# Patient Record
Sex: MALE | Race: WHITE | NOT HISPANIC OR LATINO | Employment: FULL TIME | URBAN - METROPOLITAN AREA
[De-identification: names, ages, dates, MRNs, and addresses within clinical notes are randomized per-mention and may not be internally consistent; named-entity substitution may affect disease eponyms.]

---

## 2017-08-31 ENCOUNTER — PATIENT OUTREACH (OUTPATIENT)
Dept: ADMINISTRATIVE | Facility: HOSPITAL | Age: 62
End: 2017-08-31

## 2017-08-31 NOTE — PROGRESS NOTES
Call pt concerning a physical with Dr. Ulisses Bingham.  Pt stated he do not care to see Dr. Bingham anymore and request to be removed as his PCP. Care team updated.

## 2018-08-28 DIAGNOSIS — M25.569 KNEE PAIN, UNSPECIFIED CHRONICITY, UNSPECIFIED LATERALITY: Primary | ICD-10-CM

## 2018-08-29 ENCOUNTER — OFFICE VISIT (OUTPATIENT)
Dept: SPINE | Facility: CLINIC | Age: 63
End: 2018-08-29
Payer: COMMERCIAL

## 2018-08-29 ENCOUNTER — OFFICE VISIT (OUTPATIENT)
Dept: ORTHOPEDICS | Facility: CLINIC | Age: 63
End: 2018-08-29
Payer: COMMERCIAL

## 2018-08-29 ENCOUNTER — HOSPITAL ENCOUNTER (OUTPATIENT)
Dept: RADIOLOGY | Facility: HOSPITAL | Age: 63
Discharge: HOME | End: 2018-08-29
Attending: PHYSICIAN ASSISTANT
Payer: COMMERCIAL

## 2018-08-29 ENCOUNTER — HOSPITAL ENCOUNTER (OUTPATIENT)
Dept: RADIOLOGY | Facility: HOSPITAL | Age: 63
Discharge: HOME | End: 2018-08-29
Attending: ORTHOPAEDIC SURGERY
Payer: COMMERCIAL

## 2018-08-29 VITALS
SYSTOLIC BLOOD PRESSURE: 135 MMHG | BODY MASS INDEX: 25.04 KG/M2 | HEART RATE: 66 BPM | DIASTOLIC BLOOD PRESSURE: 92 MMHG | WEIGHT: 169.06 LBS | HEIGHT: 69 IN

## 2018-08-29 VITALS — HEIGHT: 69 IN | BODY MASS INDEX: 25.03 KG/M2 | WEIGHT: 169 LBS

## 2018-08-29 DIAGNOSIS — M54.2 CERVICALGIA: Primary | ICD-10-CM

## 2018-08-29 DIAGNOSIS — M17.0 PRIMARY OSTEOARTHRITIS OF BOTH KNEES: Primary | ICD-10-CM

## 2018-08-29 DIAGNOSIS — Z98.890 HISTORY OF CERVICAL SPINAL SURGERY: ICD-10-CM

## 2018-08-29 DIAGNOSIS — M25.562 CHRONIC PAIN OF BOTH KNEES: ICD-10-CM

## 2018-08-29 DIAGNOSIS — G89.29 CHRONIC PAIN OF BOTH KNEES: ICD-10-CM

## 2018-08-29 DIAGNOSIS — M25.561 CHRONIC PAIN OF BOTH KNEES: ICD-10-CM

## 2018-08-29 DIAGNOSIS — M25.569 KNEE PAIN, UNSPECIFIED CHRONICITY, UNSPECIFIED LATERALITY: ICD-10-CM

## 2018-08-29 DIAGNOSIS — M54.2 CERVICALGIA: ICD-10-CM

## 2018-08-29 PROCEDURE — 99999 PR PBB SHADOW E&M-EST. PATIENT-LVL II: CPT | Mod: PBBFAC,,, | Performed by: ORTHOPAEDIC SURGERY

## 2018-08-29 PROCEDURE — 99999 PR PBB SHADOW E&M-EST. PATIENT-LVL III: CPT | Mod: PBBFAC,,, | Performed by: PHYSICIAN ASSISTANT

## 2018-08-29 PROCEDURE — 99204 OFFICE O/P NEW MOD 45 MIN: CPT | Mod: 25,S$GLB,, | Performed by: ORTHOPAEDIC SURGERY

## 2018-08-29 PROCEDURE — 72052 X-RAY EXAM NECK SPINE 6/>VWS: CPT | Mod: TC,PO

## 2018-08-29 PROCEDURE — 73562 X-RAY EXAM OF KNEE 3: CPT | Mod: 26,LT,, | Performed by: RADIOLOGY

## 2018-08-29 PROCEDURE — 73562 X-RAY EXAM OF KNEE 3: CPT | Mod: 26,RT,, | Performed by: RADIOLOGY

## 2018-08-29 PROCEDURE — 20610 DRAIN/INJ JOINT/BURSA W/O US: CPT | Mod: 50,S$GLB,, | Performed by: ORTHOPAEDIC SURGERY

## 2018-08-29 PROCEDURE — 72052 X-RAY EXAM NECK SPINE 6/>VWS: CPT | Mod: 26,,, | Performed by: RADIOLOGY

## 2018-08-29 PROCEDURE — 73562 X-RAY EXAM OF KNEE 3: CPT | Mod: TC,50,PO

## 2018-08-29 PROCEDURE — 99203 OFFICE O/P NEW LOW 30 MIN: CPT | Mod: S$GLB,,, | Performed by: PHYSICIAN ASSISTANT

## 2018-08-29 RX ORDER — TRIAMCINOLONE ACETONIDE 40 MG/ML
80 INJECTION, SUSPENSION INTRA-ARTICULAR; INTRAMUSCULAR
Status: DISCONTINUED | OUTPATIENT
Start: 2018-08-29 | End: 2018-08-29 | Stop reason: HOSPADM

## 2018-08-29 RX ADMIN — TRIAMCINOLONE ACETONIDE 80 MG: 40 INJECTION, SUSPENSION INTRA-ARTICULAR; INTRAMUSCULAR at 11:08

## 2018-08-29 NOTE — PROGRESS NOTES
Neurosurgery History & Physical    Patient ID: Edy Bartlett III is a 63 y.o. male.    Chief Complaint   Patient presents with    Neck Pain     Has had neck pain for years. Had neck fusion x2. Pain is constant. Taking Ibuprofen 800mg helps pain . Movement of head makes pain worse.       Review of Systems   Constitutional: Negative for activity change, chills, fatigue and unexpected weight change.   HENT: Negative for hearing loss, tinnitus, trouble swallowing and voice change.    Eyes: Negative for visual disturbance.   Respiratory: Negative for apnea, chest tightness and shortness of breath.    Cardiovascular: Negative for chest pain and palpitations.   Gastrointestinal: Negative for abdominal pain, constipation, diarrhea, nausea and vomiting.   Genitourinary: Negative for difficulty urinating, dysuria and frequency.   Musculoskeletal: Positive for neck pain. Negative for back pain, gait problem and neck stiffness.   Skin: Negative for wound.   Neurological: Negative for dizziness, tremors, seizures, facial asymmetry, speech difficulty, weakness, light-headedness, numbness and headaches.   Psychiatric/Behavioral: Negative for confusion and decreased concentration.       Past Medical History:   Diagnosis Date    BPH (benign prostatic hypertrophy) with urinary obstruction     Complication of anesthesia     gets very sore neck post-op if his head is twisted or bent too far with intubation    DDD (degenerative disc disease), cervical     DDD (degenerative disc disease), lumbar     DJD (degenerative joint disease) of knee 12/10/2012    ED (erectile dysfunction)     Elevated PSA     Hyperlipidemia     Hypertension     Mass of skin 2012    left temple, possible lipoma    Neuropathy     feet    OA (osteoarthritis of spine)     Tattoo Dec 2012    left upper arm     Social History     Socioeconomic History    Marital status:      Spouse name: Not on file    Number of children: Not on file    Years of  "education: Not on file    Highest education level: Not on file   Social Needs    Financial resource strain: Not on file    Food insecurity - worry: Not on file    Food insecurity - inability: Not on file    Transportation needs - medical: Not on file    Transportation needs - non-medical: Not on file   Occupational History     Employer:  [employer]   Tobacco Use    Smoking status: Never Smoker    Smokeless tobacco: Never Used   Substance and Sexual Activity    Alcohol use: No    Drug use: No    Sexual activity: Not on file   Other Topics Concern    Not on file   Social History Narrative    Not on file     Family History   Problem Relation Age of Onset    Hyperlipidemia Mother     Heart disease Mother     Diabetes Son     Diabetes type II Maternal Aunt     Prostate cancer Brother      Review of patient's allergies indicates:  No Known Allergies    Current Outpatient Medications:     alfuzosin (UROXATRAL) 10 mg Tb24, Take 10 mg by mouth once daily., Disp: , Rfl: 5    finasteride (PROSCAR) 5 mg tablet, Take 5 mg by mouth once daily., Disp: , Rfl: 3    tadalafil (CIALIS) 5 MG tablet, Take 10 mg by mouth daily as needed for Erectile Dysfunction. , Disp: , Rfl:   No current facility-administered medications for this visit.     Vitals:    08/29/18 1139   BP: (!) 135/92   BP Location: Left arm   Patient Position: Sitting   BP Method: Medium (Automatic)   Pulse: 66   Weight: 76.7 kg (169 lb 1.5 oz)   Height: 5' 9" (1.753 m)       Physical Exam   Constitutional: He is oriented to person, place, and time. He appears well-developed and well-nourished.   HENT:   Head: Normocephalic and atraumatic.   Eyes: Pupils are equal, round, and reactive to light.   Neck: Normal range of motion. Neck supple.   Cardiovascular: Normal rate.   Pulmonary/Chest: Effort normal.   Abdominal: He exhibits no distension.   Musculoskeletal: Normal range of motion. He exhibits no edema.   Neurological: He is alert and oriented to " person, place, and time. He has a normal Finger-Nose-Finger Test, a normal Heel to Shin Test, a normal Romberg Test and a normal Tandem Gait Test. Gait normal.   Reflex Scores:       Tricep reflexes are 2+ on the right side and 2+ on the left side.       Bicep reflexes are 2+ on the right side and 2+ on the left side.       Brachioradialis reflexes are 2+ on the right side and 2+ on the left side.       Patellar reflexes are 2+ on the right side and 2+ on the left side.       Achilles reflexes are 2+ on the right side and 2+ on the left side.  Skin: Skin is warm and dry.   Psychiatric: He has a normal mood and affect. His speech is normal and behavior is normal. Judgment and thought content normal.   Nursing note and vitals reviewed.      Neurologic Exam     Mental Status   Oriented to person, place, and time.   Oriented to person.   Oriented to place.   Oriented to time.   Follows 3 step commands.   Attention: normal. Concentration: normal.   Speech: speech is normal   Level of consciousness: alert  Knowledge: consistent with education.   Able to name object. Able to read. Able to repeat. Able to write. Normal comprehension.     Cranial Nerves     CN II   Visual acuity: normal  Right visual field deficit: none  Left visual field deficit: none     CN III, IV, VI   Pupils are equal, round, and reactive to light.  Right pupil: Size: 3 mm. Shape: regular. Reactivity: brisk. Consensual response: intact.   Left pupil: Size: 3 mm. Shape: regular. Reactivity: brisk. Consensual response: intact.   CN III: no CN III palsy  CN VI: no CN VI palsy  Nystagmus: none   Diplopia: none  Ophthalmoparesis: none  Conjugate gaze: present    CN V   Right facial sensation deficit: none  Left facial sensation deficit: none    CN VII   Right facial weakness: none  Left facial weakness: none    CN VIII   Hearing: intact    CN IX, X   CN IX normal.   CN X normal.     CN XI   Right sternocleidomastoid strength: normal  Left sternocleidomastoid  strength: normal  Right trapezius strength: normal  Left trapezius strength: normal    CN XII   Fasciculations: absent  Tongue deviation: none    Motor Exam   Muscle bulk: normal  Overall muscle tone: normal  Right arm pronator drift: absent  Left arm pronator drift: absent    Strength   Right neck flexion: 5/5  Left neck flexion: 5/5  Right neck extension: 5/5  Left neck extension: 5/5  Right deltoid: 5/5  Left deltoid: 5/5  Right biceps: 5/5  Left biceps: 5/5  Right triceps: 5/5  Left triceps: 5/5  Right wrist flexion: 5/5  Left wrist flexion: 5/5  Right wrist extension: 5/5  Left wrist extension: 5/5  Right interossei: 5/5  Left interossei: 5/5  Right abdominals: 5/5  Left abdominals: 5/5  Right iliopsoas: 5/5  Left iliopsoas: 5/5  Right quadriceps: 5/5  Left quadriceps: 5/5  Right hamstrin/5  Left hamstrin/5  Right glutei: 5/5  Left glutei: 5/5  Right anterior tibial: 5/5  Left anterior tibial: 5/5  Right posterior tibial: 5/5  Left posterior tibial: 5/5  Right peroneal: 5/5  Left peroneal: 5/5  Right gastroc: 5/5  Left gastroc: 5/5    Sensory Exam   Right arm light touch: normal  Left arm light touch: normal  Right leg light touch: normal  Left leg light touch: normal  Right arm vibration: normal  Left arm vibration: normal  Right arm pinprick: normal  Left arm pinprick: normal    Gait, Coordination, and Reflexes     Gait  Gait: normal    Coordination   Romberg: negative  Finger to nose coordination: normal  Heel to shin coordination: normal  Tandem walking coordination: normal    Tremor   Resting tremor: absent  Intention tremor: absent  Action tremor: absent    Reflexes   Right brachioradialis: 2+  Left brachioradialis: 2+  Right biceps: 2+  Left biceps: 2+  Right triceps: 2+  Left triceps: 2+  Right patellar: 2+  Left patellar: 2+  Right achilles: 2+  Left achilles: 2+  Right Burrell: absent  Left Burrell: absent  Right ankle clonus: absent  Left ankle clonus: absent      Provider dictation:  63 year  old male with history of 2 cervical spine surgeries (C3 to C7 fusion) approximately 15-18 years ago is self referred (through Dr. Silva) for evaluation of neck pain.  He has a history of throat cancer with radiation treatments and no concerning areas in the spine seen on 2015 PET scan.  He has  Had neck pain since surgeries without improvement.  Pain is felt in the posterior neck.  He denies radicular arm pain, numbness, tingling.  Pain is constant and varies in degrees of severity.  He takes ibuprofen for pain and has not had any other treatments.  NDI score: 22%.  PHQ:  4.    He is neurologically intact on exam.    Mr. Bartlett has had C3-C7 fusion and chronic neck pain for greater than 15 years since surgical intervention.  Pain is most likely myofascial and can be affected by postural changes due to cervical spine fusion.  I recommend xrays of the cervical spine today to assess for any mal-alignment/ hardware failure.  I will call with results.  I am also referring him to PT to help with pain.  If no improvement with PT or if indicated by xrays we will obtain further imaging.      Visit Diagnosis:  Cervicalgia  -     X-Ray Cervical Spine 5 View W Flex Extxt; Future; Expected date: 08/29/2018  -     Ambulatory Referral to Physical/Occupational Therapy    History of cervical spinal surgery  -     X-Ray Cervical Spine 5 View W Flex Extxt; Future; Expected date: 08/29/2018  -     Ambulatory Referral to Physical/Occupational Therapy        Total time spent counseling greater than fifty percent of total visit time.  Counseling included discussion regarding imaging findings, diagnosis possibilities, treatment options, risks and benefits.   The patient had many questions regarding the options and long-term effects.

## 2018-08-29 NOTE — PROCEDURES
Large Joint Aspiration/Injection: R knee, L knee  Date/Time: 8/29/2018 11:03 AM  Performed by: Nelson Silva MD  Authorized by: Nelson Silva MD     Consent Done?:  Yes (Verbal)  Indications:  Pain  Procedure site marked: Yes      Location:  Knee  Site:  R knee and L knee  Ultrasonic Guidance for needle placement: No  Needle size:  22 G  Approach:  Anterolateral  Medications:  80 mg triamcinolone acetonide 40 mg/mL  Patient tolerance:  Patient tolerated the procedure well with no immediate complications

## 2018-08-29 NOTE — PROGRESS NOTES
HISTORY OF PRESENT ILLNESS:  63 years old, complaining of bilateral knee pain,   which he has had now for many years.  Aching type pain.  1/10 on good days, 7/10   on bad days.  Bending his knees makes it worse.  He has had injections and   medications in the past, which have helped.  Has injection in his knees was over   four years ago.    Exam today shows tenderness at the joint line without signs of infection or   instability.  Skin is intact.  Compartments are soft.    X-rays show arthritic changes.    ASSESSMENT:  Bilateral knee arthrosis.    PLAN:  Bilateral Kenalog injections, strengthening over time.  Follow up as   needed.      PBB/HN  dd: 08/29/2018 11:39:19 (CDT)  td: 08/30/2018 06:53:40 (CDT)  Doc ID   #[id number]  Job ID #[id number]    CC:     Further History  Aching pain  Worse with activity  Relieved with rest  No other associated symptoms  No other radiation    Further Exam  Alert and oriented  Pleasant  Contralateral limb has appropriate range of motion for age and condition  Contralateral limb has appropriate strength for age and condition  Contralateral limb has appropriate stability  for age and condition  No adenopathy  Pulses are appropriate for current condition  Skin is intact        Chief Complaint    Chief Complaint   Patient presents with    Right Knee - Pain    Left Knee - Pain       HPI  Edy Bartlett III is a 63 y.o.  male who presents with       Past Medical History  Past Medical History:   Diagnosis Date    BPH (benign prostatic hypertrophy) with urinary obstruction     Complication of anesthesia     gets very sore neck post-op if his head is twisted or bent too far with intubation    DDD (degenerative disc disease), cervical     DDD (degenerative disc disease), lumbar     DJD (degenerative joint disease) of knee 12/10/2012    ED (erectile dysfunction)     Elevated PSA     Hyperlipidemia     Hypertension     Mass of skin 2012    left temple, possible lipoma    Neuropathy      feet    OA (osteoarthritis of spine)     Tattoo Dec 2012    left upper arm       Past Surgical History  Past Surgical History:   Procedure Laterality Date    APPENDECTOMY      CARPAL TUNNEL RELEASE      both hands/ lt hand x 2/ rt 1    CERVICAL SPINE SURGERY      x 2;fusion C3-7, can only bend his neck a little    EPIDURAL BLOCK INJECTION      injection for pain    FRACTURE SURGERY      ankle    hematoma removed  2006    thigh    HERNIA REPAIR      R inguinal    KNEE ARTHROSCOPY      PROSTATE BIOPSY      TENDON REPAIR      TRIGGER FINGER RELEASE  2007    left    TUMOR EXCISION      fatty tumor to left front of head, has since grown back       Medications  Current Outpatient Medications   Medication Sig    alfuzosin (UROXATRAL) 10 mg Tb24 Take 10 mg by mouth once daily.    amlodipine (NORVASC) 5 MG tablet Take 1 tablet (5 mg total) by mouth once daily.    ciprofloxacin HCl (CILOXAN) 0.3 % ophthalmic solution 4-5 gtts left EAR twice daily X 10 days    DUKE'S SOLUTION Take 10 mLs by mouth 4 (four) times daily.    finasteride (PROSCAR) 5 mg tablet Take 5 mg by mouth once daily.    HYDROmorphone (DILAUDID) 4 MG tablet     LIDOCAINE VISCOUS 2 % solution     oxycodone-acetaminophen (PERCOCET) 5-325 mg per tablet Take 1 tablet by mouth every 4 (four) hours as needed.    tadalafil (CIALIS) 5 MG tablet Take 10 mg by mouth daily as needed for Erectile Dysfunction.     tamsulosin (FLOMAX) 0.4 mg Cp24 Take 0.4 mg by mouth once daily.    omeprazole (PRILOSEC) 40 MG capsule Take 1 capsule (40 mg total) by mouth every morning. Take on empty stomach 30-60 minutes before meal     No current facility-administered medications for this visit.        Allergies  Review of patient's allergies indicates:  No Known Allergies    Family History  Family History   Problem Relation Age of Onset    Hyperlipidemia Mother     Heart disease Mother     Diabetes Son     Diabetes type II Maternal Aunt     Prostate cancer  Brother        Social History  Social History     Socioeconomic History    Marital status:      Spouse name: Not on file    Number of children: Not on file    Years of education: Not on file    Highest education level: Not on file   Social Needs    Financial resource strain: Not on file    Food insecurity - worry: Not on file    Food insecurity - inability: Not on file    Transportation needs - medical: Not on file    Transportation needs - non-medical: Not on file   Occupational History     Employer:  [employer]   Tobacco Use    Smoking status: Never Smoker    Smokeless tobacco: Never Used   Substance and Sexual Activity    Alcohol use: No    Drug use: No    Sexual activity: Not on file   Other Topics Concern    Not on file   Social History Narrative    Not on file               Review of Systems     Constitutional: Negative    HENT: Negative  Eyes: Negative  Respiratory: Negative  Cardiovascular: Negative  Musculoskeletal: HPI  Skin: Negative  Neurological: Negative  Hematological: Negative  Endocrine: Negative                 Physical Exam    There were no vitals filed for this visit.  Body mass index is 24.96 kg/m².  Physical Examination:     General appearance -  well appearing, and in no distress  Mental status - awake  Neck - supple  Chest -  symmetric air entry  Heart - normal rate   Abdomen - soft      Assessment     1. Primary osteoarthritis of both knees    2. Chronic pain of both knees          Plan

## 2018-08-30 ENCOUNTER — OFFICE VISIT (OUTPATIENT)
Dept: SURGERY | Facility: CLINIC | Age: 63
End: 2018-08-30
Payer: COMMERCIAL

## 2018-08-30 ENCOUNTER — TELEPHONE (OUTPATIENT)
Dept: SURGERY | Facility: CLINIC | Age: 63
End: 2018-08-30

## 2018-08-30 VITALS
BODY MASS INDEX: 27.35 KG/M2 | WEIGHT: 184.69 LBS | TEMPERATURE: 98 F | HEIGHT: 69 IN | DIASTOLIC BLOOD PRESSURE: 77 MMHG | HEART RATE: 79 BPM | SYSTOLIC BLOOD PRESSURE: 137 MMHG

## 2018-08-30 DIAGNOSIS — R22.9 SUBCUTANEOUS MASS: Primary | ICD-10-CM

## 2018-08-30 PROCEDURE — 99203 OFFICE O/P NEW LOW 30 MIN: CPT | Mod: S$GLB,,, | Performed by: SURGERY

## 2018-08-30 PROCEDURE — 99999 PR PBB SHADOW E&M-EST. PATIENT-LVL III: CPT | Mod: PBBFAC,,, | Performed by: SURGERY

## 2018-08-30 RX ORDER — IBUPROFEN 200 MG
1 TABLET ORAL
Status: ON HOLD | COMMUNITY
End: 2019-09-10 | Stop reason: HOSPADM

## 2018-08-30 NOTE — TELEPHONE ENCOUNTER
----- Message from Shakeel Clay sent at 8/30/2018 12:45 PM CDT -----  Contact: Pt  .Type:  Patient Returning Call    Who Called:  Marciano  Who Left Message for Patient:  Edy  Does the patient know what this is regarding?: results  Best Call Back Number:  [phone] (home)   Additional Information:

## 2018-08-30 NOTE — PROGRESS NOTES
Subjective:       Patient ID: Edy Bartlett III is a 63 y.o. male.    Chief Complaint: Cyst (Fatty tumor on left side of head)    HPI   Pleasant 64 yo M referred to me for evaluation of lesion on his scalp. P tnotes that he has a mass that has gotten larger.  Notes some discomfort from the lesion but is concerned by increasing size.  He has had no fever/chills. No drainage.  No skin changes.      Review of Systems   Constitutional: Negative for activity change, appetite change, diaphoresis, fever and unexpected weight change.   Respiratory: Negative for cough, chest tightness and wheezing.    Cardiovascular: Negative for chest pain and palpitations.   Gastrointestinal: Negative for abdominal distention, abdominal pain, anal bleeding, blood in stool, constipation, diarrhea, nausea, rectal pain and vomiting.   Genitourinary: Negative for difficulty urinating, dysuria and hematuria.   Musculoskeletal: Negative for back pain and gait problem.   Skin: Negative for color change and wound.   Neurological: Negative for tremors and seizures.       Objective:      Physical Exam   Constitutional: He is oriented to person, place, and time. He appears well-developed and well-nourished.   HENT:   Head: Normocephalic and atraumatic.   Eyes: Pupils are equal, round, and reactive to light.   Neck: Normal range of motion. No tracheal deviation present. No thyromegaly present.   Cardiovascular: Normal rate, regular rhythm and normal heart sounds. Exam reveals no gallop and no friction rub.   No murmur heard.  Pulmonary/Chest: Effort normal and breath sounds normal. He has no wheezes. He exhibits no tenderness.   Abdominal: He exhibits no distension and no mass. There is no tenderness. There is no rebound and no guarding.   Musculoskeletal: Normal range of motion.   Neurological: He is alert and oriented to person, place, and time. Coordination normal.   Skin: Skin is warm.        Psychiatric: He has a normal mood and affect.   Vitals  reviewed.      Assessment:     Subcutaneous mass  No diagnosis found.    Plan:   D/w pt. I have offered excision of lesion.  He wants it done in clinic setting and will RTC tomorrow for removal

## 2018-08-30 NOTE — TELEPHONE ENCOUNTER
----- Message from Zeb Townsend sent at 8/30/2018  1:05 PM CDT -----  Contact: same  Unsuccessful call placed to office.  Patient called in and stated he just missed a call from office, not sure why?  Patient call back number is [phone]

## 2018-08-31 ENCOUNTER — OFFICE VISIT (OUTPATIENT)
Dept: SURGERY | Facility: CLINIC | Age: 63
End: 2018-08-31
Payer: COMMERCIAL

## 2018-08-31 VITALS
WEIGHT: 184.75 LBS | HEART RATE: 64 BPM | DIASTOLIC BLOOD PRESSURE: 86 MMHG | TEMPERATURE: 98 F | BODY MASS INDEX: 27.36 KG/M2 | HEIGHT: 69 IN | SYSTOLIC BLOOD PRESSURE: 136 MMHG

## 2018-08-31 DIAGNOSIS — R22.9 SUBCUTANEOUS MASS: Primary | ICD-10-CM

## 2018-08-31 PROCEDURE — 99999 PR PBB SHADOW E&M-EST. PATIENT-LVL III: CPT | Mod: PBBFAC,,, | Performed by: SURGERY

## 2018-08-31 PROCEDURE — 88304 TISSUE EXAM BY PATHOLOGIST: CPT | Mod: 26,,, | Performed by: PATHOLOGY

## 2018-08-31 PROCEDURE — 21012 EXC FACE LES SBQ 2 CM/>: CPT | Mod: S$GLB,,, | Performed by: SURGERY

## 2018-08-31 PROCEDURE — 88304 TISSUE EXAM BY PATHOLOGIST: CPT | Performed by: PATHOLOGY

## 2018-08-31 NOTE — PROGRESS NOTES
62 yo M whom I am familiar with.  He returns for removal of subcutaneous mass.  Pt is without complaint.  No pain or discomfort      AFVSS  AAOx3  Sinus  Soft/nt/nd  3 cm subcutaneous mass on L frontal scalp    A/P: Subcutaneous mass    Following signing of informed consent pt was placed on the procedure table.  L frontal scalp  was prepped and draped.  I then infiltrated the surrounding area with 10 cc's of 2 %lidocaine with epi without event.  Next an incision was made and lesion was sharply dissected and removed without event.  Lesion measured approximately 3 cm in diameter.  Wound was irrigated, hemostasis achieved.  I then closed the wound in two layers with 3-0 vicryl and 4-0 monocryl.    Pt tolerated the procedure well and will f/u with me in 2 weeks

## 2018-08-31 NOTE — PATIENT INSTRUCTIONS
Specimen sent to pathology labeled in front of pt, two identifiers, confirmed by Hammad Pak MA all labeling correct.

## 2019-05-21 ENCOUNTER — TELEPHONE (OUTPATIENT)
Dept: SPINE | Facility: CLINIC | Age: 64
End: 2019-05-21

## 2019-05-21 NOTE — TELEPHONE ENCOUNTER
----- Message from Laisha Rodriguez sent at 5/21/2019  9:33 AM CDT -----  Contact: self  Type:  Same Day Appointment Request    Caller is requesting a same day appointment.  Caller declined first available appointment listed below.      Name of Caller:  self  When is the first available appointment?  06/21/19  Symptoms:  Severe back pain  Best Call Back Number:  [phone]  Additional Information:  Patient would like to be seen today. Please call patient. Thanks!

## 2019-05-21 NOTE — TELEPHONE ENCOUNTER
Spoke with patient and he is in severe pain and asked for an appointment to see Shira Goff today. I let him know Shira does not have anything available until 6-21-19 and he declined the appointment because he needs something today.

## 2019-05-22 ENCOUNTER — OFFICE VISIT (OUTPATIENT)
Dept: FAMILY MEDICINE | Facility: CLINIC | Age: 64
End: 2019-05-22
Payer: COMMERCIAL

## 2019-05-22 VITALS
SYSTOLIC BLOOD PRESSURE: 115 MMHG | WEIGHT: 183.38 LBS | HEART RATE: 87 BPM | TEMPERATURE: 99 F | HEIGHT: 69 IN | BODY MASS INDEX: 27.16 KG/M2 | DIASTOLIC BLOOD PRESSURE: 70 MMHG

## 2019-05-22 DIAGNOSIS — M54.5 ACUTE MIDLINE LOW BACK PAIN, WITH SCIATICA PRESENCE UNSPECIFIED: Primary | ICD-10-CM

## 2019-05-22 PROCEDURE — 99999 PR PBB SHADOW E&M-EST. PATIENT-LVL III: ICD-10-PCS | Mod: PBBFAC,,, | Performed by: NURSE PRACTITIONER

## 2019-05-22 PROCEDURE — 99203 OFFICE O/P NEW LOW 30 MIN: CPT | Mod: 25,S$GLB,, | Performed by: NURSE PRACTITIONER

## 2019-05-22 PROCEDURE — 96372 THER/PROPH/DIAG INJ SC/IM: CPT | Mod: S$GLB,,, | Performed by: NURSE PRACTITIONER

## 2019-05-22 PROCEDURE — 99999 PR PBB SHADOW E&M-EST. PATIENT-LVL III: CPT | Mod: PBBFAC,,, | Performed by: NURSE PRACTITIONER

## 2019-05-22 PROCEDURE — 99203 PR OFFICE/OUTPT VISIT, NEW, LEVL III, 30-44 MIN: ICD-10-PCS | Mod: 25,S$GLB,, | Performed by: NURSE PRACTITIONER

## 2019-05-22 PROCEDURE — 96372 PR INJECTION,THERAP/PROPH/DIAG2ST, IM OR SUBCUT: ICD-10-PCS | Mod: S$GLB,,, | Performed by: NURSE PRACTITIONER

## 2019-05-22 RX ORDER — KETOROLAC TROMETHAMINE 30 MG/ML
60 INJECTION, SOLUTION INTRAMUSCULAR; INTRAVENOUS ONCE
Status: COMPLETED | OUTPATIENT
Start: 2019-05-22 | End: 2019-05-22

## 2019-05-22 RX ORDER — CYCLOBENZAPRINE HCL 10 MG
10 TABLET ORAL 3 TIMES DAILY PRN
Qty: 30 TABLET | Refills: 0 | Status: SHIPPED | OUTPATIENT
Start: 2019-05-22 | End: 2019-06-01

## 2019-05-22 RX ORDER — DEXAMETHASONE SODIUM PHOSPHATE 4 MG/ML
8 INJECTION, SOLUTION INTRA-ARTICULAR; INTRALESIONAL; INTRAMUSCULAR; INTRAVENOUS; SOFT TISSUE ONCE
Status: COMPLETED | OUTPATIENT
Start: 2019-05-22 | End: 2019-05-22

## 2019-05-22 RX ADMIN — DEXAMETHASONE SODIUM PHOSPHATE 8 MG: 4 INJECTION, SOLUTION INTRA-ARTICULAR; INTRALESIONAL; INTRAMUSCULAR; INTRAVENOUS; SOFT TISSUE at 02:05

## 2019-05-22 RX ADMIN — KETOROLAC TROMETHAMINE 60 MG: 30 INJECTION, SOLUTION INTRAMUSCULAR; INTRAVENOUS at 02:05

## 2019-05-22 NOTE — LETTER
May 22, 2019      [facility]  [address] [address]  Phone: [phone]  Fax: [phone]       Patient: Edy Bartlett   YOB: 1955  Date of Visit: 05/22/2019    To Whom It May Concern:    Roxanna Bartlett  was at [facility] on 05/22/2019. He may return to work/school on 5/23/19 or 5/24/19 with no restrictions. If you have any questions or concerns, or if I can be of further assistance, please do not hesitate to contact me.    Sincerely,    Robin Retana NP

## 2019-05-22 NOTE — PROGRESS NOTES
Subjective:       Patient ID: Edy Bartlett III is a 63 y.o. male.    Chief Complaint: Back Pain    He comes in as a new patient to establish.      Back Pain   This is a new problem. The current episode started in the past 7 days. The problem occurs constantly. The problem is unchanged. The pain is present in the lumbar spine. The quality of the pain is described as aching and shooting. The pain does not radiate. The symptoms are aggravated by position and bending. Pertinent negatives include no abdominal pain, chest pain, dysuria, fever or headaches. The treatment provided no relief.       Review of Systems   Constitutional: Negative for fatigue, fever and unexpected weight change.   HENT: Negative for ear pain and sore throat.    Eyes: Negative.  Negative for pain and visual disturbance.   Respiratory: Negative for cough and shortness of breath.    Cardiovascular: Negative for chest pain and palpitations.   Gastrointestinal: Negative for abdominal pain, diarrhea, nausea and vomiting.   Genitourinary: Negative for dysuria and frequency.   Musculoskeletal: Positive for back pain. Negative for arthralgias and myalgias.   Skin: Negative for color change and rash.   Neurological: Negative for dizziness and headaches.   Psychiatric/Behavioral: Negative for sleep disturbance. The patient is not nervous/anxious.        Vitals:    05/22/19 1343   BP: 115/70   Pulse: 87   Temp: 98.6 °F (37 °C)       Objective:     Current Outpatient Medications   Medication Sig Dispense Refill    ibuprofen (ADVIL,MOTRIN) 200 MG tablet Take 1 tablet by mouth as needed.      cyclobenzaprine (FLEXERIL) 10 MG tablet Take 1 tablet (10 mg total) by mouth 3 (three) times daily as needed for Muscle spasms. 30 tablet 0     Current Facility-Administered Medications   Medication Dose Route Frequency Provider Last Rate Last Dose    dexamethasone injection 8 mg  8 mg Intramuscular Once Robin Retana NP        ketorolac injection 60 mg  60 mg  Intramuscular Once Robin Retana NP           Physical Exam   Constitutional: He is oriented to person, place, and time. He appears well-developed. No distress.   HENT:   Head: Normocephalic and atraumatic.   Eyes: Pupils are equal, round, and reactive to light. EOM are normal.   Neck: Normal range of motion. Neck supple.   Cardiovascular: Normal rate and regular rhythm.   Pulmonary/Chest: Effort normal and breath sounds normal.   Musculoskeletal:        Lumbar back: He exhibits decreased range of motion, tenderness, pain and spasm.   Neurological: He is alert and oriented to person, place, and time.   Skin: Skin is warm and dry. No rash noted.   Psychiatric: He has a normal mood and affect. Thought content normal.   Nursing note and vitals reviewed.      Assessment:       1. Acute midline low back pain, with sciatica presence unspecified        Plan:   Acute midline low back pain, with sciatica presence unspecified  -     ketorolac injection 60 mg  -     dexamethasone injection 8 mg    Other orders  -     cyclobenzaprine (FLEXERIL) 10 MG tablet; Take 1 tablet (10 mg total) by mouth 3 (three) times daily as needed for Muscle spasms.  Dispense: 30 tablet; Refill: 0        Follow up if symptoms worsen or fail to improve.    There are no Patient Instructions on file for this visit.

## 2019-08-23 ENCOUNTER — OFFICE VISIT (OUTPATIENT)
Dept: ORTHOPEDICS | Facility: CLINIC | Age: 64
End: 2019-08-23
Payer: COMMERCIAL

## 2019-08-23 ENCOUNTER — HOSPITAL ENCOUNTER (OUTPATIENT)
Dept: RADIOLOGY | Facility: HOSPITAL | Age: 64
Discharge: HOME | End: 2019-08-23
Attending: NURSE PRACTITIONER
Payer: COMMERCIAL

## 2019-08-23 ENCOUNTER — OFFICE VISIT (OUTPATIENT)
Dept: FAMILY MEDICINE | Facility: CLINIC | Age: 64
End: 2019-08-23
Payer: COMMERCIAL

## 2019-08-23 VITALS
WEIGHT: 183.44 LBS | HEIGHT: 69 IN | HEART RATE: 82 BPM | BODY MASS INDEX: 27.17 KG/M2 | OXYGEN SATURATION: 96 % | SYSTOLIC BLOOD PRESSURE: 140 MMHG | TEMPERATURE: 98 F | DIASTOLIC BLOOD PRESSURE: 90 MMHG

## 2019-08-23 VITALS — HEIGHT: 69 IN | WEIGHT: 183 LBS | BODY MASS INDEX: 27.11 KG/M2

## 2019-08-23 DIAGNOSIS — M25.561 CHRONIC PAIN OF BOTH KNEES: ICD-10-CM

## 2019-08-23 DIAGNOSIS — Z13.220 NEED FOR LIPID SCREENING: ICD-10-CM

## 2019-08-23 DIAGNOSIS — Z77.090 H/O ASBESTOS EXPOSURE: ICD-10-CM

## 2019-08-23 DIAGNOSIS — M17.0 PRIMARY OSTEOARTHRITIS OF BOTH KNEES: Primary | ICD-10-CM

## 2019-08-23 DIAGNOSIS — E78.5 HYPERLIPIDEMIA, UNSPECIFIED HYPERLIPIDEMIA TYPE: ICD-10-CM

## 2019-08-23 DIAGNOSIS — I10 ESSENTIAL HYPERTENSION: Primary | ICD-10-CM

## 2019-08-23 DIAGNOSIS — G89.29 CHRONIC PAIN OF BOTH KNEES: ICD-10-CM

## 2019-08-23 DIAGNOSIS — M25.562 CHRONIC PAIN OF BOTH KNEES: ICD-10-CM

## 2019-08-23 DIAGNOSIS — Z85.22 HISTORY OF CANCER OF NASAL CAVITY: ICD-10-CM

## 2019-08-23 PROCEDURE — 71046 XR CHEST PA AND LATERAL: ICD-10-PCS | Mod: 26,,, | Performed by: RADIOLOGY

## 2019-08-23 PROCEDURE — 71046 X-RAY EXAM CHEST 2 VIEWS: CPT | Mod: TC,FY,PO

## 2019-08-23 PROCEDURE — 99214 PR OFFICE/OUTPT VISIT, EST, LEVL IV, 30-39 MIN: ICD-10-PCS | Mod: 57,S$GLB,, | Performed by: ORTHOPAEDIC SURGERY

## 2019-08-23 PROCEDURE — 71046 X-RAY EXAM CHEST 2 VIEWS: CPT | Mod: 26,,, | Performed by: RADIOLOGY

## 2019-08-23 PROCEDURE — 99214 PR OFFICE/OUTPT VISIT, EST, LEVL IV, 30-39 MIN: ICD-10-PCS | Mod: S$GLB,,, | Performed by: NURSE PRACTITIONER

## 2019-08-23 PROCEDURE — 99999 PR PBB SHADOW E&M-EST. PATIENT-LVL II: ICD-10-PCS | Mod: PBBFAC,,, | Performed by: ORTHOPAEDIC SURGERY

## 2019-08-23 PROCEDURE — 99214 OFFICE O/P EST MOD 30 MIN: CPT | Mod: S$GLB,,, | Performed by: NURSE PRACTITIONER

## 2019-08-23 PROCEDURE — 99214 OFFICE O/P EST MOD 30 MIN: CPT | Mod: 57,S$GLB,, | Performed by: ORTHOPAEDIC SURGERY

## 2019-08-23 PROCEDURE — 99999 PR PBB SHADOW E&M-EST. PATIENT-LVL IV: CPT | Mod: PBBFAC,,, | Performed by: NURSE PRACTITIONER

## 2019-08-23 PROCEDURE — 99999 PR PBB SHADOW E&M-EST. PATIENT-LVL II: CPT | Mod: PBBFAC,,, | Performed by: ORTHOPAEDIC SURGERY

## 2019-08-23 PROCEDURE — 99999 PR PBB SHADOW E&M-EST. PATIENT-LVL IV: ICD-10-PCS | Mod: PBBFAC,,, | Performed by: NURSE PRACTITIONER

## 2019-08-23 RX ORDER — CYCLOBENZAPRINE HCL 10 MG
10 TABLET ORAL
Refills: 0 | COMMUNITY
Start: 2019-07-03 | End: 2019-10-29

## 2019-08-23 RX ORDER — TRAMADOL HYDROCHLORIDE 50 MG/1
50 TABLET ORAL EVERY 8 HOURS PRN
Refills: 0 | Status: ON HOLD | COMMUNITY
Start: 2019-07-16 | End: 2019-09-10 | Stop reason: HOSPADM

## 2019-08-23 RX ORDER — ROSUVASTATIN CALCIUM 10 MG/1
10 TABLET, COATED ORAL DAILY
Refills: 2 | COMMUNITY
Start: 2019-07-03 | End: 2019-09-04

## 2019-08-23 RX ORDER — LISINOPRIL 20 MG/1
20 TABLET ORAL DAILY
Refills: 2 | COMMUNITY
Start: 2019-07-03 | End: 2020-02-20

## 2019-08-23 NOTE — PROGRESS NOTES
Subjective:       Patient ID: Edy Bartlett III is a 64 y.o. male.    Chief Complaint: Pre-op Exam    Patient who is new to me presents to establish care. He needs a pre-op clearance for a partial knee replacement. No date for the surgery has been done. He has history nasal pharyngeal cancer. He has never had a colonscopy. 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 He is new to [facility]. 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     Review of Systems   Constitutional: Negative for chills, fatigue and fever.   HENT: Negative for congestion, ear pain, sinus pressure and sore throat.    Respiratory: Positive for cough and shortness of breath (chronic believes related to abestos ). Negative for wheezing.    Cardiovascular: Negative for chest pain and leg swelling.   Gastrointestinal: Negative for abdominal distention and abdominal pain.   Genitourinary: Negative for dysuria and flank pain.   Musculoskeletal: Positive for arthralgias (knee pain).   Skin: Negative for color change and pallor.   Neurological: Negative for light-headedness, numbness and headaches.       Objective:      Physical Exam   Constitutional: He is oriented to person, place, and time. He appears well-developed and well-nourished.   HENT:   Head: Normocephalic and atraumatic.   Right Ear: Hearing, tympanic membrane, external ear and ear canal normal.   Left Ear: Hearing, tympanic membrane, external ear and ear canal normal.   Eyes: Pupils are equal, round, and reactive to light. Conjunctivae and EOM are normal.   Neck: Normal range of motion. Neck supple.   Cardiovascular: Normal rate and regular rhythm.   Pulmonary/Chest: Effort normal and breath sounds normal.   Abdominal: Soft. Bowel sounds are normal.   Musculoskeletal:        Left knee: He exhibits decreased range of motion. Tenderness found.   Neurological: He is alert and oriented to person,  place, and time.   Skin: Skin is warm and dry.   Nursing note and vitals reviewed.      Assessment:       1. Essential hypertension    2. H/O asbestos exposure    3. Need for lipid screening    4. Hyperlipidemia, unspecified hyperlipidemia type    5. History of cancer of nasal cavity        Plan:       Edy was seen today for pre-op exam.    Diagnoses and all orders for this visit:    Essential hypertension  -     CBC auto differential; Future  -     Comprehensive metabolic panel; Future    H/O asbestos exposure  -     CBC auto differential; Future  -     Comprehensive metabolic panel; Future  -     X-Ray Chest PA And Lateral; Future    Need for lipid screening  -     Lipid panel; Future    Hyperlipidemia, unspecified hyperlipidemia type  -     Lipid panel; Future    History of cancer of nasal cavity    Ordered xray and labs to start process for surgery clearance. Patient to follow up to establish with physician for surgery clearance.

## 2019-08-23 NOTE — PROGRESS NOTES
HISTORY OF PRESENT ILLNESS:  A 64 years old, bilateral knee pain, left being   worse than the right.  Points to the medial side and he has occasional pain.    Reports he had knee arthroscopy in the past on that knee.  Told he may need a   partial knee replacement.  Pain is 5/10 on the pain scale.    Exam today shows varus deformity, which is passively correctable.  Tenderness to   the medial joint line.  Extensor mechanism is intact.    X-rays show medial joint space narrowing.    ASSESSMENT:  Medial compartment arthrosis.    PLAN:  We will set him up for partial knee replacement.  The patient is aware of   the risks and limitations and still wants to proceed.      PBB/IN  dd: 08/23/2019 09:05:08 (CDT)  td: 08/24/2019 01:25:12 (CDT)  Doc ID   #[id number]  Job ID #[id number]    CC:     Further History  Aching pain  Worse with activity  Relieved with rest  No other associated symptoms  No other radiation    Further Exam  Alert and oriented  Pleasant  Contralateral limb has appropriate range of motion for age and condition  Contralateral limb has appropriate strength for age and condition  Contralateral limb has appropriate stability  for age and condition  No adenopathy  Pulses are appropriate for current condition  Skin is intact        Chief Complaint    Chief Complaint   Patient presents with    Left Knee - Pain    Right Knee - Pain       HPI  Edy Bartlett III is a 64 y.o.  male who presents with       Past Medical History  Past Medical History:   Diagnosis Date    BPH (benign prostatic hypertrophy) with urinary obstruction     Cancer     Complication of anesthesia     gets very sore neck post-op if his head is twisted or bent too far with intubation    DDD (degenerative disc disease), cervical     DDD (degenerative disc disease), lumbar     DJD (degenerative joint disease) of knee 12/10/2012    ED (erectile dysfunction)     Elevated PSA     Hyperlipidemia     Hypertension     Mass of skin 2012    left  temple, possible lipoma    Neuropathy     feet    OA (osteoarthritis of spine)     Tattoo Dec 2012    left upper arm       Past Surgical History  Past Surgical History:   Procedure Laterality Date    APPENDECTOMY      ARTHROSCOPY, KNEE Left 12/18/2012    [facility]    BIOPSY-NASAL SINUS NASOPHARYNGEAL N/A 3/20/2015    [facility] at [facility]    CARPAL TUNNEL RELEASE      both hands/ lt hand x 2/ rt 1    CERVICAL SPINE SURGERY      x 2;fusion C3-7, can only bend his neck a little    EPIDURAL BLOCK INJECTION      injection for pain    FRACTURE SURGERY      ankle    hematoma removed  2006    thigh    HERNIA REPAIR      R inguinal    KNEE ARTHROSCOPY      PROSTATE BIOPSY      TENDON REPAIR      TRIGGER FINGER RELEASE  2007    left    TUMOR EXCISION      fatty tumor to left front of head, has since grown back       Medications  Current Outpatient Medications   Medication Sig    cyclobenzaprine (FLEXERIL) 10 MG tablet Take 10 mg by mouth as needed.    ibuprofen (ADVIL,MOTRIN) 200 MG tablet Take 1 tablet by mouth as needed.    lisinopril (PRINIVIL,ZESTRIL) 20 MG tablet Take 20 mg by mouth once daily.    rosuvastatin (CRESTOR) 10 MG tablet Take 10 mg by mouth once daily.    traMADol (ULTRAM) 50 mg tablet Take 50 mg by mouth every 8 (eight) hours as needed.     No current facility-administered medications for this visit.        Allergies  Review of patient's allergies indicates:  No Known Allergies    Family History  Family History   Problem Relation Age of Onset    Hyperlipidemia Mother     Heart disease Mother     Diabetes Son     Diabetes type II Maternal Aunt     Prostate cancer Brother        Social History  Social History     Socioeconomic History    Marital status:      Spouse name: Not on file    Number of children: Not on file    Years of education: Not on file    Highest education level: Not on file   Occupational History      Employer:  [employer]   Social Needs    Financial resource strain: Not on file    Food insecurity:     Worry: Not on file     Inability: Not on file    Transportation needs:     Medical: Not on file     Non-medical: Not on file   Tobacco Use    Smoking status: Never Smoker    Smokeless tobacco: Never Used   Substance and Sexual Activity    Alcohol use: No    Drug use: No    Sexual activity: Not on file   Lifestyle    Physical activity:     Days per week: Not on file     Minutes per session: Not on file    Stress: Not on file   Relationships    Social connections:     Talks on phone: Not on file     Gets together: Not on file     Attends Jainism service: Not on file     Active member of club or organization: Not on file     Attends meetings of clubs or organizations: Not on file     Relationship status: Not on file   Other Topics Concern    Not on file   Social History Narrative    Not on file               Review of Systems     Constitutional: Negative    HENT: Negative  Eyes: Negative  Respiratory: Negative  Cardiovascular: Negative  Musculoskeletal: HPI  Skin: Negative  Neurological: Negative  Hematological: Negative  Endocrine: Negative                 Physical Exam    There were no vitals filed for this visit.  Body mass index is 27.02 kg/m².  Physical Examination:     General appearance -  well appearing, and in no distress  Mental status - awake  Neck - supple  Chest -  symmetric air entry  Heart - normal rate   Abdomen - soft      Assessment     1. Primary osteoarthritis of both knees    2. Chronic pain of both knees          Plan

## 2019-08-23 NOTE — H&P (VIEW-ONLY)
HISTORY OF PRESENT ILLNESS:  A 64 years old, bilateral knee pain, left being   worse than the right.  Points to the medial side and he has occasional pain.    Reports he had knee arthroscopy in the past on that knee.  Told he may need a   partial knee replacement.  Pain is 5/10 on the pain scale.    Exam today shows varus deformity, which is passively correctable.  Tenderness to   the medial joint line.  Extensor mechanism is intact.    X-rays show medial joint space narrowing.    ASSESSMENT:  Medial compartment arthrosis.    PLAN:  We will set him up for partial knee replacement.  The patient is aware of   the risks and limitations and still wants to proceed.      PBB/IN  dd: 08/23/2019 09:05:08 (CDT)  td: 08/24/2019 01:25:12 (CDT)  Doc ID   #[id number]  Job ID #[id number]    CC:     Further History  Aching pain  Worse with activity  Relieved with rest  No other associated symptoms  No other radiation    Further Exam  Alert and oriented  Pleasant  Contralateral limb has appropriate range of motion for age and condition  Contralateral limb has appropriate strength for age and condition  Contralateral limb has appropriate stability  for age and condition  No adenopathy  Pulses are appropriate for current condition  Skin is intact        Chief Complaint    Chief Complaint   Patient presents with    Left Knee - Pain    Right Knee - Pain       HPI  Edy Bartlett III is a 64 y.o.  male who presents with       Past Medical History  Past Medical History:   Diagnosis Date    BPH (benign prostatic hypertrophy) with urinary obstruction     Cancer     Complication of anesthesia     gets very sore neck post-op if his head is twisted or bent too far with intubation    DDD (degenerative disc disease), cervical     DDD (degenerative disc disease), lumbar     DJD (degenerative joint disease) of knee 12/10/2012    ED (erectile dysfunction)     Elevated PSA     Hyperlipidemia     Hypertension     Mass of skin 2012    left  temple, possible lipoma    Neuropathy     feet    OA (osteoarthritis of spine)     Tattoo Dec 2012    left upper arm       Past Surgical History  Past Surgical History:   Procedure Laterality Date    APPENDECTOMY      ARTHROSCOPY, KNEE Left 12/18/2012    [facility]    BIOPSY-NASAL SINUS NASOPHARYNGEAL N/A 3/20/2015    [facility] by Rachel [facility] at [facility] OR    CARPAL TUNNEL RELEASE      both hands/ lt hand x 2/ rt 1    CERVICAL SPINE SURGERY      x 2;fusion C3-7, can only bend his neck a little    EPIDURAL BLOCK INJECTION      injection for pain    FRACTURE SURGERY      ankle    hematoma removed  2006    thigh    HERNIA REPAIR      R inguinal    KNEE ARTHROSCOPY      PROSTATE BIOPSY      TENDON REPAIR      TRIGGER FINGER RELEASE  2007    left    TUMOR EXCISION      fatty tumor to left front of head, has since grown back       Medications  Current Outpatient Medications   Medication Sig    cyclobenzaprine (FLEXERIL) 10 MG tablet Take 10 mg by mouth as needed.    ibuprofen (ADVIL,MOTRIN) 200 MG tablet Take 1 tablet by mouth as needed.    lisinopril (PRINIVIL,ZESTRIL) 20 MG tablet Take 20 mg by mouth once daily.    rosuvastatin (CRESTOR) 10 MG tablet Take 10 mg by mouth once daily.    traMADol (ULTRAM) 50 mg tablet Take 50 mg by mouth every 8 (eight) hours as needed.     No current facility-administered medications for this visit.        Allergies  Review of patient's allergies indicates:  No Known Allergies    Family History  Family History   Problem Relation Age of Onset    Hyperlipidemia Mother     Heart disease Mother     Diabetes Son     Diabetes type II Maternal Aunt     Prostate cancer Brother        Social History  Social History     Socioeconomic History    Marital status:      Spouse name: Not on file    Number of children: Not on file    Years of education: Not on file    Highest education level: Not on file   Occupational History      Employer:  [employer]   Social Needs    Financial resource strain: Not on file    Food insecurity:     Worry: Not on file     Inability: Not on file    Transportation needs:     Medical: Not on file     Non-medical: Not on file   Tobacco Use    Smoking status: Never Smoker    Smokeless tobacco: Never Used   Substance and Sexual Activity    Alcohol use: No    Drug use: No    Sexual activity: Not on file   Lifestyle    Physical activity:     Days per week: Not on file     Minutes per session: Not on file    Stress: Not on file   Relationships    Social connections:     Talks on phone: Not on file     Gets together: Not on file     Attends Anabaptist service: Not on file     Active member of club or organization: Not on file     Attends meetings of clubs or organizations: Not on file     Relationship status: Not on file   Other Topics Concern    Not on file   Social History Narrative    Not on file               Review of Systems     Constitutional: Negative    HENT: Negative  Eyes: Negative  Respiratory: Negative  Cardiovascular: Negative  Musculoskeletal: HPI  Skin: Negative  Neurological: Negative  Hematological: Negative  Endocrine: Negative                 Physical Exam    There were no vitals filed for this visit.  Body mass index is 27.02 kg/m².  Physical Examination:     General appearance -  well appearing, and in no distress  Mental status - awake  Neck - supple  Chest -  symmetric air entry  Heart - normal rate   Abdomen - soft      Assessment     1. Primary osteoarthritis of both knees    2. Chronic pain of both knees          Plan

## 2019-08-26 ENCOUNTER — OFFICE VISIT (OUTPATIENT)
Dept: FAMILY MEDICINE | Facility: CLINIC | Age: 64
End: 2019-08-26
Payer: COMMERCIAL

## 2019-08-26 VITALS
OXYGEN SATURATION: 97 % | HEART RATE: 93 BPM | DIASTOLIC BLOOD PRESSURE: 78 MMHG | BODY MASS INDEX: 27.41 KG/M2 | WEIGHT: 185.63 LBS | SYSTOLIC BLOOD PRESSURE: 126 MMHG

## 2019-08-26 DIAGNOSIS — N40.1 BENIGN PROSTATIC HYPERPLASIA WITH URINARY OBSTRUCTION: ICD-10-CM

## 2019-08-26 DIAGNOSIS — G89.29 CHRONIC PAIN OF LEFT KNEE: ICD-10-CM

## 2019-08-26 DIAGNOSIS — Z01.818 PREOP EXAMINATION: Primary | ICD-10-CM

## 2019-08-26 DIAGNOSIS — E78.2 MIXED HYPERLIPIDEMIA: ICD-10-CM

## 2019-08-26 DIAGNOSIS — M17.12 LEFT KNEE DJD: ICD-10-CM

## 2019-08-26 DIAGNOSIS — M25.562 CHRONIC PAIN OF LEFT KNEE: ICD-10-CM

## 2019-08-26 DIAGNOSIS — I10 ESSENTIAL HYPERTENSION: ICD-10-CM

## 2019-08-26 DIAGNOSIS — N13.8 BENIGN PROSTATIC HYPERPLASIA WITH URINARY OBSTRUCTION: ICD-10-CM

## 2019-08-26 DIAGNOSIS — M17.12 PRIMARY OSTEOARTHRITIS OF LEFT KNEE: Primary | ICD-10-CM

## 2019-08-26 PROBLEM — H90.3 BILATERAL SENSORINEURAL HEARING LOSS: Status: ACTIVE | Noted: 2017-01-23

## 2019-08-26 PROBLEM — H93.13 TINNITUS, BILATERAL: Status: ACTIVE | Noted: 2017-01-23

## 2019-08-26 PROCEDURE — 99999 PR PBB SHADOW E&M-EST. PATIENT-LVL III: ICD-10-PCS | Mod: PBBFAC,,, | Performed by: INTERNAL MEDICINE

## 2019-08-26 PROCEDURE — 99214 PR OFFICE/OUTPT VISIT, EST, LEVL IV, 30-39 MIN: ICD-10-PCS | Mod: S$GLB,,, | Performed by: INTERNAL MEDICINE

## 2019-08-26 PROCEDURE — 99214 OFFICE O/P EST MOD 30 MIN: CPT | Mod: S$GLB,,, | Performed by: INTERNAL MEDICINE

## 2019-08-26 PROCEDURE — 99999 PR PBB SHADOW E&M-EST. PATIENT-LVL III: CPT | Mod: PBBFAC,,, | Performed by: INTERNAL MEDICINE

## 2019-08-26 RX ORDER — SODIUM CHLORIDE 9 MG/ML
INJECTION, SOLUTION INTRAVENOUS CONTINUOUS
Status: CANCELLED | OUTPATIENT
Start: 2019-08-26

## 2019-08-30 DIAGNOSIS — Z12.11 COLON CANCER SCREENING: ICD-10-CM

## 2019-09-03 DIAGNOSIS — Z96.652 S/P LEFT UNICOMPARTMENTAL KNEE REPLACEMENT: Primary | ICD-10-CM

## 2019-09-03 DIAGNOSIS — Z79.01 ENCOUNTER FOR MONITORING COUMADIN THERAPY: ICD-10-CM

## 2019-09-03 DIAGNOSIS — G89.18 POST-OP PAIN: ICD-10-CM

## 2019-09-03 DIAGNOSIS — M17.12 PRIMARY OSTEOARTHRITIS OF LEFT KNEE: ICD-10-CM

## 2019-09-03 DIAGNOSIS — Z96.652 S/P LEFT UNICOMPARTMENTAL KNEE REPLACEMENT: ICD-10-CM

## 2019-09-03 DIAGNOSIS — Z51.81 ENCOUNTER FOR MONITORING COUMADIN THERAPY: ICD-10-CM

## 2019-09-04 ENCOUNTER — HOSPITAL ENCOUNTER (OUTPATIENT)
Dept: PREADMISSION TESTING | Facility: HOSPITAL | Age: 64
Discharge: HOME | End: 2019-09-04
Attending: ORTHOPAEDIC SURGERY
Payer: COMMERCIAL

## 2019-09-04 ENCOUNTER — DOCUMENTATION ONLY (OUTPATIENT)
Dept: REHABILITATION | Facility: HOSPITAL | Age: 64
End: 2019-09-04

## 2019-09-04 ENCOUNTER — OFFICE VISIT (OUTPATIENT)
Dept: FAMILY MEDICINE | Facility: CLINIC | Age: 64
End: 2019-09-04
Payer: COMMERCIAL

## 2019-09-04 VITALS
SYSTOLIC BLOOD PRESSURE: 130 MMHG | DIASTOLIC BLOOD PRESSURE: 72 MMHG | HEART RATE: 77 BPM | OXYGEN SATURATION: 97 % | BODY MASS INDEX: 26.76 KG/M2 | WEIGHT: 181.19 LBS

## 2019-09-04 DIAGNOSIS — M25.552 LEFT HIP PAIN: Primary | ICD-10-CM

## 2019-09-04 PROCEDURE — 99213 OFFICE O/P EST LOW 20 MIN: CPT | Mod: S$GLB,,, | Performed by: INTERNAL MEDICINE

## 2019-09-04 PROCEDURE — 99999 PR PBB SHADOW E&M-EST. PATIENT-LVL III: CPT | Mod: PBBFAC,,, | Performed by: INTERNAL MEDICINE

## 2019-09-04 PROCEDURE — 99999 PR PBB SHADOW E&M-EST. PATIENT-LVL III: ICD-10-PCS | Mod: PBBFAC,,, | Performed by: INTERNAL MEDICINE

## 2019-09-04 PROCEDURE — 99213 PR OFFICE/OUTPT VISIT, EST, LEVL III, 20-29 MIN: ICD-10-PCS | Mod: S$GLB,,, | Performed by: INTERNAL MEDICINE

## 2019-09-04 RX ORDER — HYDROCODONE BITARTRATE AND ACETAMINOPHEN 5; 325 MG/1; MG/1
1 TABLET ORAL EVERY 8 HOURS PRN
Qty: 20 TABLET | Refills: 0 | Status: SHIPPED | OUTPATIENT
Start: 2019-09-04 | End: 2019-10-29

## 2019-09-04 RX ORDER — WARFARIN SODIUM 5 MG/1
5 TABLET ORAL DAILY
Qty: 30 TABLET | Refills: 0 | Status: SHIPPED | OUTPATIENT
Start: 2019-09-04 | End: 2019-10-29

## 2019-09-04 NOTE — OR NURSING
JOINT CAMP ASSESSMENT    Name Edy Bartlett III   MRN [id number]    Age/Sex 64 y.o. male    Surgeon No ref. provider found   Joint Camp Date 9/4/2019   Surgery Date    Procedure    Insurance Payor: AETNA / Plan: AETNA OPEN ACCESS / Product Type: HMO /    Care Team Patient Care Team:  Reddy Vidal MD as PCP - General (Family Medicine)    Pharmacy   [facility]/pharmacy #[id number] - [place], [place] - [address]  [address]  [place] [address]  Phone: [phone] Fax: [phone]    [facility]  [address]  [place] [address]  Phone: [phone] Fax: [phone]     AM-PAC Score      Risk Assessment Score      Past Medical History:   Diagnosis Date    BPH (benign prostatic hypertrophy) with urinary obstruction     Cancer     Complication of anesthesia     gets very sore neck post-op if his head is twisted or bent too far with intubation    DDD (degenerative disc disease), cervical     DDD (degenerative disc disease), lumbar     DJD (degenerative joint disease) of knee 12/10/2012    ED (erectile dysfunction)     Elevated PSA     Hyperlipidemia     Hypertension     Mass of skin 2012    left temple, possible lipoma    Mobility impaired     cane    Neuropathy     feet    OA (osteoarthritis of spine)     Respiratory distress     has had neck fusion    Tattoo Dec 2012    left upper arm       Past Surgical History:   Procedure Laterality Date    APPENDECTOMY      ARTHROSCOPY, KNEE Left 12/18/2012    Performed by [facility] Fortune [facility] at [facility] OR    BIOPSY-NASAL SINUS NASOPHARYNGEAL N/A 3/20/2015    Performed by Rachel [facility]on [facility] at [facility] OR    CARPAL TUNNEL RELEASE      both hands/ lt hand x 2/ rt 1    CERVICAL SPINE SURGERY      x 2;fusion C3-7, can only bend his neck a little    EPIDURAL BLOCK INJECTION      injection for pain    FRACTURE SURGERY      ankle    hematoma removed  2006    thigh    HERNIA REPAIR      R inguinal    KNEE ARTHROSCOPY      PROSTATE BIOPSY       TENDON REPAIR      TRIGGER FINGER RELEASE  2007    left    TUMOR EXCISION      fatty tumor to left front of head, has since grown back         Home Enviroment     Living Arrangement: Lives with spouse  Home Environment: 1-story house/ trailer  Home Safety Concerns: not applicable    DISCHARGE CAREGIVER/SUPPORT SYSTEM     Identified post-op caregiver: Patient has spouse / significant other.  Patient's caregiver(s) will be able to provide physical assistance. Patient will have someone to assist overnight.    Caregiver present at pre-op interview:  No      PRE-OPERATIVE FUNCTIONAL STATUS     Employment: Retired    Pre-op Functional Status: Patient will be independent with mobility/ambulation, transfers, ADL's, IADL's.    Use of assistive device for ambulation: straight cane  ADL: self care  ADL Limitations: none  Medical Restrictions: Decreased range of motions in extremities    POTENTIAL BARRIERS TO DISCHARGE/POTENTIAL POST-OP COMPLICATIONS     Patient stated that he did not have anyone to stay with him postoperatively. When asked further he stated he had a wife that he may be estranged from and that his daughter lived next door. Inconsistencies in his report regarding the caregiver is a concern I will go over with Dr. Silva.        DISCHARGE PLAN     Expected LOS of 2 days or less for joint replacement discussed with patient. We also discussed a discharge path of HH for approximately one week with a transition to outpatient PT on the second week given no post-op complications.      Patient in agreement with discharge plan: Yes    Discharge to: Discharge home with home health (PT/OT) x2 weeks with transition to outpatient PT     HH:  [facility]      OP PT: Outpatient physical therapy decided with patient in clinic at 2 week follow up visit.     Home DME: rolling walker, bedside commode and wheelchair    Needed DME at D/C: none     Rx: Per Dr. Silva at discharge     Meds to Beds: N/A  Patient expected to discharge on  Coumadin (Warfarin) for DVT prophylaxis. Coumadin Clinic Needed: Yes  Location: [facility]

## 2019-09-04 NOTE — PROGRESS NOTES
Performed walker training with patient today. Patient able to achieve proper sequencing with rolling walker during ambulation and with sit to stand transfers. Patient reported no questions at end of training session.    Alieen Blackwell, PTA

## 2019-09-04 NOTE — OR NURSING
"               [facility] Risk Assessment Scale    Patient Name: Edy Bartlett III  YOB: 1955  MRN: [id number]            RIsk Factor Measure Recommendation Patient Data Scale/Score   BMI >40 Reconsider surgery, weight loss   Estimated body mass index is 26.76 kg/m² as calculated from the following:    Height as of an earlier encounter on 9/4/19: 5' 9" (1.753 m).    Weight as of an earlier encounter on 9/4/19: 82.2 kg (181 lb 3.5 oz).   [] 0 = 1 - 24.9  [x] 1 = 25-29.9  [] 2 = 30-34.9  [] 3 = 35-39.9  [] 4 = 40-44.9  [] 5 = 45-99.9   Hemoglobin AIC (if applicable) >9 Delay surgery until DM under control  Refer for:  · Nutrition Therapy  · Exercise   · Medication    Lab Results   Component Value Date    HGBA1C 5.6 09/12/2013       Lab Results   Component Value Date    GLU 87 08/23/2019      [x] 0 = 4.0-5.6  [] 1 = 5.7-6.4  [] 2 = 6.5-6.9  [] 3 = 7.0-7.9  [] 4 = 8.0-8.9  [] 5 = 9.0-12   Hemoglobin (Anemia) <9 Delay surgery   Correct anemia Lab Results   Component Value Date    HGB 17.1 08/23/2019    [] 20 - <9.0                    Albumin <3 Delay surgery &Workup Lab Results   Component Value Date    ALBUMIN 3.7 08/23/2019    [] 20 - <3.0   Smoking Cessation >4 Weeks Delay Surgery  Refer to OP Cessation Class    n/a [] 20 - current smoker                                _____ PPD                    Hx of MI, PE, Arrhythmia, CVA, DVT <30 Days Delay Surgery    n/a [] 20      Infection Variable Delay surgery and re-evaluate   n/a [] 20 - recent/current infection     Depression (PHQ) >10 out of 27 Delay Surgery and re-evaluate  Medication  Counseling              [x] 0     []1     []2     []3      []4      [] 5                    (1-4)      (5-9)  (10-14)  (15-19)   (20-27)     Memory Impairment & Memory loss (Mini-Cog Screening Tool) Advanced dementia and/or Parkinson's Reconsider surgery     [] 0     [x]1     []2     []3     []4     [] 5     Physical Conditioning (Modified AM-PAC Per Physical Therapy at [facility]) " Unable to ambulate on day of surgery Delay surgery and re-evaluate  Pre-Rehabilitation   (PT evaluation)       [x]  0   []4       []8     []12        []16     []20       (<20%)   (<40%)   (<60%)   (<80% )    (>80%)     Home Environment/Caregiver support  (Per /Navigator Interview)    Availability of basic services and/or approprate assistance during post-operative period Delay surgery and re-evaluate  Safe home environment  Health   1 week post-surgery  Transportation  availability  Ability to obtain DME/Medications post-op    [x] 0     []1     []2     []3     []4     [] 5  [] 0     []1     [x]2     []3     []4     [] 5  [x] 0     []1     []2     []3     []4     [] 5  [x] 0     []1     []2     []3     []4     [] 5         MD Contact: Shira Comments: Slight concern regarding reported memory issues as well as availability of . Will discuss with physician Total Score:  4

## 2019-09-04 NOTE — OR NURSING
Patient Name: Edy Bartlett III  YOB: 1955   MRN: [id number]     [facility]   Basic Mobility Inpatient Short Form 6 Clicks         How much difficulty does the patient currently have  Unable  A Lot  A Little  None      1. Turning over in bed (including adjusting bedclothes, sheets and blankets)?     1 []    2 []    3 []    4 [x]        2. Sitting down on and standing up from a chair with arms (e.g., wheelchair, bedside commode, etc.)     1 []  2 []  3 [x]     4 []      3. Moving from lying on back to sitting on the side of the bed?     1 []  2 []  3 []    4 [x]    How much help from another person does the patient currently need  Total  A Lot  A Little  None      4. Moving to and from a bed to a chair (including a wheelchair)?    1 []  2 []  3 []    4 [x]      5. Need to walk in hospital room?    1 []  2 []  3 []    4 [x]      6. Climbing 3-5 steps with a railing?    1 []  2 []  3 []    4 [x]       Raw Score:    23              CMS 0-100% Score:    11.20        %   Standardized Score:  56.93             CMS Modifier:      CI                                    [facility]   Basic [facility]ility [facility] Short Form 6 Clicks Score Conversion Table*         *Use this form to convert -PAC Basic Mobility Inpatient Raw Scores.   [facility] Inpatient Basic Mobility Short Form Scoring Example   1. Add the number values associated with the response to each item. For example, items totals yield a Raw Score of 21.   2. Match the raw score to the t-Scale scores (t-Scale score = 50.25, SE = 4.69).   3. Find the associated CMS % (CMS % = 28.97%).   4. Locate the correct CMS Functional Modifier Code, or G Code (G code = CJ)     NOTE: Each -PAC Short Form has a separate conversion table. Make sure that you use the correct conversion table.       Instruction Manual - page 45 contains conversion table

## 2019-09-05 NOTE — PROGRESS NOTES
Subjective     Edy Bartlett III is a 64 y.o. old, male here for Pain (left hip)    Patient complains of anterior and lateral left hip pain. Pain started a day after he was mowing and ran to get a soccer ball. He is not sure if this is related to his chronic back pain and not sure if pain is radiating from the back. Tramadol helps as does Ibuprofen but tramadol stimulates him at night. He has not had this issue with other opiates in the past. Pain is worse when lying on the affected side. It has improved a little since onset. There is some anxiety associated with his pain.    Review of Systems   Constitutional: Negative.    Genitourinary: Negative.    Musculoskeletal: Positive for back pain and joint pain.   Neurological: Negative for sensory change and focal weakness.     Medications     Outpatient Medications Marked as Taking for the 9/4/19 encounter (Office Visit) with Reddy Vidal MD   Medication Sig Dispense Refill    cyclobenzaprine (FLEXERIL) 10 MG tablet Take 10 mg by mouth as needed.  0    ibuprofen (ADVIL,MOTRIN) 200 MG tablet Take 1 tablet by mouth as needed.      lisinopril (PRINIVIL,ZESTRIL) 20 MG tablet Take 20 mg by mouth once daily.  2    traMADol (ULTRAM) 50 mg tablet Take 50 mg by mouth every 8 (eight) hours as needed.  0     Objective     /72 (Patient Position: Sitting)   Pulse 77   Wt 82.2 kg (181 lb 3.5 oz)   SpO2 97%   BMI 26.76 kg/m²   Physical Exam   Constitutional: He appears well-developed. He appears distressed.   Musculoskeletal:        Left hip: He exhibits normal range of motion and normal strength.   Mild tenderness over greater trochanter   Neurological: He is alert. He exhibits normal muscle tone.   SLR reproduces back pain     Assessment and Plan     1. Left hip pain  Referred pain from DDD and nerve impingement vs trochanteric bursitis vs worsening OA related pain. Ibuprofen and tramadol sparingly in the day. Activity as tolerated. Hydrocodone sparingly at  night.    ___________________  Reddy Vidal MD  Internal Medicine and Pediatrics

## 2019-09-06 DIAGNOSIS — Z96.652 S/P LEFT UNICOMPARTMENTAL KNEE REPLACEMENT: Primary | ICD-10-CM

## 2019-09-06 RX ORDER — OXYCODONE AND ACETAMINOPHEN 5; 325 MG/1; MG/1
1 TABLET ORAL
Qty: 42 TABLET | Refills: 0 | Status: SHIPPED | OUTPATIENT
Start: 2019-09-06 | End: 2019-09-25 | Stop reason: SDUPTHER

## 2019-09-06 RX ORDER — CEPHALEXIN 500 MG/1
500 CAPSULE ORAL EVERY 6 HOURS
Qty: 20 CAPSULE | Refills: 0 | Status: SHIPPED | OUTPATIENT
Start: 2019-09-06 | End: 2019-10-29

## 2019-09-09 ENCOUNTER — ANESTHESIA EVENT (OUTPATIENT)
Dept: SURGERY | Facility: HOSPITAL | Age: 64
End: 2019-09-09
Payer: COMMERCIAL

## 2019-09-10 ENCOUNTER — ANESTHESIA (OUTPATIENT)
Dept: SURGERY | Facility: HOSPITAL | Age: 64
End: 2019-09-10
Payer: COMMERCIAL

## 2019-09-10 ENCOUNTER — CLINICAL SUPPORT (OUTPATIENT)
Dept: REHABILITATION | Facility: HOSPITAL | Age: 64
End: 2019-09-10
Payer: COMMERCIAL

## 2019-09-10 ENCOUNTER — HOSPITAL ENCOUNTER (OUTPATIENT)
Dept: RADIOLOGY | Facility: HOSPITAL | Age: 64
Discharge: HOME | End: 2019-09-10
Attending: ORTHOPAEDIC SURGERY | Admitting: ORTHOPAEDIC SURGERY
Payer: COMMERCIAL

## 2019-09-10 ENCOUNTER — HOSPITAL ENCOUNTER (OUTPATIENT)
Facility: HOSPITAL | Age: 64
Discharge: HOME | End: 2019-09-10
Attending: ORTHOPAEDIC SURGERY | Admitting: ORTHOPAEDIC SURGERY
Payer: COMMERCIAL

## 2019-09-10 DIAGNOSIS — R29.898 WEAKNESS OF LEFT LOWER EXTREMITY: ICD-10-CM

## 2019-09-10 DIAGNOSIS — M17.12 PRIMARY OSTEOARTHRITIS OF LEFT KNEE: ICD-10-CM

## 2019-09-10 DIAGNOSIS — R26.81 GAIT INSTABILITY: ICD-10-CM

## 2019-09-10 DIAGNOSIS — G89.29 CHRONIC PAIN OF LEFT KNEE: ICD-10-CM

## 2019-09-10 DIAGNOSIS — M25.562 CHRONIC PAIN OF LEFT KNEE: ICD-10-CM

## 2019-09-10 DIAGNOSIS — M17.12 LEFT KNEE DJD: ICD-10-CM

## 2019-09-10 PROCEDURE — 76942 ECHO GUIDE FOR BIOPSY: CPT | Mod: 26,,, | Performed by: ANESTHESIOLOGY

## 2019-09-10 PROCEDURE — 73560 X-RAY EXAM OF KNEE 1 OR 2: CPT | Mod: 26,LT,, | Performed by: RADIOLOGY

## 2019-09-10 PROCEDURE — 37000008 HC ANESTHESIA 1ST 15 MINUTES: Mod: PO | Performed by: ORTHOPAEDIC SURGERY

## 2019-09-10 PROCEDURE — 73560 XR KNEE 1 OR 2 VIEW LEFT: ICD-10-PCS | Mod: 26,LT,, | Performed by: RADIOLOGY

## 2019-09-10 PROCEDURE — 73560 X-RAY EXAM OF KNEE 1 OR 2: CPT | Mod: TC,FY,PO,LT

## 2019-09-10 PROCEDURE — C9290 INJ, BUPIVACAINE LIPOSOME: HCPCS | Mod: PO | Performed by: ANESTHESIOLOGY

## 2019-09-10 PROCEDURE — 63600175 PHARM REV CODE 636 W HCPCS: Mod: PO | Performed by: ORTHOPAEDIC SURGERY

## 2019-09-10 PROCEDURE — 64447 PR NERVE BLOCK INJ, ANES/STEROID, FEMORAL, INCL IMAG GUIDANCE: ICD-10-PCS | Mod: 59,LT,, | Performed by: ANESTHESIOLOGY

## 2019-09-10 PROCEDURE — 25000003 PHARM REV CODE 250: Mod: PO | Performed by: ANESTHESIOLOGY

## 2019-09-10 PROCEDURE — 27201423 OPTIME MED/SURG SUP & DEVICES STERILE SUPPLY: Mod: PO | Performed by: ORTHOPAEDIC SURGERY

## 2019-09-10 PROCEDURE — 25000003 PHARM REV CODE 250: Mod: PO | Performed by: ORTHOPAEDIC SURGERY

## 2019-09-10 PROCEDURE — D9220A PRA ANESTHESIA: ICD-10-PCS | Mod: CRNA,,, | Performed by: NURSE ANESTHETIST, CERTIFIED REGISTERED

## 2019-09-10 PROCEDURE — 97162 PT EVAL MOD COMPLEX 30 MIN: CPT | Mod: PO

## 2019-09-10 PROCEDURE — 36000711: Mod: PO | Performed by: ORTHOPAEDIC SURGERY

## 2019-09-10 PROCEDURE — 71000033 HC RECOVERY, INTIAL HOUR: Mod: PO | Performed by: ORTHOPAEDIC SURGERY

## 2019-09-10 PROCEDURE — D9220A PRA ANESTHESIA: Mod: ANES,,, | Performed by: ANESTHESIOLOGY

## 2019-09-10 PROCEDURE — C1713 ANCHOR/SCREW BN/BN,TIS/BN: HCPCS | Mod: PO | Performed by: ORTHOPAEDIC SURGERY

## 2019-09-10 PROCEDURE — 63600175 PHARM REV CODE 636 W HCPCS: Mod: PO | Performed by: ANESTHESIOLOGY

## 2019-09-10 PROCEDURE — 27446 PR PLASTY KNEE,MED OR LAT COMPARTMT: ICD-10-PCS | Mod: LT,,, | Performed by: ORTHOPAEDIC SURGERY

## 2019-09-10 PROCEDURE — 76942 PR U/S GUIDANCE FOR NEEDLE GUIDANCE: ICD-10-PCS | Mod: 26,,, | Performed by: ANESTHESIOLOGY

## 2019-09-10 PROCEDURE — 27446 REVISION OF KNEE JOINT: CPT | Mod: LT,,, | Performed by: ORTHOPAEDIC SURGERY

## 2019-09-10 PROCEDURE — 36000710: Mod: PO | Performed by: ORTHOPAEDIC SURGERY

## 2019-09-10 PROCEDURE — D9220A PRA ANESTHESIA: Mod: CRNA,,, | Performed by: NURSE ANESTHETIST, CERTIFIED REGISTERED

## 2019-09-10 PROCEDURE — C1776 JOINT DEVICE (IMPLANTABLE): HCPCS | Mod: PO | Performed by: ORTHOPAEDIC SURGERY

## 2019-09-10 PROCEDURE — 63600175 PHARM REV CODE 636 W HCPCS: Mod: PO | Performed by: NURSE ANESTHETIST, CERTIFIED REGISTERED

## 2019-09-10 PROCEDURE — 64447 NJX AA&/STRD FEMORAL NRV IMG: CPT | Mod: PO | Performed by: ANESTHESIOLOGY

## 2019-09-10 PROCEDURE — 37000009 HC ANESTHESIA EA ADD 15 MINS: Mod: PO | Performed by: ORTHOPAEDIC SURGERY

## 2019-09-10 PROCEDURE — 71000016 HC POSTOP RECOV ADDL HR: Mod: PO | Performed by: ORTHOPAEDIC SURGERY

## 2019-09-10 PROCEDURE — 64447 NJX AA&/STRD FEMORAL NRV IMG: CPT | Mod: 59,LT,, | Performed by: ANESTHESIOLOGY

## 2019-09-10 PROCEDURE — 71000015 HC POSTOP RECOV 1ST HR: Mod: PO | Performed by: ORTHOPAEDIC SURGERY

## 2019-09-10 PROCEDURE — S0020 INJECTION, BUPIVICAINE HYDRO: HCPCS | Mod: PO | Performed by: ANESTHESIOLOGY

## 2019-09-10 PROCEDURE — D9220A PRA ANESTHESIA: ICD-10-PCS | Mod: ANES,,, | Performed by: ANESTHESIOLOGY

## 2019-09-10 DEVICE — IMPLANTABLE DEVICE: Type: IMPLANTABLE DEVICE | Site: KNEE | Status: FUNCTIONAL

## 2019-09-10 DEVICE — COMPONENT TIBIAL UNICOMPART: Type: IMPLANTABLE DEVICE | Site: KNEE | Status: FUNCTIONAL

## 2019-09-10 DEVICE — COMPONENT FEMUR MD SZ IMPLANT: Type: IMPLANTABLE DEVICE | Site: KNEE | Status: FUNCTIONAL

## 2019-09-10 RX ORDER — LIDOCAINE HYDROCHLORIDE 10 MG/ML
1 INJECTION, SOLUTION EPIDURAL; INFILTRATION; INTRACAUDAL; PERINEURAL ONCE
Status: COMPLETED | OUTPATIENT
Start: 2019-09-10 | End: 2019-09-10

## 2019-09-10 RX ORDER — DIPHENHYDRAMINE HYDROCHLORIDE 50 MG/ML
25 INJECTION INTRAMUSCULAR; INTRAVENOUS EVERY 6 HOURS PRN
Status: CANCELLED | OUTPATIENT
Start: 2019-09-10

## 2019-09-10 RX ORDER — PROPOFOL 10 MG/ML
VIAL (ML) INTRAVENOUS
Status: DISCONTINUED | OUTPATIENT
Start: 2019-09-10 | End: 2019-09-10

## 2019-09-10 RX ORDER — FENTANYL CITRATE 50 UG/ML
INJECTION, SOLUTION INTRAMUSCULAR; INTRAVENOUS
Status: DISCONTINUED | OUTPATIENT
Start: 2019-09-10 | End: 2019-09-10

## 2019-09-10 RX ORDER — BACITRACIN 50000 [IU]/1
INJECTION, POWDER, FOR SOLUTION INTRAMUSCULAR
Status: DISCONTINUED | OUTPATIENT
Start: 2019-09-10 | End: 2019-09-10 | Stop reason: HOSPADM

## 2019-09-10 RX ORDER — CELECOXIB 200 MG/1
400 CAPSULE ORAL ONCE
Status: COMPLETED | OUTPATIENT
Start: 2019-09-10 | End: 2019-09-10

## 2019-09-10 RX ORDER — CEFAZOLIN SODIUM 2 G/50ML
2 SOLUTION INTRAVENOUS
Status: COMPLETED | OUTPATIENT
Start: 2019-09-10 | End: 2019-09-10

## 2019-09-10 RX ORDER — MIDAZOLAM HYDROCHLORIDE 1 MG/ML
INJECTION, SOLUTION INTRAMUSCULAR; INTRAVENOUS
Status: DISCONTINUED | OUTPATIENT
Start: 2019-09-10 | End: 2019-09-10

## 2019-09-10 RX ORDER — FENTANYL CITRATE 50 UG/ML
25 INJECTION, SOLUTION INTRAMUSCULAR; INTRAVENOUS EVERY 5 MIN PRN
Status: DISCONTINUED | OUTPATIENT
Start: 2019-09-10 | End: 2019-09-10 | Stop reason: HOSPADM

## 2019-09-10 RX ORDER — BUPIVACAINE HYDROCHLORIDE 7.5 MG/ML
INJECTION, SOLUTION EPIDURAL; RETROBULBAR
Status: COMPLETED | OUTPATIENT
Start: 2019-09-10 | End: 2019-09-10

## 2019-09-10 RX ORDER — ACETAMINOPHEN 10 MG/ML
INJECTION, SOLUTION INTRAVENOUS
Status: DISCONTINUED | OUTPATIENT
Start: 2019-09-10 | End: 2019-09-10

## 2019-09-10 RX ORDER — HYDROMORPHONE HYDROCHLORIDE 2 MG/ML
0.2 INJECTION, SOLUTION INTRAMUSCULAR; INTRAVENOUS; SUBCUTANEOUS EVERY 5 MIN PRN
Status: CANCELLED | OUTPATIENT
Start: 2019-09-10

## 2019-09-10 RX ORDER — SODIUM CHLORIDE 0.9 G/100ML
IRRIGANT IRRIGATION
Status: DISCONTINUED | OUTPATIENT
Start: 2019-09-10 | End: 2019-09-10 | Stop reason: HOSPADM

## 2019-09-10 RX ORDER — ONDANSETRON 2 MG/ML
INJECTION INTRAMUSCULAR; INTRAVENOUS
Status: DISCONTINUED | OUTPATIENT
Start: 2019-09-10 | End: 2019-09-10

## 2019-09-10 RX ORDER — CEFAZOLIN SODIUM 1 G/50ML
1 SOLUTION INTRAVENOUS ONCE
Status: COMPLETED | OUTPATIENT
Start: 2019-09-10 | End: 2019-09-10

## 2019-09-10 RX ORDER — OXYCODONE HYDROCHLORIDE 5 MG/1
5 TABLET ORAL
Status: DISCONTINUED | OUTPATIENT
Start: 2019-09-10 | End: 2019-09-10 | Stop reason: HOSPADM

## 2019-09-10 RX ORDER — MEPERIDINE HYDROCHLORIDE 50 MG/ML
12.5 INJECTION INTRAMUSCULAR; INTRAVENOUS; SUBCUTANEOUS ONCE AS NEEDED
Status: CANCELLED | OUTPATIENT
Start: 2019-09-10 | End: 2019-09-11

## 2019-09-10 RX ORDER — SODIUM CHLORIDE 9 MG/ML
INJECTION, SOLUTION INTRAVENOUS CONTINUOUS
Status: DISCONTINUED | OUTPATIENT
Start: 2019-09-10 | End: 2019-09-10 | Stop reason: HOSPADM

## 2019-09-10 RX ORDER — FENTANYL CITRATE 50 UG/ML
25 INJECTION, SOLUTION INTRAMUSCULAR; INTRAVENOUS EVERY 5 MIN PRN
Status: CANCELLED | OUTPATIENT
Start: 2019-09-10

## 2019-09-10 RX ORDER — LIDOCAINE HCL/PF 100 MG/5ML
SYRINGE (ML) INTRAVENOUS
Status: DISCONTINUED | OUTPATIENT
Start: 2019-09-10 | End: 2019-09-10

## 2019-09-10 RX ORDER — DIPHENHYDRAMINE HYDROCHLORIDE 50 MG/ML
25 INJECTION INTRAMUSCULAR; INTRAVENOUS EVERY 6 HOURS PRN
Status: DISCONTINUED | OUTPATIENT
Start: 2019-09-10 | End: 2019-09-10 | Stop reason: HOSPADM

## 2019-09-10 RX ORDER — MIDAZOLAM HYDROCHLORIDE 1 MG/ML
0.5 INJECTION INTRAMUSCULAR; INTRAVENOUS
Status: DISCONTINUED | OUTPATIENT
Start: 2019-09-10 | End: 2019-09-10 | Stop reason: HOSPADM

## 2019-09-10 RX ORDER — MEPERIDINE HYDROCHLORIDE 50 MG/ML
12.5 INJECTION INTRAMUSCULAR; INTRAVENOUS; SUBCUTANEOUS ONCE AS NEEDED
Status: DISCONTINUED | OUTPATIENT
Start: 2019-09-10 | End: 2019-09-10 | Stop reason: HOSPADM

## 2019-09-10 RX ORDER — PREGABALIN 75 MG/1
75 CAPSULE ORAL ONCE
Status: DISCONTINUED | OUTPATIENT
Start: 2019-09-10 | End: 2019-09-10

## 2019-09-10 RX ORDER — OXYCODONE HCL 10 MG/1
10 TABLET, FILM COATED, EXTENDED RELEASE ORAL ONCE
Status: COMPLETED | OUTPATIENT
Start: 2019-09-10 | End: 2019-09-10

## 2019-09-10 RX ORDER — SODIUM CHLORIDE, SODIUM LACTATE, POTASSIUM CHLORIDE, CALCIUM CHLORIDE 600; 310; 30; 20 MG/100ML; MG/100ML; MG/100ML; MG/100ML
1000 INJECTION, SOLUTION INTRAVENOUS ONCE
Status: COMPLETED | OUTPATIENT
Start: 2019-09-10 | End: 2019-09-10

## 2019-09-10 RX ORDER — PROPOFOL 10 MG/ML
VIAL (ML) INTRAVENOUS CONTINUOUS PRN
Status: DISCONTINUED | OUTPATIENT
Start: 2019-09-10 | End: 2019-09-10

## 2019-09-10 RX ORDER — BUPIVACAINE HYDROCHLORIDE 5 MG/ML
INJECTION, SOLUTION EPIDURAL; INTRACAUDAL
Status: COMPLETED | OUTPATIENT
Start: 2019-09-10 | End: 2019-09-10

## 2019-09-10 RX ORDER — HYDROMORPHONE HYDROCHLORIDE 2 MG/ML
0.2 INJECTION, SOLUTION INTRAMUSCULAR; INTRAVENOUS; SUBCUTANEOUS EVERY 5 MIN PRN
Status: DISCONTINUED | OUTPATIENT
Start: 2019-09-10 | End: 2019-09-10 | Stop reason: HOSPADM

## 2019-09-10 RX ORDER — SODIUM CHLORIDE, SODIUM LACTATE, POTASSIUM CHLORIDE, CALCIUM CHLORIDE 600; 310; 30; 20 MG/100ML; MG/100ML; MG/100ML; MG/100ML
INJECTION, SOLUTION INTRAVENOUS CONTINUOUS PRN
Status: DISCONTINUED | OUTPATIENT
Start: 2019-09-10 | End: 2019-09-10

## 2019-09-10 RX ORDER — OXYCODONE HYDROCHLORIDE 5 MG/1
5 TABLET ORAL
Status: CANCELLED | OUTPATIENT
Start: 2019-09-10

## 2019-09-10 RX ADMIN — SODIUM CHLORIDE 1000 MG: 9 INJECTION, SOLUTION INTRAVENOUS at 11:09

## 2019-09-10 RX ADMIN — ONDANSETRON 4 MG: 2 INJECTION, SOLUTION INTRAMUSCULAR; INTRAVENOUS at 10:09

## 2019-09-10 RX ADMIN — FENTANYL CITRATE 100 MCG: 50 INJECTION INTRAMUSCULAR; INTRAVENOUS at 08:09

## 2019-09-10 RX ADMIN — CEFAZOLIN SODIUM 1 G: 1 SOLUTION INTRAVENOUS at 12:09

## 2019-09-10 RX ADMIN — SODIUM CHLORIDE, SODIUM LACTATE, POTASSIUM CHLORIDE, AND CALCIUM CHLORIDE: .6; .31; .03; .02 INJECTION, SOLUTION INTRAVENOUS at 09:09

## 2019-09-10 RX ADMIN — MIDAZOLAM HYDROCHLORIDE 1 MG: 1 INJECTION, SOLUTION INTRAMUSCULAR; INTRAVENOUS at 09:09

## 2019-09-10 RX ADMIN — SODIUM CHLORIDE, SODIUM LACTATE, POTASSIUM CHLORIDE, AND CALCIUM CHLORIDE 1000 ML: .6; .31; .03; .02 INJECTION, SOLUTION INTRAVENOUS at 08:09

## 2019-09-10 RX ADMIN — ACETAMINOPHEN 1000 MG: 10 INJECTION, SOLUTION INTRAVENOUS at 10:09

## 2019-09-10 RX ADMIN — CELECOXIB 400 MG: 200 CAPSULE ORAL at 08:09

## 2019-09-10 RX ADMIN — LIDOCAINE HYDROCHLORIDE 1 MG: 10 INJECTION, SOLUTION EPIDURAL; INFILTRATION; INTRACAUDAL; PERINEURAL at 08:09

## 2019-09-10 RX ADMIN — SODIUM CHLORIDE 1000 MG: 9 INJECTION, SOLUTION INTRAVENOUS at 10:09

## 2019-09-10 RX ADMIN — BUPIVACAINE HYDROCHLORIDE 10 ML: 5 INJECTION, SOLUTION EPIDURAL; INTRACAUDAL; PERINEURAL at 08:09

## 2019-09-10 RX ADMIN — PROPOFOL 50 MG: 10 INJECTION, EMULSION INTRAVENOUS at 10:09

## 2019-09-10 RX ADMIN — BUPIVACAINE HYDROCHLORIDE 2 ML: 7.5 INJECTION, SOLUTION EPIDURAL; RETROBULBAR at 10:09

## 2019-09-10 RX ADMIN — OXYCODONE HYDROCHLORIDE 10 MG: 10 TABLET, FILM COATED, EXTENDED RELEASE ORAL at 08:09

## 2019-09-10 RX ADMIN — BUPIVACAINE 20 ML: 13.3 INJECTION, SUSPENSION, LIPOSOMAL INFILTRATION at 08:09

## 2019-09-10 RX ADMIN — CEFAZOLIN SODIUM 2 G: 2 SOLUTION INTRAVENOUS at 09:09

## 2019-09-10 RX ADMIN — MIDAZOLAM HYDROCHLORIDE 2 MG: 1 INJECTION, SOLUTION INTRAMUSCULAR; INTRAVENOUS at 08:09

## 2019-09-10 RX ADMIN — PROPOFOL 100 MCG/KG/MIN: 10 INJECTION, EMULSION INTRAVENOUS at 10:09

## 2019-09-10 RX ADMIN — FENTANYL CITRATE 50 MCG: 50 INJECTION, SOLUTION INTRAMUSCULAR; INTRAVENOUS at 09:09

## 2019-09-10 RX ADMIN — LIDOCAINE HYDROCHLORIDE 75 MG: 20 INJECTION PARENTERAL at 10:09

## 2019-09-10 NOTE — ANESTHESIA PROCEDURE NOTES
Peripheral Block    Patient location during procedure: pre-op   Block not for primary anesthetic.  Reason for block: at surgeon's request and post-op pain management   Post-op Pain Location: DJD LEFT KNEE, TKA LEFT KNEE  Start time: 9/10/2019 8:45 AM  Timeout: 9/10/2019 8:45 AM   End time: 9/10/2019 8:53 AM    Staffing  Authorizing Provider: Dennis Wise MD  Performing Provider: Dennis Wise MD    Preanesthetic Checklist  Completed: patient identified, site marked, surgical consent, pre-op evaluation, timeout performed, IV checked, risks and benefits discussed and monitors and equipment checked  Peripheral Block  Patient position: supine  Prep: ChloraPrep  Patient monitoring: cardiac monitor, heart rate, continuous pulse ox, continuous capnometry and frequent blood pressure checks  Block type: adductor canal  Laterality: left  Injection technique: single shot  Needle  Needle type: Stimuplex   Needle gauge: 21 G  Needle length: 4 in  Needle localization: ultrasound guidance   -ultrasound image captured on disc.  Assessment  Injection assessment: negative parasthesia, local visualized surrounding nerve and negative aspiration  Heart rate change: no  Slow fractionated injection: yes  Additional Notes  EXPAREL 20 ml

## 2019-09-10 NOTE — DISCHARGE INSTRUCTIONS
Joint Replacement     Knee   After surgery until first follow-up visit:    DOS:   Keep leg elevated (toes above your nose) for several hours per day while recovering from surgery.   Use walker for comfort. You may put weight on affected leg as tolerated   Minimal activity and advance diet as tolerated   Keep operative site clean and dry for 48 hours   Remove ace dressing 2 days after surgery. You may then shower. Place clean dry dressing over incision site and reapply ace after each shower until your follow up visit with you physician.   Keep ice pack on the knee for 48-72 hours. Ice on for 30 minutes of each hour while awake to reduce pain and swelling.   Resume home medications    Home health will call the day following surgery to provide follow up.    Physical therapy will be done by home health for two weeks after surgery.    DONT:   Do not soak in tub.   No driving for 24 hours or while taking narcotic pain medication   DO NOT TAKE ADDITIONAL TYLENOL/ACETAMINOPHEN WHILE TAKING NARCOTIC PAIN MEDICATION THAT CONTAINS TYLENOL/ACETAMINOPHEN.    CALL PHYSICIAN FOR ANY QUESTIONS OR CONCERNS.    You will have a return appointment for 2 weeks    Exparel(bupivacaine) has been injected to provide approximately 72 hours of reduced pain after your surgery.  Do not remove the bracelet for five days  Report to your doctor as soon as possible the following:   Restlessness   Anxiety   Speech problems,    Lightheadedness   Numbness and tingling of the mouth and lips   Seizures    Metallic taste   Blurred vision   Tremors    Twitching   Depression   Extreme drowsiness  Avoid additional use of local anesthetics (such as dental procedures) for five days (96 hours)       Discharge Instructions: After Your Surgery  Youve just had surgery. During surgery, you were given medicine called anesthesia to keep you relaxed and free of pain. After surgery, you may have some pain or nausea. This is common. Here are some tips  for feeling better and getting well after surgery.     Stay on schedule with your medicine.   Going home  Your healthcare provider will show you how to take care of yourself when you go home. He or she will also answer your questions. Have an adult family member or friend drive you home. For the first 24 hours after your surgery:  · Do not drive or use heavy equipment.  · Do not make important decisions or sign legal papers.  · Do not drink alcohol.  · Have someone stay with you, if needed. He or she can watch for problems and help keep you safe.  Be sure to go to all follow-up visits with your healthcare provider. And rest after your surgery for as long as your healthcare provider tells you to.  Coping with pain  If you have pain after surgery, pain medicine will help you feel better. Take it as told, before pain becomes severe. Also, ask your healthcare provider or pharmacist about other ways to control pain. This might be with heat, ice, or relaxation. And follow any other instructions your surgeon or nurse gives you.  Tips for taking pain medicine  To get the best relief possible, remember these points:  · Pain medicines can upset your stomach. Taking them with a little food may help.  · Most pain relievers taken by mouth need at least 20 to 30 minutes to start to work.  · Taking medicine on a schedule can help you remember to take it. Try to time your medicine so that you can take it before starting an activity. This might be before you get dressed, go for a walk, or sit down for dinner.  · Constipation is a common side effect of pain medicines. Call your healthcare provider before taking any medicines such as laxatives or stool softeners to help ease constipation. Also ask if you should skip any foods. Drinking lots of fluids and eating foods such as fruits and vegetables that are high in fiber can also help. Remember, do not take laxatives unless your surgeon has prescribed them.  · Drinking alcohol and  taking pain medicine can cause dizziness and slow your breathing. It can even be deadly. Do not drink alcohol while taking pain medicine.  · Pain medicine can make you react more slowly to things. Do not drive or run machinery while taking pain medicine.  Your healthcare provider may tell you to take acetaminophen to help ease your pain. Ask him or her how much you are supposed to take each day. Acetaminophen or other pain relievers may interact with your prescription medicines or other over-the-counter (OTC) medicines. Some prescription medicines have acetaminophen and other ingredients. Using both prescription and OTC acetaminophen for pain can cause you to overdose. Read the labels on your OTC medicines with care. This will help you to clearly know the list of ingredients, how much to take, and any warnings. It may also help you not take too much acetaminophen. If you have questions or do not understand the information, ask your pharmacist or healthcare provider to explain it to you before you take the OTC medicine.  Managing nausea  Some people have an upset stomach after surgery. This is often because of anesthesia, pain, or pain medicine, or the stress of surgery. These tips will help you handle nausea and eat healthy foods as you get better. If you were on a special food plan before surgery, ask your healthcare provider if you should follow it while you get better. These tips may help:  · Do not push yourself to eat. Your body will tell you when to eat and how much.  · Start off with clear liquids and soup. They are easier to digest.  · Next try semi-solid foods, such as mashed potatoes, applesauce, and gelatin, as you feel ready.  · Slowly move to solid foods. Dont eat fatty, rich, or spicy foods at first.  · Do not force yourself to have 3 large meals a day. Instead eat smaller amounts more often.  · Take pain medicines with a small amount of solid food, such as crackers or toast, to avoid nausea.     Call  your surgeon if  · You still have pain an hour after taking medicine. The medicine may not be strong enough.  · You feel too sleepy, dizzy, or groggy. The medicine may be too strong.  · You have side effects like nausea, vomiting, or skin changes, such as rash, itching, or hives.       If you have obstructive sleep apnea  You were given anesthesia medicine during surgery to keep you comfortable and free of pain. After surgery, you may have more apnea spells because of this medicine and other medicines you were given. The spells may last longer than usual.   At home:  · Keep using the continuous positive airway pressure (CPAP) device when you sleep. Unless your healthcare provider tells you not to, use it when you sleep, day or night. CPAP is a common device used to treat obstructive sleep apnea.  · Talk with your provider before taking any pain medicine, muscle relaxants, or sedatives. Your provider will tell you about the possible dangers of taking these medicines.  Date Last Reviewed: 12/1/2016 © 2000-2017 [employer]. [address] [place] [address], [place] [address]. All rights reserved. This information is not intended as a substitute for professional medical care. Always follow your healthcare professional's instructions.

## 2019-09-10 NOTE — OP NOTE
[facility]     Op Report - Unicompartmental Knee Arthroplasty    DATE OF PROCEDURE: 9/10/2019      PROCEDURE: left  unicompartmental knee arthroplasty.     PREOPERATIVE DIAGNOSIS: left knee arthrosis.     POSTOPERATIVE DIAGNOSIS: left knee arthrosis.     SURGEON: Nelson Silva M.D.    Assistant: Fermin Alcocer RN CFA     ANESTHESIA: Regional     ESTIMATED BLOOD LOSS: < 20cc    FLUIDS: Crystalloid.     DRAINS: None.     TOURNIQUET TIME: Approximately 45 minutes at 300 mmHg.    Specimens:   Specimen (12h ago, onward)    None           INDICATIONS FOR PROCEDURE: [unfilled]  [unfilled] year old male with debilitating  left knee pain that has filled a long standing non-operative course and is interested in knee arthroplasty.        PROCEDURE IN DETAIL: After obtaining informed consent and starting the patient   on preoperative IV antibiotics, the patient was taken back to the Operating Room  where regional anesthesia was performed.  The left  lower extremity was prepped and draped in normal sterile fashion. An Esmarch bandage was used to   exsanguinate the left lower extremity and the pneumatic tourniquet was inflated to 300 mmHg. A longitudinal incision was made beginning at the mid pole of the medial aspect of the patella extending down to below the joint line, cut down to the dermal layer, opened the extensor retinaculum medially. Took down the soft tissues off the proximal medial tibia, took down the anterior horn of the medial meniscus,   took down the osteophytes off the medial femoral condyle. Got a good look at the notch. The ACL was intact. The lateral compartment looked good. We went ahead and   used an extramedullary alignment guide to make a proximal tibial resection   perpendicular to the mechanical axis, resecting about 7 mm of bone off the   proximal tibia, sized it up to a size d. We then checked our flexion gap which  was 7. We then used our extramedullary alignment guide to make  our  holes for a distal femoral cutting block, size Medium. We went ahead and made our   posterior femoral cut. Flexion gap was set at 3 and extension gap was at 0. We then milled the distal femur with 3 spigots to balance our flexion and extension gaps symmetrically at 3. We then went ahead and prepared for our tibial keel, size d and trial components were then placed. Had good range of motion and no impingement in full extension. We went ahead and Pulsavac irrigated the cancellous bone, vacuum mixed antibiotic-impregnated cement and cemented our   tibial component size d, removed excess cement and cemented our femoral   component size Medium, removed excess cement and then placed our 3 mobile bearing polyethylene spacer.  Closed the retinaculum with 1 Vicryl   figure-of-eight sutures with the knee in slight flexion, subcutaneous tissues   were closed with 2-0 Vicryl, subcuticular layer closed with a running 3-0   Prolene. Sterile dressing was applied. Tourniquet was deflated.

## 2019-09-10 NOTE — TRANSFER OF CARE
"Anesthesia Transfer of Care Note    Patient: Edy Bartlett III    Procedure(s) Performed: Procedure(s) (LRB):  ARTHROPLASTY, KNEE, UNICOMPARTMENTAL (Left)    Patient location: PACU    Anesthesia Type: general    Transport from OR: Transported from OR on room air with adequate spontaneous ventilation    Post pain: adequate analgesia    Post assessment: no apparent anesthetic complications    Post vital signs: stable    Level of consciousness: awake and sedated    Nausea/Vomiting: no nausea/vomiting    Complications: none    Transfer of care protocol was followed      Last vitals:   Visit Vitals  BP (!) 196/97 (BP Location: Left arm, Patient Position: Lying)   Pulse 68   Temp 36.3 °C (97.3 °F) (Skin)   Resp 12   Ht 5' 9" (1.753 m)   Wt 83.9 kg (185 lb)   SpO2 100%   BMI 27.32 kg/m²     "

## 2019-09-10 NOTE — ANESTHESIA PREPROCEDURE EVALUATION
09/10/2019  Edy Bartlett III is a 64 y.o., male.    Anesthesia Evaluation    I have reviewed the Patient Summary Reports.    I have reviewed the Nursing Notes.   I have reviewed the Medications.     Review of Systems  Anesthesia Hx:  No problems with previous Anesthesia Hx of Anesthetic complications    Social:  Non-Smoker Smoking Status: Never Smoker  Smokeless Tobacco Status: Never Used  Alcohol use: No  Drug use: No       Cardiovascular:   Hypertension Denies MI.  Denies CAD.    Denies CABG/stent.   Denies Angina.    Pulmonary:   Denies COPD.  Denies Asthma.  Denies Recent URI.    Renal/:   Denies Chronic Renal Disease.     Hepatic/GI:   Denies GERD. Denies Liver Disease.    Musculoskeletal:   Arthritis     Neurological:   Denies TIA. Denies CVA. Denies Seizures.    Endocrine:   Denies Diabetes. Denies Hypothyroidism.    Psych:   Denies Psychiatric History.          Physical Exam  General:  Well nourished    Airway/Jaw/Neck:  Airway Findings: Mouth Opening: Normal Tongue: Normal  General Airway Assessment: Adult, Good  Mallampati: II  Improves to II with phonation.  TM Distance: 4-6 cm      Dental:  Dental Findings: In tact   Chest/Lungs:  Chest/Lungs Findings: Clear to auscultation, Normal Respiratory Rate     Heart/Vascular:  Heart Findings: Rate: Normal  Rhythm: Regular Rhythm  Sounds: Normal  Heart murmur: negative       Mental Status:  Mental Status Findings:  Cooperative, Alert and Oriented         Anesthesia Plan  Type of Anesthesia, risks & benefits discussed:  Anesthesia Type:  spinal, regional  Patient's Preference:   Intra-op Monitoring Plan: standard ASA monitors  Intra-op Monitoring Plan Comments:   Post Op Pain Control Plan:   Post Op Pain Control Plan Comments:   Induction:   IV  Beta Blocker:  Patient is not currently on a Beta-Blocker (No further documentation required).       Informed  Consent: Patient understands risks and agrees with Anesthesia plan.  Questions answered. Anesthesia consent signed with patient.  ASA Score: 3     Day of Surgery Review of History & Physical: I have interviewed and examined the patient. I have reviewed the patient's H&P dated:  There are no significant changes.  H&P update referred to the surgeon.         Ready For Surgery From Anesthesia Perspective.

## 2019-09-10 NOTE — PLAN OF CARE
[facility] [place] [facility]  Physical Therapy Initial Evaluation    Name: Edy Bartlett [facility]  Clinic Number: [id number]    Therapy Diagnosis:   Encounter Diagnoses   Name Primary?    Gait instability     Weakness of left lower extremity      Physician: Nelson Silva M.D    Physician Orders: PT Eval and Treat: Patient will need a one time visit on 9/10/19 in the surgery center, having left unicompartmental knee replacement.  Patient will be discharged with Home Health  Medical Diagnosis from Referral: S/P left unicompartmental knee replacement; Post-op pain  Evaluation Date: 9/10/2019  Authorization Period Expiration: 12/31/2019  Plan of Care Expiration: 09/10/2019  Visit # / Visits authorized: 1/ 30    Time In: 1405  Time Out: 1433    Total Billable Time: 28 minutes    Precautions: Standard    Subjective   Date of onset: s/o ARTHROPLASTY, KNEE, UNICOMPARTMENTAL (Left) on 9/10/19  History of current condition - Edy reports: He had surgery this morning on his left knee and he is currently not in any pain due to the anesthesia. He reports diminished sensation in both feet. Edy reports a history of a broken R ankle with decreased R ankle ROM. He states he received RW training prior to surgery.     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    Complication of anesthesia     gets very sore neck post-op if his head is twisted or bent too far with intubation    DDD (degenerative disc disease), cervical     DDD (degenerative disc disease), lumbar     DJD (degenerative joint disease) of knee 12/10/2012    ED (erectile dysfunction)     Elevated PSA     Hyperlipidemia     Hypertension     Mass of skin 2012    left temple, possible lipoma    Mobility impaired     cane    Neuropathy     feet    OA (osteoarthritis of spine)     Respiratory distress     has had neck fusion    Tattoo Dec 2012     left upper arm     Edy Bartlett III  has a past surgical history that includes Cervical spine surgery; Carpal tunnel release; hematoma removed (2006); Fracture surgery; Appendectomy; Hernia repair; Trigger finger release (2007); Epidural block injection; Tendon repair; Knee arthroscopy; Prostate biopsy; Tumor excision; and urolift.    Edy has a current medication list which includes the following prescription(s): cephalexin, cyclobenzaprine, hydrocodone-acetaminophen, lisinopril, oxycodone-acetaminophen, and warfarin, and the following Facility-Administered Medications: sodium chloride 0.9%, bacitracin, diphenhydramine, fentanyl, fentanyl, hydromorphone (pf), meperidine, midazolam, oxycodone, promethazine (PHENERGAN) 6.25 mg in dextrose 5 % 50 mL IVPB, and sodium chloride 0.9%.    Review of patient's allergies indicates:   Allergen Reactions    Statins-hmg-coa reductase inhibitors Other (See Comments)        Imaging, X-ray: Interval postoperative changes of left knee medial hemiarthroplasty noted.  No acute hardware complication detected.  No acute fracture or dislocation.  Expected postoperative air within the knee joint.  Lateral tibiofemoral joint space is well maintained.    Prior Therapy: Received RW training prior to surgery at [facility]   Social History: Lives with his wife that works during the day. His grandson lives next door who works at night and is available during the day to help him out. He reports he will have assistance if he needs help from family.   Occupation: Semi-retired  for [employer]   Prior Level of Function: Had increased pain with all ADLs, needed a SPC for ambulation due to knee pain  Current Level of Function: Limited with all standing, ADLs, and requires RW for ambulation, cues for safety     Pain:  Current 0/10  Location: left knee   Description: Currently no pain due to anesthesia     Pts goals: To go home    Objective     Patient found HOB elevated with ice pack on L knee  with a peripheral IV    Cognitive Exam:  Oriented to: Person, Place, Time and Situation    Physical Exam:  Sensation: Diminished due to post surgery anesthesia     Lower Extremity Range of Motion:  Right Lower Extremity: WFL at hip and knee, decreased ankle DF/PF  Left Lower Extremity: not formally assessed due to post surgical dressing    Lower Extremity Strength:  Right Lower Extremity: 4/5 hip flexion, knee extension, significantly limited at ankle DF/PF  Left Lower Extremity: At least 3+/5 at hip flexors and knee extensors, ankle DF/PF. Not formally assessed.      Functional Mobility:  Bed Mobility:  Supine to Sit: Supervision  Sit to supine: Supervision    Transfers:  Sit to stand: RW with CGA. Verbal cues for sequencing to push up from the bed  Bed to chair: Stand pivot with RW with CGA verbal cues for sequencing with RW and to back up to chair prior to sitting    Gait:   Ambulated ~100 ft with RW intermittent min A/CGA. Verbal cues to remain in frame of RW and improve step width. Demo narrow JATINDER, decreased LE clearance, antalgic gait, decreased step length, decreased renita.     Balance: Good static sitting balance EOB. Can maintain with Supervision    Home Exercises and Patient Education Provided    Education provided:   - Safety with RW and mobility at home      Assessment   Edy is a 64 y.o. male referred to outpatient Physical Therapy with a medical diagnosis of S/P left unicompartmental knee replacement; Post-op pain. Pt presents with gait instability, decreased knee ROM, decreased balance, impaired functional mobility, pain, and decreased safety awareness. Ambulated initially with Min A with tendency to walk too anterior in the frame of the RW with narrow JATINDER and decreased LE clearance. Gait improved with cueing and he ambulated final 50 ft with CGA with no episodes of instability. Provided cueing for safety with transfers and pt demo understanding. Educated family to remain close to patient during  ambulation due to his LE weakness and gait instability and they verbalized understanding. He is safe to discharge home with family and home health PT.     Pt prognosis is Good.   Pt will benefit from skilled outpatient Physical Therapy to address the deficits stated above and in the chart below, provide pt/family education, and to maximize pt's level of independence.     Plan of care discussed with patient: Yes  Pt's spiritual, cultural and educational needs considered and patient is agreeable to the plan of care and goals as stated below:     Anticipated Barriers for therapy:      Medical Necessity is demonstrated by the following  History  Co-morbidities and personal factors that may impact the plan of care Co-morbidities:   Neuropathy, DDD    Personal Factors:   attitudes     moderate   Examination  Body Structures and Functions, activity limitations and participation restrictions that may impact the plan of care Body Regions:   lower extremities    Body Systems:    ROM  strength  gross coordinated movement  balance  gait    Participation Restrictions:   Difficulty with standing and ambulating s/o knee surgery     Activity limitations:   Learning and applying knowledge  no deficits    General Tasks and Commands  no deficits    Communication  no deficits    Mobility  lifting and carrying objects  walking    Self care  dressing    Domestic Life  doing house work (cleaning house, washing dishes, laundry)    Interactions/Relationships  no deficits    Life Areas  no deficits    Community and Social Life  recreation and leisure         moderate   Clinical Presentation stable and uncomplicated moderate   Decision Making/ Complexity Score: moderate       Plan     Discharge Home with family with Home Health Physical Therapy     Kameron Kang, PT

## 2019-09-10 NOTE — BRIEF OP NOTE
[facility]  Brief Operative Note     SUMMARY     Surgery Date: 9/10/2019     Surgeon(s) and Role:     * Nelson Silva MD - Primary    Assistant: Fermin Alcocer RN [facility]    Pre-op Diagnosis:  Chronic pain of left knee [M25.562, G89.29]  Primary osteoarthritis of left knee [M17.12]    Post-op Diagnosis:  Chronic pain of left knee [M25.562, G89.29]  Primary osteoarthritis of left knee [M17.12]    Procedure(s) (LRB):  ARTHROPLASTY, KNEE, UNICOMPARTMENTAL (Left)      Description of the findings of the procedure:  Chronic pain of left knee [M25.562, G89.29]  Primary osteoarthritis of left knee [M17.12]      Estimated Blood Loss: < 20CC         Specimens:   Specimen (12h ago, onward)    None          Discharge Note    SUMMARY     Admit Date: 9/10/2019    Discharge Date and Time: No discharge date for patient encounter.    Attending Physician: Nelson Silva MD     Discharge Provider: Nelson Silva    Final Diagnosis :Chronic pain of left knee [M25.562, G89.29]  Primary osteoarthritis of left knee [M17.12]    Outcome of Hospitalization, Treatment, Procedure, or Surgery:  Patient was admitted for an outpatient procedure and tolerated it well without complications.    Disposition: Home or Self care    Follow Up/Patient Instructions: The patient will be discharged home after meeting all discharge criteria. The patient will resume a diet as tolerated. Please follow post-operative instructions for activity restrictions. Keep previously planned post-operative follow -up appointment.      No discharge procedures on file.

## 2019-09-10 NOTE — ANESTHESIA PROCEDURE NOTES
Spinal    Diagnosis: DJD LEFT KNEE, TKA LEFT KNEE  Patient location during procedure: OR  Start time: 9/10/2019 9:55 AM  Timeout: 9/10/2019 9:55 AM  End time: 9/10/2019 10:01 AM    Staffing  Authorizing Provider: Dennis Wise MD  Performing Provider: Dennis Wise MD    Preanesthetic Checklist  Completed: patient identified, site marked, surgical consent, pre-op evaluation, timeout performed, IV checked, risks and benefits discussed and monitors and equipment checked  Spinal Block  Patient position: sitting  Prep: Betadine  Patient monitoring: heart rate, cardiac monitor, continuous pulse ox, continuous capnometry and frequent blood pressure checks  Approach: midline  Location: L3-4  Injection technique: single shot  CSF Fluid: clear free-flowing CSF  Needle  Needle type: pencil-tip   Needle gauge: 25 G  Needle length: 3.5 in  Additional Documentation: incremental injection, negative aspiration for heme and no paresthesia on injection  Needle localization: anatomical landmarks  Assessment  Sensory level: T10   Dermatomal levels determined by alcohol wipe  Ease of block: moderate  Patient's tolerance of the procedure: comfortable throughout block and no complaints

## 2019-09-10 NOTE — ANESTHESIA POSTPROCEDURE EVALUATION
Anesthesia Post Evaluation    Patient: Edy Bartlett III    Procedure(s) Performed: Procedure(s) (LRB):  ARTHROPLASTY, KNEE, UNICOMPARTMENTAL (Left)    Final Anesthesia Type: spinal  Patient location during evaluation: PACU  Patient participation: Yes- Able to Participate  Level of consciousness: awake and alert and oriented  Post-procedure vital signs: reviewed and stable  Pain management: adequate  Airway patency: patent  PONV status at discharge: No PONV  Anesthetic complications: no      Cardiovascular status: blood pressure returned to baseline  Respiratory status: unassisted, spontaneous ventilation and room air  Hydration status: euvolemic  Follow-up not needed.          Vitals Value Taken Time   /78 9/10/2019 12:20 PM   Temp 36.2 °C (97.2 °F) 9/10/2019 11:20 AM   Pulse 58 9/10/2019 12:20 PM   Resp 16 9/10/2019 12:20 PM   SpO2 95 % 9/10/2019 12:20 PM         No case tracking events are documented in the log.      Pain/Ciara Score: Pain Rating Prior to Med Admin: 4 (9/10/2019  8:42 AM)  Ciara Score: 9 (9/10/2019 12:20 PM)

## 2019-09-11 VITALS
OXYGEN SATURATION: 96 % | HEIGHT: 69 IN | WEIGHT: 185 LBS | TEMPERATURE: 97 F | SYSTOLIC BLOOD PRESSURE: 136 MMHG | RESPIRATION RATE: 16 BRPM | DIASTOLIC BLOOD PRESSURE: 70 MMHG | HEART RATE: 62 BPM | BODY MASS INDEX: 27.4 KG/M2

## 2019-09-14 DIAGNOSIS — Z12.11 COLON CANCER SCREENING: ICD-10-CM

## 2019-09-16 ENCOUNTER — PATIENT OUTREACH (OUTPATIENT)
Dept: ADMINISTRATIVE | Facility: HOSPITAL | Age: 64
End: 2019-09-16

## 2019-09-24 ENCOUNTER — TELEPHONE (OUTPATIENT)
Dept: ADMINISTRATIVE | Facility: HOSPITAL | Age: 64
End: 2019-09-24

## 2019-09-25 ENCOUNTER — OFFICE VISIT (OUTPATIENT)
Dept: ORTHOPEDICS | Facility: CLINIC | Age: 64
End: 2019-09-25
Payer: COMMERCIAL

## 2019-09-25 VITALS — HEIGHT: 69 IN | BODY MASS INDEX: 26.81 KG/M2 | WEIGHT: 181 LBS

## 2019-09-25 DIAGNOSIS — Z96.652 S/P LEFT UNICOMPARTMENTAL KNEE REPLACEMENT: ICD-10-CM

## 2019-09-25 DIAGNOSIS — G89.18 POST-OP PAIN: ICD-10-CM

## 2019-09-25 DIAGNOSIS — Z96.652 S/P LEFT UNICOMPARTMENTAL KNEE REPLACEMENT: Primary | ICD-10-CM

## 2019-09-25 PROCEDURE — 99024 POSTOP FOLLOW-UP VISIT: CPT | Mod: S$GLB,,, | Performed by: ORTHOPAEDIC SURGERY

## 2019-09-25 PROCEDURE — 99999 PR PBB SHADOW E&M-EST. PATIENT-LVL III: ICD-10-PCS | Mod: PBBFAC,,, | Performed by: ORTHOPAEDIC SURGERY

## 2019-09-25 PROCEDURE — 99024 PR POST-OP FOLLOW-UP VISIT: ICD-10-PCS | Mod: S$GLB,,, | Performed by: ORTHOPAEDIC SURGERY

## 2019-09-25 PROCEDURE — 99999 PR PBB SHADOW E&M-EST. PATIENT-LVL III: CPT | Mod: PBBFAC,,, | Performed by: ORTHOPAEDIC SURGERY

## 2019-09-25 RX ORDER — OXYCODONE AND ACETAMINOPHEN 5; 325 MG/1; MG/1
1 TABLET ORAL EVERY 4 HOURS PRN
Qty: 37 TABLET | Refills: 0 | Status: SHIPPED | OUTPATIENT
Start: 2019-09-25 | End: 2019-10-29

## 2019-09-28 DIAGNOSIS — Z12.11 COLON CANCER SCREENING: ICD-10-CM

## 2019-10-04 DIAGNOSIS — Z12.11 COLON CANCER SCREENING: ICD-10-CM

## 2019-10-14 DIAGNOSIS — Z12.11 COLON CANCER SCREENING: ICD-10-CM

## 2019-10-18 DIAGNOSIS — Z12.11 COLON CANCER SCREENING: ICD-10-CM

## 2019-10-21 DIAGNOSIS — G89.18 POST-OP PAIN: ICD-10-CM

## 2019-10-21 DIAGNOSIS — Z96.652 S/P LEFT UNICOMPARTMENTAL KNEE REPLACEMENT: Primary | ICD-10-CM

## 2019-10-22 ENCOUNTER — TELEPHONE (OUTPATIENT)
Dept: ORTHOPEDICS | Facility: CLINIC | Age: 64
End: 2019-10-22

## 2019-10-22 NOTE — TELEPHONE ENCOUNTER
Spoke to patient, he states someone from [facility] called in regards to paying $200 for scheduled xray tomorrow. Informed patient, I do not handle finacial issues and gave patient financial counselors number. understanding and thanks verbalized ----- Message from Brenna Dobbs sent at 10/22/2019  4:11 PM CDT -----  Contact: Pt  Pt is requesting call back in regards to x ray appt on tomorrow.          Pls call back at [phone]

## 2019-10-23 ENCOUNTER — OFFICE VISIT (OUTPATIENT)
Dept: ORTHOPEDICS | Facility: CLINIC | Age: 64
End: 2019-10-23
Payer: COMMERCIAL

## 2019-10-23 ENCOUNTER — HOSPITAL ENCOUNTER (OUTPATIENT)
Dept: RADIOLOGY | Facility: HOSPITAL | Age: 64
Discharge: HOME | End: 2019-10-23
Attending: ORTHOPAEDIC SURGERY
Payer: COMMERCIAL

## 2019-10-23 VITALS — WEIGHT: 181 LBS | BODY MASS INDEX: 26.81 KG/M2 | HEIGHT: 69 IN

## 2019-10-23 DIAGNOSIS — G89.18 POST-OP PAIN: ICD-10-CM

## 2019-10-23 DIAGNOSIS — Z96.652 S/P LEFT UNICOMPARTMENTAL KNEE REPLACEMENT: Primary | ICD-10-CM

## 2019-10-23 DIAGNOSIS — Z96.652 S/P LEFT UNICOMPARTMENTAL KNEE REPLACEMENT: ICD-10-CM

## 2019-10-23 PROCEDURE — 73562 X-RAY EXAM OF KNEE 3: CPT | Mod: 26,LT,, | Performed by: RADIOLOGY

## 2019-10-23 PROCEDURE — 73560 X-RAY EXAM OF KNEE 1 OR 2: CPT | Mod: TC,PO,RT

## 2019-10-23 PROCEDURE — 73560 X-RAY EXAM OF KNEE 1 OR 2: CPT | Mod: 26,59,RT, | Performed by: RADIOLOGY

## 2019-10-23 PROCEDURE — 99999 PR PBB SHADOW E&M-EST. PATIENT-LVL II: CPT | Mod: PBBFAC,,, | Performed by: ORTHOPAEDIC SURGERY

## 2019-10-23 PROCEDURE — 99999 PR PBB SHADOW E&M-EST. PATIENT-LVL II: ICD-10-PCS | Mod: PBBFAC,,, | Performed by: ORTHOPAEDIC SURGERY

## 2019-10-23 PROCEDURE — 73562 X-RAY EXAM OF KNEE 3: CPT | Mod: TC,PO,LT

## 2019-10-23 PROCEDURE — 99024 PR POST-OP FOLLOW-UP VISIT: ICD-10-PCS | Mod: S$GLB,,, | Performed by: ORTHOPAEDIC SURGERY

## 2019-10-23 PROCEDURE — 73562 XR KNEE ORTHO LEFT: ICD-10-PCS | Mod: 26,LT,, | Performed by: RADIOLOGY

## 2019-10-23 PROCEDURE — 73560 XR KNEE ORTHO LEFT: ICD-10-PCS | Mod: 26,59,RT, | Performed by: RADIOLOGY

## 2019-10-23 PROCEDURE — 99024 POSTOP FOLLOW-UP VISIT: CPT | Mod: S$GLB,,, | Performed by: ORTHOPAEDIC SURGERY

## 2019-10-23 NOTE — PROGRESS NOTES
2019     Leticia [place] (:  1961) is a 63 y.o. female, here for evaluation of the following medical concerns:    HPI  Pt presents to the clinic to discuss her elevated blood pressures. Pt has checked her BP at [facility] and then at the clinic as well. Her BP has ranged between 140-160's/ 80-90's. She is asymptomatic. Her blood pressure is WNL today with 2 checks. She denies chest pain or shortness of breath. Pt does not have any swelling in her lower legs. We had reduced her blood pressures medication a few months ago due to hypotension episodes. Past Medical History:   Diagnosis Date    Cervicogenic headache     Chronic sinusitis     Conductive hearing loss     Degenerative arthritis of cervical spine     Depression     Elevated fasting glucose     Epigastric abdominal pain     GERD (gastroesophageal reflux disease)     History of cervical dysplasia     LASER    History of methamphetamine abuse     (Quit )    LSIL (low grade squamous intraepithelial lesion) on Pap smear     Cryo    Tobacco abuse        Past Surgical History:   Procedure Laterality Date    CATARACT REMOVAL Bilateral     CATARACT REMOVAL WITH IMPLANT Left 3/22/2016    Dr. Al Rosario, [address] COLONOSCOPY  2017    Dr. Jesusita Kebede, [facility]  03/10/1987    Laser conization of cervix (Hx of moderate dysplasia on Pap test).  UPPER GASTROINTESTINAL ENDOSCOPY  2013    Mild gastritis.        Social History     Socioeconomic History    Marital status:      Spouse name: None    Number of children: None    Years of education: None    Highest education level: None   Occupational History    None   Social Needs    Financial resource strain: None    Food insecurity:     Worry: None     Inability: None    Transportation needs:     Medical: None     Non-medical: None   Tobacco Use    Smoking status: Six weeks post arthroplasty.  Doing well.  No signs of infection.  Good range of motion and stability.  X - rays look good.  Plan: Activities to tolerance, OK to discontinue dvt prophylaxis from orthopedic standpoint.  Follow up in six weeks with x-rays.   (PROZAC) 20 MG capsule Take 60 mg by mouth daily      Exenatide (BYDUREON) 2 MG PEN Inject 1 pen into the skin once a week 12 pen 3    lisinopril (PRINIVIL;ZESTRIL) 40 MG tablet Take 1 tablet by mouth daily 90 tablet 1    atorvastatin (LIPITOR) 10 MG tablet TAKE 1 TABLET DAILY 90 tablet 1    Blood Pressure Monitor CARY Monitor BP daily; Dx. Hypertension (I10) 1 Device 0    omeprazole (PRILOSEC) 20 MG delayed release capsule Take 1 capsule by mouth daily 90 capsule 1    ondansetron (ZOFRAN ODT) 4 MG disintegrating tablet Take 1 tablet by mouth every 8 hours as needed for Nausea 20 tablet 0    Insulin Pen Needle ([employer] PEN NEEDLES) 31G X 6 MM MISC 1 each by Does not apply route daily 100 each 3    Glucose Blood (BLOOD GLUCOSE TEST STRIPS) STRP Diagnosis 250.90, tests once daily 50 strip 11    Lancets MISC Diagnosis 250.90, tests once daily 50 each 11    lamoTRIgine (LAMICTAL) 200 MG tablet Take 200 mg by mouth daily      Blood Glucose Monitoring Suppl CARY Diagnosis 250.90, tests once daily 1 Device 0    Cholecalciferol (VITAMIN D PO) Take by mouth      SAPHRIS 10 MG SUBL sublingual tablet nightly       Multiple Vitamins-Minerals (MULTIVITAMIN PO) Take  by mouth daily. No current facility-administered medications for this visit. Review of Systems   Constitutional: Negative for activity change, appetite change and fever. Respiratory: Negative for cough, shortness of breath and wheezing. Cardiovascular: Negative for chest pain, palpitations and leg swelling. Neurological: Negative for dizziness, light-headedness and headaches. Prior to Visit Medications    Medication Sig Taking?  Authorizing Provider   Blood Pressure KIT Check blood pressure daily and as needed E11.9 (hypertension) Yes SAEED Castellanos - CNP   buPROPion (WELLBUTRIN SR) 150 MG extended release tablet Take 1 tablet by mouth 2 times daily  SAEED Castellanos CNP   OLANZapine (August Pope) 10 MG tablet   Historical Provider, MD   FLUoxetine (PROZAC) 20 MG capsule Take 60 mg by mouth daily  Historical Provider, MD   Exenatide (BYDUREON) 2 MG PEN Inject 1 pen into the skin once a week  SAEED Suarez CNP   lisinopril (PRINIVIL;ZESTRIL) 40 MG tablet Take 1 tablet by mouth daily  SAEED Suarez CNP   atorvastatin (LIPITOR) 10 MG tablet TAKE 1 TABLET DAILY  SAEED Suarez CNP   Blood Pressure Monitor CARY Monitor BP daily; Dx. Hypertension (I10)  Alexia Ahumada, MD   omeprazole (PRILOSEC) 20 MG delayed release capsule Take 1 capsule by mouth daily  SAEED Suarez CNP   ondansetron (ZOFRAN ODT) 4 MG disintegrating tablet Take 1 tablet by mouth every 8 hours as needed for Nausea  Cinthia Leo MD   Insulin Pen Needle (Clyda Husk PEN NEEDLES) 31G X 6 MM MISC 1 each by Does not apply route daily  Norailine SAEED Brannon CNP   Glucose Blood (BLOOD GLUCOSE TEST STRIPS) STRP Diagnosis 250.90, tests once daily  Gibranquiline ClovisSAEED rey - CNP   Lancets MISC Diagnosis 250.90, tests once daily  Gibranquiline ClovisSAEED rey - CNP   lamoTRIgine (LAMICTAL) 200 MG tablet Take 200 mg by mouth daily  Historical Provider, MD   Blood Glucose Monitoring Suppl CARY Diagnosis 250.90, tests once daily  Gibranquiline SAEED Brannon - CNP   Cholecalciferol (VITAMIN D PO) Take by mouth  Historical Provider, MD   SAPHRIS 10 MG SUBL sublingual tablet nightly   Historical Provider, MD   Multiple Vitamins-Minerals (MULTIVITAMIN PO) Take  by mouth daily. Historical Provider, MD        Social History     Tobacco Use    Smoking status: Former Smoker     Packs/day: 0.25     Years: 40.00     Pack years: 10.00     Last attempt to quit: 2019     Years since quittin.1    Smokeless tobacco: Never Used   Substance Use Topics    Alcohol use:  Yes     Alcohol/week: 0.0 oz     Comment: socially        Vitals:    19 1104 19 1120   BP: 122/84 130/78   Site: Right Upper Arm    Position: Sitting    Cuff Size: Medium Adult    Pulse: 88    SpO2: 98%    Weight: 161 lb (73 kg)    Height: 5' 6\" (1.676 m)      Estimated body mass index is 25.99 kg/m² as calculated from the following:    Height as of this encounter: 5' 6\" (1.676 m). Weight as of this encounter: 161 lb (73 kg). Physical Exam   Constitutional: She is oriented to person, place, and time. She appears well-developed and well-nourished. No distress. HENT:   Head: Normocephalic and atraumatic. Neck: Neck supple. Cardiovascular: Normal rate, regular rhythm and normal heart sounds. Pulmonary/Chest: Effort normal and breath sounds normal. No stridor. No respiratory distress. She has no wheezes. Neurological: She is alert and oriented to person, place, and time. Nursing note and vitals reviewed. ASSESSMENT/PLAN:  1. Essential hypertension  Blood pressures today in office are WNL  Continue current medications  Pt is to keep BP log   She is to keep her appt in July and will review log  - Blood Pressure KIT; Check blood pressure daily and as needed E11.9 (hypertension)  Dispense: 1 kit; Refill: 0      No follow-ups on file. An electronic signature was used to authenticate this note.     --SAEED Morgan - CNP on 6/24/2019 at 11:16 PM

## 2019-10-29 ENCOUNTER — OFFICE VISIT (OUTPATIENT)
Dept: FAMILY MEDICINE | Facility: CLINIC | Age: 64
End: 2019-10-29
Payer: COMMERCIAL

## 2019-10-29 VITALS
WEIGHT: 179.25 LBS | TEMPERATURE: 98 F | DIASTOLIC BLOOD PRESSURE: 66 MMHG | BODY MASS INDEX: 26.55 KG/M2 | OXYGEN SATURATION: 98 % | HEART RATE: 90 BPM | HEIGHT: 69 IN | SYSTOLIC BLOOD PRESSURE: 98 MMHG

## 2019-10-29 DIAGNOSIS — L98.9 SKIN LESION: ICD-10-CM

## 2019-10-29 DIAGNOSIS — I10 ESSENTIAL HYPERTENSION: ICD-10-CM

## 2019-10-29 DIAGNOSIS — J01.90 ACUTE BACTERIAL SINUSITIS: Primary | ICD-10-CM

## 2019-10-29 DIAGNOSIS — H90.3 BILATERAL SENSORINEURAL HEARING LOSS: ICD-10-CM

## 2019-10-29 DIAGNOSIS — B96.89 ACUTE BACTERIAL SINUSITIS: Primary | ICD-10-CM

## 2019-10-29 DIAGNOSIS — Z12.11 SCREENING FOR COLON CANCER: ICD-10-CM

## 2019-10-29 PROBLEM — Z79.01 PROPHYLACTIC USE OF WARFARIN FOR VENOUS THROMBOEMBOLISM: Status: RESOLVED | Noted: 2019-09-03 | Resolved: 2019-10-29

## 2019-10-29 PROBLEM — M17.12 LEFT KNEE DJD: Status: RESOLVED | Noted: 2019-08-26 | Resolved: 2019-10-29

## 2019-10-29 PROCEDURE — 99214 PR OFFICE/OUTPT VISIT, EST, LEVL IV, 30-39 MIN: ICD-10-PCS | Mod: S$GLB,,, | Performed by: INTERNAL MEDICINE

## 2019-10-29 PROCEDURE — 99999 PR PBB SHADOW E&M-EST. PATIENT-LVL III: CPT | Mod: PBBFAC,,, | Performed by: INTERNAL MEDICINE

## 2019-10-29 PROCEDURE — 99214 OFFICE O/P EST MOD 30 MIN: CPT | Mod: S$GLB,,, | Performed by: INTERNAL MEDICINE

## 2019-10-29 PROCEDURE — 99999 PR PBB SHADOW E&M-EST. PATIENT-LVL III: ICD-10-PCS | Mod: PBBFAC,,, | Performed by: INTERNAL MEDICINE

## 2019-10-29 RX ORDER — LISINOPRIL 10 MG/1
10 TABLET ORAL DAILY
Qty: 90 TABLET | Refills: 3 | Status: SHIPPED | OUTPATIENT
Start: 2019-10-29 | End: 2020-03-27 | Stop reason: SDUPTHER

## 2019-10-29 RX ORDER — AMOXICILLIN AND CLAVULANATE POTASSIUM 875; 125 MG/1; MG/1
1 TABLET, FILM COATED ORAL EVERY 12 HOURS
Qty: 14 TABLET | Refills: 0 | Status: SHIPPED | OUTPATIENT
Start: 2019-10-29 | End: 2020-03-03

## 2019-10-29 NOTE — PROGRESS NOTES
"  Subjective     Edy Bartlett III is a 64 y.o. old, male here for Establish Care and Trouble eating    Patient is here for follow-up on chronic medical problems and with acute complaints of 2 weeks of sinus drainage and difficulty eating.  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  He has not had any fever or lower respiratory tract symptoms.  He has had clear nasal discharge that is particularly worse when eating or drinking.  He has no history of allergies.  Symptoms have not improved.  He has lost weight due to inability eat and food not tasting is good.  He has lost some of his sense of taste and smell.    HTN: Currently taking lisinopril 20 mg. Asymptomatic.    He has several skin lesions, would like to see a dermatologist. Some of the lesions are new on his face, some on his arm have been present for years.    He has chronic hearing loss, will go back to see his ENT/audiologist.    ROS   As above    Medications     Outpatient Medications Marked as Taking for the 10/29/19 encounter (Office Visit) with Reddy Vidal MD   Medication Sig Dispense Refill    lisinopril (PRINIVIL,ZESTRIL) 20 MG tablet Take 20 mg by mouth once daily.  2     Objective     BP 98/66   Pulse 90   Temp 97.6 °F (36.4 °C) (Temporal)   Ht 5' 9" (1.753 m)   Wt 81.3 kg (179 lb 3.7 oz)   SpO2 98%   BMI 26.47 kg/m²   Physical Exam   Constitutional: He appears well-developed. No distress.   HENT:   Head: Normocephalic and atraumatic.   Right Ear: External ear normal.   Left Ear: External ear normal.   Nose: Rhinorrhea present.   Mouth/Throat: Oropharynx is clear and moist.   Cerumen bilaterally   Eyes: Conjunctivae are normal. Right eye exhibits no discharge. Left eye exhibits no discharge.   Neck: Neck supple.   Cardiovascular: Normal rate and regular rhythm.   No murmur heard.  Pulmonary/Chest: Effort normal and breath sounds normal. No respiratory distress.   Lymphadenopathy:     He has no " cervical adenopathy.     Assessment and Plan     1. Acute bacterial sinusitis  - amoxicillin-clavulanate 875-125mg (AUGMENTIN) 875-125 mg per tablet; Take 1 tablet by mouth every 12 (twelve) hours.  Dispense: 14 tablet; Refill: 0    2. Bilateral sensorineural hearing loss  Return to ENT    3. Skin lesion  - Ambulatory referral to Dermatology    4. Essential hypertension  Reduce dose  - lisinopril 10 MG tablet; Take 1 tablet (10 mg total) by mouth once daily.  Dispense: 90 tablet; Refill: 3    5. Screening for colon cancer  - Case request GI: COLONOSCOPY    ___________________  Reddy Vidal MD  Internal Medicine and Pediatrics

## 2019-11-22 DIAGNOSIS — Z96.652 S/P LEFT UNICOMPARTMENTAL KNEE REPLACEMENT: Primary | ICD-10-CM

## 2019-11-22 DIAGNOSIS — G89.18 POST-OP PAIN: ICD-10-CM

## 2019-11-25 ENCOUNTER — OFFICE VISIT (OUTPATIENT)
Dept: ORTHOPEDICS | Facility: CLINIC | Age: 64
End: 2019-11-25
Payer: COMMERCIAL

## 2019-11-25 ENCOUNTER — HOSPITAL ENCOUNTER (OUTPATIENT)
Dept: RADIOLOGY | Facility: HOSPITAL | Age: 64
Discharge: HOME | End: 2019-11-25
Attending: ORTHOPAEDIC SURGERY
Payer: COMMERCIAL

## 2019-11-25 VITALS — WEIGHT: 179.25 LBS | HEIGHT: 69 IN | BODY MASS INDEX: 26.55 KG/M2

## 2019-11-25 DIAGNOSIS — Z96.652 S/P LEFT UNICOMPARTMENTAL KNEE REPLACEMENT: ICD-10-CM

## 2019-11-25 DIAGNOSIS — G89.18 POST-OP PAIN: Primary | ICD-10-CM

## 2019-11-25 DIAGNOSIS — G89.18 POST-OP PAIN: ICD-10-CM

## 2019-11-25 PROCEDURE — 73560 XR KNEE ORTHO LEFT: ICD-10-PCS | Mod: 26,59,RT, | Performed by: RADIOLOGY

## 2019-11-25 PROCEDURE — 99024 PR POST-OP FOLLOW-UP VISIT: ICD-10-PCS | Mod: S$GLB,,, | Performed by: ORTHOPAEDIC SURGERY

## 2019-11-25 PROCEDURE — 73562 X-RAY EXAM OF KNEE 3: CPT | Mod: TC,PO,LT

## 2019-11-25 PROCEDURE — 73560 X-RAY EXAM OF KNEE 1 OR 2: CPT | Mod: TC,PO,RT

## 2019-11-25 PROCEDURE — 99999 PR PBB SHADOW E&M-EST. PATIENT-LVL II: CPT | Mod: PBBFAC,,, | Performed by: ORTHOPAEDIC SURGERY

## 2019-11-25 PROCEDURE — 99024 POSTOP FOLLOW-UP VISIT: CPT | Mod: S$GLB,,, | Performed by: ORTHOPAEDIC SURGERY

## 2019-11-25 PROCEDURE — 99999 PR PBB SHADOW E&M-EST. PATIENT-LVL II: ICD-10-PCS | Mod: PBBFAC,,, | Performed by: ORTHOPAEDIC SURGERY

## 2019-11-25 PROCEDURE — 73562 X-RAY EXAM OF KNEE 3: CPT | Mod: 26,LT,, | Performed by: RADIOLOGY

## 2019-11-25 PROCEDURE — 73562 XR KNEE ORTHO LEFT: ICD-10-PCS | Mod: 26,LT,, | Performed by: RADIOLOGY

## 2019-11-25 PROCEDURE — 73560 X-RAY EXAM OF KNEE 1 OR 2: CPT | Mod: 26,59,RT, | Performed by: RADIOLOGY

## 2019-11-25 RX ORDER — TRAMADOL HYDROCHLORIDE 50 MG/1
50 TABLET ORAL EVERY 8 HOURS PRN
Refills: 1 | COMMUNITY
Start: 2019-10-29 | End: 2020-03-03

## 2019-11-25 RX ORDER — MECLIZINE HYDROCHLORIDE 25 MG/1
TABLET ORAL
COMMUNITY
Start: 2019-04-28 | End: 2020-03-03

## 2019-11-25 RX ORDER — IBUPROFEN 200 MG
TABLET ORAL EVERY 6 HOURS PRN
COMMUNITY
End: 2023-07-26

## 2019-11-25 NOTE — PROGRESS NOTES
Three months post arthroplasty.  Doing well.  No signs of infection.  Patient is instructed to continue with strengthening exercises while being mindful of the inherent limitations of the implant.  Follow up as needed.

## 2019-11-26 ENCOUNTER — OFFICE VISIT (OUTPATIENT)
Dept: OTOLARYNGOLOGY | Facility: CLINIC | Age: 64
End: 2019-11-26
Payer: COMMERCIAL

## 2019-11-26 VITALS — BODY MASS INDEX: 26.91 KG/M2 | WEIGHT: 181.69 LBS | HEIGHT: 69 IN

## 2019-11-26 DIAGNOSIS — H90.3 ASYMMETRICAL SENSORINEURAL HEARING LOSS: ICD-10-CM

## 2019-11-26 DIAGNOSIS — H93.13 TINNITUS, BILATERAL: ICD-10-CM

## 2019-11-26 DIAGNOSIS — H61.23 BILATERAL IMPACTED CERUMEN: Primary | ICD-10-CM

## 2019-11-26 PROCEDURE — 99203 PR OFFICE/OUTPT VISIT, NEW, LEVL III, 30-44 MIN: ICD-10-PCS | Mod: 25,S$GLB,, | Performed by: NURSE PRACTITIONER

## 2019-11-26 PROCEDURE — 69210 REMOVE IMPACTED EAR WAX UNI: CPT | Mod: S$GLB,,, | Performed by: NURSE PRACTITIONER

## 2019-11-26 PROCEDURE — 99999 PR PBB SHADOW E&M-EST. PATIENT-LVL III: CPT | Mod: PBBFAC,,, | Performed by: NURSE PRACTITIONER

## 2019-11-26 PROCEDURE — 99999 PR PBB SHADOW E&M-EST. PATIENT-LVL III: ICD-10-PCS | Mod: PBBFAC,,, | Performed by: NURSE PRACTITIONER

## 2019-11-26 PROCEDURE — 69210 PR REMOVAL IMPACTED CERUMEN REQUIRING INSTRUMENTATION, UNILATERAL: ICD-10-PCS | Mod: S$GLB,,, | Performed by: NURSE PRACTITIONER

## 2019-11-26 PROCEDURE — 99203 OFFICE O/P NEW LOW 30 MIN: CPT | Mod: 25,S$GLB,, | Performed by: NURSE PRACTITIONER

## 2019-11-26 NOTE — PATIENT INSTRUCTIONS
Tinnitus (Ringing in the Ears)     Treatment may include maskers and hearing aids.     Tinnitus is the term for a noise in your ear not caused by an outside sound. The noise might be a ringing, clicking, hiss, or roar. It can vary in pitch and may be soft or quite loud. For some people, tinnitus is a minor nuisance. But for others, the noise can make it hard to hear, work, and even sleep. When tinnitus can't be cured, a number of treatments may offer relief.  What causes tinnitus?  Loud noises, hearing loss, and ear wax can cause tinnitus. So can certain medicines. Large amounts of aspirin or caffeine are sometimes to blame. In many cases, the exact cause of tinnitus is unknown.  How is tinnitus treated?  Identifying and removing the cause is the best way to treat tinnitus. For that reason, your healthcare provider may refer you to an otolaryngologist (ear, nose, and throat doctor). Your hearing may also be checked by an audiologist (hearing specialist). If you have hearing loss, wearing a hearing aid may help your tinnitus. When the cause can't be found, the tinnitus itself may be treated. Some of the treatments are listed below, and your healthcare provider can tell you more about them:  · Maskers are small devices that look like hearing aids. They emit a pleasant sound that helps cover up the ringing in your ears. Hearing aids and maskers are sometimes used together.  · Medicines that treat anxiety and depression may ease tinnitus in some people.  · Hypnosis or relaxation therapy may help head noise seem less severe.  · Tinnitus retraining therapy combines counseling and maskers. Both can help take your mind off the tinnitus.  Tinnitus measures:  1.  Avoid exposure to loud sounds, which will exacerbate tinnitus.  Wear ear protection around loud noises.  2.  Get your blood pressure check regularly.  If it is high, see your doctor.  3.  Check with your PCP whether labs can be done to check for anemia and  hyperthyroid, as these can worsen tinnitus.   4.  Decrease salt intake.  5.  Avoid stimulants such as caffeine and tobacco.  6.  Exercise daily to improve circulation. Poor circulation worsens tinnitus.   7.  Avoid sleep deprivation. Sleep deprivation and insomnia can worsen tinnitus. Get adequate rest.  8.  Reduce aspirin use, if possible. Aspirin as well as NSAIDs and narcotic pain relievers can exacerbate tinnitus.   9.  Reduce quinine consumption (taken for leg cramps and in certain tonic water preparations).  10.  Stop worrying about the noise.  Stress worsens tinnitus. Recognize the noise as an annoyance and learned to ignore it as much as possible. Patients with higher rates of anxiety, depression, concentration, or problems sleeping may need to discuss taking something for anxiety or depression with their primary care provider.   11.  Consider a trial of lipoflavanoids, slow-release niacin, or Arches' Tinnitus Formula (over-the-counter).  12.  Purchase a white noise machine to mask tinnitus.  13. Cognitive Behavioral Therapy, otherwise known as Tinnitus Retraining Therapy, can be done by an audiologist certified in TRT or a cognitive behavioral therapist certified in TRT.   · 14.  When no underlying pathology can be identified, an audiogram is needed to screen for hearing loss. If hearing loss is noted, hearing aids with masking technology are recommended to help relieve tinnitus.     For more information  · American Speech-Hearing-Language Association 595-097-0363 www.jen.org  · American Tinnitus Association 835-214-1591 www.anna.org  · National [place] on Deafness and other Communication Disorders 897-665-1628 www.nidcd.nih.gov     Eelimination of exacerbating factors such as elevated blood pressure, high sodium intake, caffeine/stimulants, sleep deprivation/insomnia, certain medications, exposure to loud sounds, etc.   White noise, hearing aids w/masking technology, and tinnitus retraining therapy  (TRT).

## 2019-11-26 NOTE — PROGRESS NOTES
Subjective:       Patient ID: Edy Bartlett III is a 64 y.o. male.    Chief Complaint: No chief complaint on file.    HPI   Patient last seen by me over 3 years ago for ears. Patient returns today for both ears clogged up X 2 weeks.  He denies otalgia or otorrhea.  He denies any other ENT issues or concerns at the present time.  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     Review of Systems   Constitutional: Negative.    HENT: Positive for hearing loss.    Eyes: Negative.    Respiratory: Negative.    Cardiovascular: Negative.    Gastrointestinal: Negative.    Musculoskeletal: Negative.    Skin: Negative.    Neurological: Negative.    Hematological: Negative.    Psychiatric/Behavioral: Negative.        Objective:      Physical Exam   Constitutional: He is oriented to person, place, and time. Vital signs are normal. He appears well-developed and well-nourished. He is cooperative. He does not appear ill. No distress.   HENT:   Head: Normocephalic and atraumatic.   Right Ear: Hearing, tympanic membrane, external ear and ear canal normal. Tympanic membrane is not erythematous. No middle ear effusion.   Left Ear: Hearing, tympanic membrane, external ear and ear canal normal. Tympanic membrane is not erythematous.  No middle ear effusion.   Nose: Nose normal.   Mouth/Throat: Uvula is midline, oropharynx is clear and moist and mucous membranes are normal. He has dentures.     SEPARATE PROCEDURE IN OFFICE:   Procedure: Removal of impacted cerumen, bilateral   Pre Procedure Diagnosis: Cerumen Impaction   Post Procedure Diagnosis: Cerumen Impaction   Verbal informed consent in regards to risk of trauma to ear canal, ear drum or hearing, discomfort during procedure and/or inability to remove cerumen impaction in one session or unforeseen events or complications.   No anesthesia.     Procedure in detail:   Ear canal visualized bilateral with appropriate size ear  speculum utilizing Operating Head Binocular Otomicroscope   Utilizing the following: Ring curet, delicate alligator forceps. The impacted cerumen of the ear canals was removed atraumatically. The TM and EAC were then inspected and found to be clear of wax. See description of TMs/EACs in PE above.   Complications: No   Condition: Improved/Good     Eyes: EOM and lids are normal. Right eye exhibits no discharge. Left eye exhibits no discharge. No scleral icterus.   Neck: Trachea normal and normal range of motion. Neck supple. No tracheal deviation present.   Cardiovascular: Normal rate.   Pulmonary/Chest: Effort normal. No stridor. No respiratory distress. He has no wheezes.   Musculoskeletal: Normal range of motion.   Neurological: He is alert and oriented to person, place, and time. He has normal strength. Coordination and gait normal.   Skin: Skin is warm, dry and intact. He is not diaphoretic. No cyanosis. No pallor.   Psychiatric: He has a normal mood and affect. His speech is normal and behavior is normal. Judgment and thought content normal. Cognition and memory are normal.   Nursing note and vitals reviewed.      Assessment:     Bilateral cerumen impactions removed    Asymmetrical SNHL  Tinnitus AS>AD  Plan:     Offered audiogram which pt politely declined  Tinnitus handout given and discussed at length. Discussed elimination of exacerbating factors such as elevated blood pressure, high sodium intake, caffeine/stimulants, sleep deprivation/insomnia, certain medications, exposure to loud sounds, etc. Discussed white noise, hearing aids w/masking technology, and tinnitus retraining therapy (TRT). All questions answered.     Return to clinic as needed for further ENT concerns

## 2020-02-18 ENCOUNTER — TELEPHONE (OUTPATIENT)
Dept: GASTROENTEROLOGY | Facility: CLINIC | Age: 65
End: 2020-02-18

## 2020-02-18 NOTE — TELEPHONE ENCOUNTER
Pt refused to schedule cscope. States he will not be having this done. Order canceled, PCP notified.

## 2020-02-20 ENCOUNTER — OFFICE VISIT (OUTPATIENT)
Dept: FAMILY MEDICINE | Facility: CLINIC | Age: 65
End: 2020-02-20
Payer: COMMERCIAL

## 2020-02-20 VITALS
HEIGHT: 69 IN | BODY MASS INDEX: 28.24 KG/M2 | HEART RATE: 95 BPM | WEIGHT: 190.69 LBS | DIASTOLIC BLOOD PRESSURE: 82 MMHG | SYSTOLIC BLOOD PRESSURE: 112 MMHG | OXYGEN SATURATION: 96 %

## 2020-02-20 DIAGNOSIS — M54.2 CERVICAL PAIN (NECK): Primary | ICD-10-CM

## 2020-02-20 DIAGNOSIS — K11.7 XEROSTOMIA DUE TO RADIOTHERAPY: ICD-10-CM

## 2020-02-20 DIAGNOSIS — Z23 NEED FOR VACCINATION: ICD-10-CM
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
## 2020-02-20 DIAGNOSIS — L85.3 DRY SKIN DERMATITIS: ICD-10-CM

## 2020-02-20 PROCEDURE — 90472 IMMUNIZATION ADMIN EACH ADD: CPT | Mod: S$GLB,,, | Performed by: INTERNAL MEDICINE

## 2020-02-20 PROCEDURE — 90471 FLU VACCINE (QUAD) GREATER THAN OR EQUAL TO 3YO PRESERVATIVE FREE IM: ICD-10-PCS | Mod: S$GLB,,, | Performed by: INTERNAL MEDICINE

## 2020-02-20 PROCEDURE — 99214 OFFICE O/P EST MOD 30 MIN: CPT | Mod: 25,S$GLB,, | Performed by: INTERNAL MEDICINE

## 2020-02-20 PROCEDURE — 99999 PR PBB SHADOW E&M-EST. PATIENT-LVL IV: ICD-10-PCS | Mod: PBBFAC,,, | Performed by: INTERNAL MEDICINE

## 2020-02-20 PROCEDURE — 90686 IIV4 VACC NO PRSV 0.5 ML IM: CPT | Mod: S$GLB,,, | Performed by: INTERNAL MEDICINE

## 2020-02-20 PROCEDURE — 99999 PR PBB SHADOW E&M-EST. PATIENT-LVL IV: CPT | Mod: PBBFAC,,, | Performed by: INTERNAL MEDICINE

## 2020-02-20 PROCEDURE — 90686 FLU VACCINE (QUAD) GREATER THAN OR EQUAL TO 3YO PRESERVATIVE FREE IM: ICD-10-PCS | Mod: S$GLB,,, | Performed by: INTERNAL MEDICINE

## 2020-02-20 PROCEDURE — 90471 IMMUNIZATION ADMIN: CPT | Mod: S$GLB,,, | Performed by: INTERNAL MEDICINE

## 2020-02-20 PROCEDURE — 90472 PNEUMOCOCCAL POLYSACCHARIDE VACCINE 23-VALENT =>2YO SQ IM: ICD-10-PCS | Mod: S$GLB,,, | Performed by: INTERNAL MEDICINE

## 2020-02-20 PROCEDURE — 90732 PNEUMOCOCCAL POLYSACCHARIDE VACCINE 23-VALENT =>2YO SQ IM: ICD-10-PCS | Mod: S$GLB,,, | Performed by: INTERNAL MEDICINE

## 2020-02-20 PROCEDURE — 90732 PPSV23 VACC 2 YRS+ SUBQ/IM: CPT | Mod: S$GLB,,, | Performed by: INTERNAL MEDICINE

## 2020-02-20 PROCEDURE — 99214 PR OFFICE/OUTPT VISIT, EST, LEVL IV, 30-39 MIN: ICD-10-PCS | Mod: 25,S$GLB,, | Performed by: INTERNAL MEDICINE

## 2020-02-20 RX ORDER — CAFFEINE 200 MG
200 TABLET ORAL EVERY 4 HOURS PRN
COMMUNITY
End: 2020-06-10

## 2020-02-20 RX ORDER — TIZANIDINE 4 MG/1
4 TABLET ORAL EVERY 8 HOURS
Qty: 30 TABLET | Refills: 0 | Status: SHIPPED | OUTPATIENT
Start: 2020-02-20 | End: 2020-06-10

## 2020-02-20 NOTE — PROGRESS NOTES
Subjective:       Patient ID: Edy Bartlett III is a 64 y.o. male.    Chief Complaint: Neck Pain and Sore Throat    HPI Pt is a 65 y/o M s/p C3-C7 fusion and radiation for nasopharyngeal carcinoma. He reports increasingly dry mouth, decreased taste, itchy throat, dry cough (not a/w time of day), HA, head itching worse last month and a half, ananda horses in neck - more frequent recently. Pain is localized to specific area of neck at a time and does not radiate. These sx wake him up from his sleep. He eats ibuprofen every morning and he takes caffeine pills for HA. Cough drops and ice cream help his throat. His radiation specialist Dr. Austin told him to expect less episodes, but they would be worse. He is unable to swallow dry foods such as steak, chicken, breads, or spicy foods. Otherwise he denies dysphagia, odynophagia. Any time he eats his nose runs, he uses a spray for this. No change in vision. No acute change in hearing, though he is [place] due to radiation burning his ear drums. Will get flu shot, has not got the pnumococcal vaccine, shingles. Non-drinker, non-smoker, no drug use. Discussed he has too many life stressors for a colonoscopy right now, but will look into it in the future.     Review of Systems   Constitutional: Positive for fatigue. Negative for appetite change, chills, diaphoresis, fever and unexpected weight change.   HENT: Positive for hearing loss, postnasal drip and tinnitus. Negative for drooling, ear discharge, ear pain, facial swelling, mouth sores, nosebleeds, rhinorrhea, sinus pressure, sinus pain, sneezing, sore throat and trouble swallowing.    Eyes: Negative.    Respiratory: Positive for cough. Negative for choking, chest tightness, shortness of breath and wheezing.    Cardiovascular: Negative for chest pain, palpitations and leg swelling.   Gastrointestinal: Negative for abdominal pain, blood in stool, constipation, diarrhea, nausea and vomiting.   Endocrine: Positive for  polydipsia. Negative for polyphagia and polyuria.   Genitourinary: Negative for decreased urine volume, difficulty urinating, discharge, dysuria, frequency, hematuria and penile pain.   Musculoskeletal: Positive for back pain, neck pain and neck stiffness. Negative for joint swelling.   Skin: Negative for color change and rash.   Neurological: Positive for headaches. Negative for dizziness, tremors, seizures, syncope, light-headedness and numbness.   Hematological: Negative for adenopathy. Does not bruise/bleed easily.   Psychiatric/Behavioral: Positive for sleep disturbance. Negative for agitation, confusion and decreased concentration. The patient is not nervous/anxious.        Objective:      Physical Exam   Constitutional: He is oriented to person, place, and time. He appears well-developed and well-nourished.   HENT:   Head: Normocephalic and atraumatic.   Right Ear: External ear normal.   Left Ear: External ear normal.   Nose: Nose normal.   Mouth/Throat: Oropharynx is clear and moist.   Eyes: Pupils are equal, round, and reactive to light. Conjunctivae and EOM are normal.   Neck: Normal range of motion. Neck supple. No thyromegaly present.   Cardiovascular: Normal rate, regular rhythm, normal heart sounds and intact distal pulses. Exam reveals no friction rub.   No murmur heard.  Pulmonary/Chest: Effort normal and breath sounds normal. No stridor. No respiratory distress. He has no wheezes. He has no rales. He exhibits no tenderness.   Abdominal: Soft. Bowel sounds are normal. He exhibits no distension. There is no tenderness. There is no guarding.   Musculoskeletal: He exhibits no edema.   LROM of neck due to cervical fusion  Neck is TTP   Neurological: He is alert and oriented to person, place, and time.   Skin: Skin is warm and dry. Capillary refill takes less than 2 seconds. No rash noted. No erythema.   Psychiatric: He has a normal mood and affect. His behavior is normal. Judgment and thought content  normal.   Vitals reviewed.      Assessment:       Pt is a 63 y/o M s/p C3-C7 fusion and radiation for nasopharyngeal carcinoma who presents today for worsening neck and throat discomfort.     Plan:       1. Need for vaccination  - Pneumococcal Polysaccharide Vaccine (23 Valent) (SQ/IM)  - Influenza - Quadrivalent (PF)    2. Cervical pain (neck)  - tiZANidine (ZANAFLEX) 4 MG tablet; Take 1 tablet (4 mg total) by mouth every 8 (eight) hours.  Dispense: 30 tablet; Refill: 0  - Ambulatory referral/consult to Physical/Occupational Therapy; Future    3. Dry skin dermatitis  Eucerin or cetaphil.    4. 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

## 2020-02-27 ENCOUNTER — TELEPHONE (OUTPATIENT)
Dept: FAMILY MEDICINE | Facility: CLINIC | Age: 65
End: 2020-02-27

## 2020-02-27 LAB
CHOLESTEROL, TOTAL: 261
HDLC SERPL-MCNC: 27 MG/DL
LDLC SERPL CALC-MCNC: 198 MG/DL (ref 0–160)
TRIGL SERPL-MCNC: 181 MG/DL

## 2020-02-27 NOTE — TELEPHONE ENCOUNTER
----- Message from Miracle Rossi sent at 2/27/2020  3:40 PM CST -----  Contact: pt  Type:  Sooner Apoointment Request    Caller is requesting a sooner appointment.  Caller declined first available appointment listed below.  Caller will not accept being placed on the waitlist and is requesting a message be sent to doctor.  Name of Caller: the pt   When is the first available appointment? 03/06/2020  Symptoms: hospital f/u  Would the patient rather a call back or a response via [facility]? Call back  Best Call Back Number: [phone]  Additional Information: n/a

## 2020-03-03 ENCOUNTER — OFFICE VISIT (OUTPATIENT)
Dept: FAMILY MEDICINE | Facility: CLINIC | Age: 65
End: 2020-03-03
Payer: COMMERCIAL

## 2020-03-03 VITALS
HEIGHT: 69 IN | DIASTOLIC BLOOD PRESSURE: 80 MMHG | HEART RATE: 77 BPM | WEIGHT: 188.06 LBS | SYSTOLIC BLOOD PRESSURE: 142 MMHG | OXYGEN SATURATION: 95 % | BODY MASS INDEX: 27.85 KG/M2

## 2020-03-03 DIAGNOSIS — G45.9 TIA (TRANSIENT ISCHEMIC ATTACK): Primary | ICD-10-CM

## 2020-03-03 DIAGNOSIS — I95.2 HYPOTENSION DUE TO DRUGS: ICD-10-CM

## 2020-03-03 DIAGNOSIS — I10 ESSENTIAL HYPERTENSION: ICD-10-CM

## 2020-03-03 DIAGNOSIS — Z86.73 CEREBELLAR CEREBROVASCULAR ACCIDENT (CVA) WITHOUT LATE EFFECT: ICD-10-CM

## 2020-03-03 DIAGNOSIS — E53.8 B12 DEFICIENCY: ICD-10-CM

## 2020-03-03 DIAGNOSIS — E53.8 FOLATE DEFICIENCY: ICD-10-CM

## 2020-03-03 PROCEDURE — 99999 PR PBB SHADOW E&M-EST. PATIENT-LVL III: ICD-10-PCS | Mod: PBBFAC,,, | Performed by: INTERNAL MEDICINE

## 2020-03-03 PROCEDURE — 99999 PR PBB SHADOW E&M-EST. PATIENT-LVL III: CPT | Mod: PBBFAC,,, | Performed by: INTERNAL MEDICINE

## 2020-03-03 PROCEDURE — 99214 PR OFFICE/OUTPT VISIT, EST, LEVL IV, 30-39 MIN: ICD-10-PCS | Mod: S$GLB,,, | Performed by: INTERNAL MEDICINE

## 2020-03-03 PROCEDURE — 99214 OFFICE O/P EST MOD 30 MIN: CPT | Mod: S$GLB,,, | Performed by: INTERNAL MEDICINE

## 2020-03-03 RX ORDER — HYDROCODONE BITARTRATE AND ACETAMINOPHEN 5; 325 MG/1; MG/1
1 TABLET ORAL
COMMUNITY
Start: 2020-02-27 | End: 2020-03-03

## 2020-03-03 RX ORDER — DIAZEPAM 5 MG/1
5 TABLET ORAL
COMMUNITY
Start: 2020-02-27 | End: 2020-03-03

## 2020-03-03 RX ORDER — PREGABALIN 50 MG/1
50 CAPSULE ORAL
COMMUNITY
Start: 2020-02-27 | End: 2020-03-03

## 2020-03-03 RX ORDER — CARVEDILOL 12.5 MG/1
12.5 TABLET ORAL
COMMUNITY
Start: 2020-02-27 | End: 2020-03-03

## 2020-03-03 RX ORDER — SERTRALINE HYDROCHLORIDE 50 MG/1
TABLET, FILM COATED ORAL
COMMUNITY
Start: 2020-02-27 | End: 2020-03-03

## 2020-03-03 RX ORDER — PANTOPRAZOLE SODIUM 40 MG/1
40 TABLET, DELAYED RELEASE ORAL
COMMUNITY
Start: 2020-02-28 | End: 2023-07-26

## 2020-03-03 RX ORDER — ASPIRIN 81 MG/1
81 TABLET ORAL
COMMUNITY
Start: 2020-02-28 | End: 2020-03-27 | Stop reason: SDUPTHER

## 2020-03-03 RX ORDER — CLOPIDOGREL BISULFATE 75 MG/1
75 TABLET ORAL
COMMUNITY
Start: 2020-02-28 | End: 2020-03-27 | Stop reason: SDUPTHER

## 2020-03-03 RX ORDER — TIZANIDINE HYDROCHLORIDE 4 MG/1
4 CAPSULE, GELATIN COATED ORAL
COMMUNITY
End: 2020-03-27

## 2020-03-03 RX ORDER — CYANOCOBALAMIN 1000 UG/ML
INJECTION, SOLUTION INTRAMUSCULAR; SUBCUTANEOUS
COMMUNITY
Start: 2020-02-27 | End: 2020-03-27

## 2020-03-03 RX ORDER — GEMFIBROZIL 600 MG/1
600 TABLET, FILM COATED ORAL
COMMUNITY
Start: 2020-02-27 | End: 2020-06-29

## 2020-03-03 RX ORDER — LISINOPRIL 20 MG/1
20 TABLET ORAL
COMMUNITY
Start: 2020-02-27 | End: 2020-03-03

## 2020-03-03 RX ORDER — LISINOPRIL 20 MG/1
TABLET ORAL
COMMUNITY
Start: 2020-02-27 | End: 2020-03-03

## 2020-03-03 RX ORDER — CYANOCOBALAMIN 1000 UG/ML
1000 INJECTION, SOLUTION INTRAMUSCULAR; SUBCUTANEOUS
COMMUNITY
Start: 2020-02-27 | End: 2020-03-05

## 2020-03-03 RX ORDER — DOXAZOSIN 2 MG/1
2 TABLET ORAL
COMMUNITY
Start: 2020-02-27 | End: 2020-03-03

## 2020-03-03 NOTE — PROGRESS NOTES
"  Subjective     Edy Bartlett III is a 64 y.o. old, male here for Hospital Follow Up and Medication Problem    Patient is here for hospital follow-up. He was diagnosed with a TIA after acute onset hemiplegia and dysarthria. Symptoms resolved. Records reviewed. Evidence of prior cerebellar CVA's on MRI. He was started on aspirin, plavix, doxazosin, coreg, lyrica, protonix, zoloft, valium, folate, B12 and other medications. He then developed hypotension. He is now very confused about what meds to take. He did not take his meds today for fear of hypotension.      Review of Systems   Constitutional: Negative.    HENT:        Neck pain mostly resolved   Cardiovascular: Negative.    Neurological: Positive for dizziness. Negative for focal weakness.     Medications     Outpatient Medications Marked as Taking for the 3/3/20 encounter (Office Visit) with Reddy Vidal MD   Medication Sig Dispense Refill    aspirin (ECOTRIN) 81 MG EC tablet Take 81 mg by mouth.      carvediloL (COREG) 12.5 MG tablet Take 12.5 mg by mouth.      clopidogreL (PLAVIX) 75 mg tablet Take 75 mg by mouth.      cyanocobalamin 1,000 mcg/mL injection Inject 1,000 mcg into the muscle.      cyanocobalamin/folic ac/vit B6 (FOLBEE ORAL) Take by mouth.      diazePAM (VALIUM) 5 MG tablet Take 5 mg by mouth.      doxazosin (CARDURA) 2 MG tablet Take 2 mg by mouth.      folic acid-vit B6-vit B12 (FOLGARD RX) 2.2-25-1 mg Tab Take 1 tablet by mouth.      gemfibrozil (LOPID) 600 MG tablet Take 600 mg by mouth.      HYDROcodone-acetaminophen (NORCO) 5-325 mg per tablet Take 1 tablet by mouth.      lisinopril (PRINIVIL,ZESTRIL) 20 MG tablet Take 20 mg by mouth.      pantoprazole (PROTONIX) 40 MG tablet Take 40 mg by mouth.      pregabalin (LYRICA) 50 MG capsule Take 50 mg by mouth.       Objective     BP (!) 142/80 (Patient Position: Sitting)   Pulse 77   Ht 5' 9" (1.753 m)   Wt 85.3 kg (188 lb 0.8 oz)   SpO2 95%   BMI 27.77 kg/m² "   Physical Exam   Constitutional: He appears well-developed. No distress.   Neurological: He is alert.   Psychiatric: He has a normal mood and affect.     Assessment and Plan     1. TIA (transient ischemic attack)  Symptoms resolved.    2. Hypotension due to drugs  Stop all other meds, restart lisinopril 10 mg, f/u in one week.    3. Essential hypertension    4. B12 deficiency  Continue supplement.    5. Folate deficiency  Continue supplement.    6. Cerebellar cerebrovascular accident (CVA) without late effect  History of    ___________________  Reddy Vidal MD  Internal Medicine and Pediatrics

## 2020-03-10 ENCOUNTER — OFFICE VISIT (OUTPATIENT)
Dept: FAMILY MEDICINE | Facility: CLINIC | Age: 65
End: 2020-03-10
Payer: COMMERCIAL

## 2020-03-10 VITALS
OXYGEN SATURATION: 96 % | SYSTOLIC BLOOD PRESSURE: 112 MMHG | DIASTOLIC BLOOD PRESSURE: 70 MMHG | HEIGHT: 69 IN | WEIGHT: 184.31 LBS | BODY MASS INDEX: 27.3 KG/M2 | HEART RATE: 77 BPM

## 2020-03-10 DIAGNOSIS — E53.8 B12 DEFICIENCY: ICD-10-CM

## 2020-03-10 DIAGNOSIS — Z86.73 CEREBELLAR CEREBROVASCULAR ACCIDENT (CVA) WITHOUT LATE EFFECT: ICD-10-CM

## 2020-03-10 DIAGNOSIS — I10 ESSENTIAL HYPERTENSION: Primary | ICD-10-CM

## 2020-03-10 DIAGNOSIS — G45.9 TIA (TRANSIENT ISCHEMIC ATTACK): ICD-10-CM

## 2020-03-10 DIAGNOSIS — I95.2 HYPOTENSION DUE TO DRUGS: ICD-10-CM

## 2020-03-10 PROCEDURE — 99214 OFFICE O/P EST MOD 30 MIN: CPT | Mod: S$GLB,,, | Performed by: INTERNAL MEDICINE

## 2020-03-10 PROCEDURE — 99999 PR PBB SHADOW E&M-EST. PATIENT-LVL III: ICD-10-PCS | Mod: PBBFAC,,, | Performed by: INTERNAL MEDICINE

## 2020-03-10 PROCEDURE — 99214 PR OFFICE/OUTPT VISIT, EST, LEVL IV, 30-39 MIN: ICD-10-PCS | Mod: S$GLB,,, | Performed by: INTERNAL MEDICINE

## 2020-03-10 PROCEDURE — 99999 PR PBB SHADOW E&M-EST. PATIENT-LVL III: CPT | Mod: PBBFAC,,, | Performed by: INTERNAL MEDICINE

## 2020-03-10 RX ORDER — LANOLIN ALCOHOL/MO/W.PET/CERES
1000 CREAM (GRAM) TOPICAL DAILY
Qty: 90 TABLET | Refills: 3 | Status: SHIPPED | OUTPATIENT
Start: 2020-03-10 | End: 2023-07-26

## 2020-03-10 RX ORDER — CYANOCOBALAMIN/FOLIC AC/VIT B6 1-2.5-25MG
1 TABLET ORAL DAILY
COMMUNITY
Start: 2020-02-28 | End: 2020-04-06 | Stop reason: SDUPTHER

## 2020-03-10 NOTE — PROGRESS NOTES
"  Subjective     Edy Bartlett III is a 64 y.o. old, male here for Follow-up    Patient is here for follow-up on hypertension.  See last note for details.  After over treating his hypertension following a TIA he was started on multiple medications and developed syncope and hypotension.  He stopped all of his medications and at last visit we resumed lisinopril 10 mg.  He is doing well on this but continues to have some orthostatic lightheadedness with normal blood pressures. He does not monitor things at home.    ROS   Sig stress, some insomnia (wife issues)  No CP  Chronic neck pain and tinnitus    Medications     No outpatient medications have been marked as taking for the 3/10/20 encounter (Office Visit) with Reddy Vidal MD.     Objective     /70 (Patient Position: Sitting)   Pulse 77   Ht 5' 9" (1.753 m)   Wt 83.6 kg (184 lb 4.9 oz)   SpO2 96%   BMI 27.22 kg/m²   Physical Exam   Constitutional: He appears well-developed. No distress.   Cardiovascular: Normal rate, regular rhythm and intact distal pulses.   No murmur heard.  Pulmonary/Chest: Effort normal and breath sounds normal. No respiratory distress.     Assessment and Plan     1. Essential hypertension  Now controlled, continue lisinopril 10 mg  Continue treatment of B12 def, f/u regarding old cerebellar CVA    2. Hypotension due to drugs  Resolved    3. TIA (transient ischemic attack)  Symptoms resolved    4. B12 deficiency  - cyanocobalamin (VITAMIN B-12) 1000 MCG tablet; Take 1 tablet (1,000 mcg total) by mouth once daily.  Dispense: 90 tablet; Refill: 3    5. Cerebellar cerebrovascular accident (CVA) without late effect    ___________________  Reddy Vidal MD  Internal Medicine and Pediatrics  "

## 2020-03-26 ENCOUNTER — TELEPHONE (OUTPATIENT)
Dept: FAMILY MEDICINE | Facility: CLINIC | Age: 65
End: 2020-03-26

## 2020-03-26 NOTE — TELEPHONE ENCOUNTER
----- Message from Imelda Buckley sent at 3/26/2020  1:42 PM CDT -----  Type: Needs Medical Advice    Who Called:  patient  Best Call Back Number: [phone]  Additional Information: patient states that he has to schedule a f/u visit because the end of the month. Please give call back

## 2020-03-27 ENCOUNTER — OFFICE VISIT (OUTPATIENT)
Dept: FAMILY MEDICINE | Facility: CLINIC | Age: 65
End: 2020-03-27
Payer: COMMERCIAL

## 2020-03-27 ENCOUNTER — LAB VISIT (OUTPATIENT)
Dept: LAB | Facility: HOSPITAL | Age: 65
End: 2020-03-27
Attending: INTERNAL MEDICINE
Payer: COMMERCIAL

## 2020-03-27 VITALS
OXYGEN SATURATION: 95 % | HEIGHT: 69 IN | HEART RATE: 86 BPM | WEIGHT: 184.31 LBS | DIASTOLIC BLOOD PRESSURE: 80 MMHG | SYSTOLIC BLOOD PRESSURE: 132 MMHG | TEMPERATURE: 98 F | BODY MASS INDEX: 27.3 KG/M2

## 2020-03-27 DIAGNOSIS — Z86.73 HISTORY OF CVA (CEREBROVASCULAR ACCIDENT): ICD-10-CM

## 2020-03-27 DIAGNOSIS — E53.8 B12 DEFICIENCY: ICD-10-CM

## 2020-03-27 DIAGNOSIS — N28.9 RENAL INSUFFICIENCY: ICD-10-CM

## 2020-03-27 DIAGNOSIS — G62.9 NEUROPATHY: ICD-10-CM

## 2020-03-27 DIAGNOSIS — R05.8 UPPER AIRWAY COUGH SYNDROME: ICD-10-CM

## 2020-03-27 DIAGNOSIS — E53.8 FOLATE DEFICIENCY: ICD-10-CM

## 2020-03-27 DIAGNOSIS — I10 ESSENTIAL HYPERTENSION: Primary | ICD-10-CM

## 2020-03-27 DIAGNOSIS — G45.9 TIA (TRANSIENT ISCHEMIC ATTACK): ICD-10-CM

## 2020-03-27 DIAGNOSIS — I10 ESSENTIAL HYPERTENSION: ICD-10-CM

## 2020-03-27 DIAGNOSIS — M50.30 DEGENERATIVE DISC DISEASE, CERVICAL: ICD-10-CM

## 2020-03-27 LAB
ANION GAP SERPL CALC-SCNC: 8 MMOL/L (ref 8–16)
BUN SERPL-MCNC: 12 MG/DL (ref 8–23)
CALCIUM SERPL-MCNC: 9.6 MG/DL (ref 8.7–10.5)
CHLORIDE SERPL-SCNC: 105 MMOL/L (ref 95–110)
CO2 SERPL-SCNC: 26 MMOL/L (ref 23–29)
CREAT SERPL-MCNC: 1 MG/DL (ref 0.5–1.4)
EST. GFR  (AFRICAN AMERICAN): >60 ML/MIN/1.73 M^2
EST. GFR  (NON AFRICAN AMERICAN): >60 ML/MIN/1.73 M^2
FOLATE SERPL-MCNC: >40 NG/ML (ref 4–24)
GLUCOSE SERPL-MCNC: 85 MG/DL (ref 70–110)
POTASSIUM SERPL-SCNC: 4 MMOL/L (ref 3.5–5.1)
SODIUM SERPL-SCNC: 139 MMOL/L (ref 136–145)
VIT B12 SERPL-MCNC: 1847 PG/ML (ref 210–950)

## 2020-03-27 PROCEDURE — 82746 ASSAY OF FOLIC ACID SERUM: CPT

## 2020-03-27 PROCEDURE — 99214 OFFICE O/P EST MOD 30 MIN: CPT | Mod: S$GLB,,, | Performed by: INTERNAL MEDICINE

## 2020-03-27 PROCEDURE — 99214 PR OFFICE/OUTPT VISIT, EST, LEVL IV, 30-39 MIN: ICD-10-PCS | Mod: S$GLB,,, | Performed by: INTERNAL MEDICINE

## 2020-03-27 PROCEDURE — 82607 VITAMIN B-12: CPT

## 2020-03-27 PROCEDURE — 99999 PR PBB SHADOW E&M-EST. PATIENT-LVL III: CPT | Mod: PBBFAC,,, | Performed by: INTERNAL MEDICINE

## 2020-03-27 PROCEDURE — 99999 PR PBB SHADOW E&M-EST. PATIENT-LVL III: ICD-10-PCS | Mod: PBBFAC,,, | Performed by: INTERNAL MEDICINE

## 2020-03-27 PROCEDURE — 80048 BASIC METABOLIC PNL TOTAL CA: CPT

## 2020-03-27 PROCEDURE — 36415 COLL VENOUS BLD VENIPUNCTURE: CPT | Mod: PO

## 2020-03-27 RX ORDER — ASPIRIN 81 MG/1
81 TABLET ORAL DAILY
Qty: 90 TABLET | Refills: 3 | Status: SHIPPED | OUTPATIENT
Start: 2020-03-27 | End: 2021-04-06

## 2020-03-27 RX ORDER — LISINOPRIL 10 MG/1
10 TABLET ORAL DAILY
Qty: 90 TABLET | Refills: 3 | Status: SHIPPED | OUTPATIENT
Start: 2020-03-27 | End: 2021-04-13

## 2020-03-27 RX ORDER — CLOPIDOGREL BISULFATE 75 MG/1
75 TABLET ORAL DAILY
Qty: 90 TABLET | Refills: 3 | Status: SHIPPED | OUTPATIENT
Start: 2020-03-27 | End: 2021-02-17

## 2020-03-27 NOTE — PROGRESS NOTES
"  Subjective     Edy Bartlett III is a 64 y.o. old, male here for Dizziness; Neck Pain; and Fell today    Patient is here for follow-up on chronic medical problems  63 y/o with PMH of cerebellar CVA, HTN, HLD, GERD, BPH, OA s/p L TKR, DDD s/p cervical spinal fusion, neuropathy, B12 deficiency, SCC of throat s/p radiation    He complains of intermittent sharp chest pain, worse at rest, activity seems to make it go away. It occurs randomly and goes away. It is not associated with any other symptoms.    He has been taking Ibuprofen more consistently for his back and neck pain the morning. Some "ananda horses" are quite severe. He is not taking his muscle relaxer at night. Takes 600 mg of Ibuprofen and it goes away by about noon.  Jaw pain occurs similarly.    HTN: High when agitated but well controlled recently on lisinopril. Some consistent orthostatic lightheadedness that is manageable. 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    B12/folate deficiency: Due for labs, continues to take oral supplements.    Review of Systems   Constitutional: Negative.    HENT:        UAC that goes away with washing mouth and gargling. Chronic rhinorrhea when outside/eyes watering, has a nasal spray that helps.     Medications     Outpatient Medications Marked as Taking for the 3/27/20 encounter (Office Visit) with Reddy Vidal MD   Medication Sig Dispense Refill    aspirin (ECOTRIN) 81 MG EC tablet Take 1 tablet (81 mg total) by mouth once daily. 90 tablet 3    caffeine 200 mg Tab Take 200 mg by mouth every 4 (four) hours as needed.      clopidogreL (PLAVIX) 75 mg tablet Take 1 tablet (75 mg total) by mouth once daily. 90 tablet 3    cyanocobalamin (VITAMIN B-12) 1000 MCG tablet Take 1 tablet (1,000 mcg total) by mouth once daily. 90 tablet 3    cyanocobalamin 1,000 mcg/mL injection       cyanocobalamin/folic ac/vit B6 (FOLBEE ORAL) Take by mouth.      " "FOLBEE 2.5-25-1 mg Tab Take 1 tablet by mouth once daily.      folic acid-vit B6-vit B12 (FOLGARD RX) 2.2-25-1 mg Tab Take 1 tablet by mouth.      gemfibrozil (LOPID) 600 MG tablet Take 600 mg by mouth.      ibuprofen (ADVIL,MOTRIN) 200 MG tablet Take by mouth every 6 (six) hours as needed.      lisinopriL 10 MG tablet Take 1 tablet (10 mg total) by mouth once daily. 90 tablet 3    pantoprazole (PROTONIX) 40 MG tablet Take 40 mg by mouth.      tiZANidine (ZANAFLEX) 4 MG tablet Take 1 tablet (4 mg total) by mouth every 8 (eight) hours. 30 tablet 0    tiZANidine 4 mg Cap Take 4 mg by mouth.      [DISCONTINUED] aspirin (ECOTRIN) 81 MG EC tablet Take 81 mg by mouth.      [DISCONTINUED] clopidogreL (PLAVIX) 75 mg tablet Take 75 mg by mouth.      [DISCONTINUED] lisinopril 10 MG tablet Take 1 tablet (10 mg total) by mouth once daily. 90 tablet 3     Objective     /80   Pulse 86   Temp 97.8 °F (36.6 °C) (Oral)   Ht 5' 9" (1.753 m)   Wt 83.6 kg (184 lb 4.9 oz)   SpO2 95%   BMI 27.22 kg/m²   Physical Exam   Constitutional: He appears well-developed. No distress.   Cardiovascular: Normal rate, regular rhythm and intact distal pulses.   No murmur heard.  Pulmonary/Chest: Effort normal and breath sounds normal. No respiratory distress.     Assessment and Plan     1. Essential hypertension  Filled out for more time off work until orthostatic symptoms resolve. BP seems well controlled except when anxious.  - Basic metabolic panel; Future  - lisinopriL 10 MG tablet; Take 1 tablet (10 mg total) by mouth once daily.  Dispense: 90 tablet; Refill: 3    2. B12 deficiency  - Vitamin B12; Future    3. Folate deficiency  - Folate; Future    4. TIA (transient ischemic attack)    5. Renal insufficiency  Repeat BMP  Limit chronic NSAID's    6. Upper airway cough syndrome  Restart nasal spray    7. Neuropathy  2' to B12, continue supplement    8. Degenerative disc disease, cervical  Muscle relaxer at night, " stretching/yoga in am    9. History of CVA (cerebrovascular accident)  - aspirin (ECOTRIN) 81 MG EC tablet; Take 1 tablet (81 mg total) by mouth once daily.  Dispense: 90 tablet; Refill: 3  - clopidogreL (PLAVIX) 75 mg tablet; Take 1 tablet (75 mg total) by mouth once daily.  Dispense: 90 tablet; Refill: 3    ___________________  Reddy Vidal MD  Internal Medicine and Pediatrics

## 2020-04-06 RX ORDER — CYANOCOBALAMIN/FOLIC AC/VIT B6 1-2.5-25MG
1 TABLET ORAL DAILY
Qty: 90 TABLET | Refills: 3 | Status: SHIPPED | OUTPATIENT
Start: 2020-04-06 | End: 2021-04-24

## 2020-04-06 NOTE — TELEPHONE ENCOUNTER
----- Message from Carmella Quarles sent at 4/6/2020 10:49 AM CDT -----  Contact: pt  .Type:  RX Refill Request    Who Called:  pt  Refill or New Rx: refill   RX Name and Strength: cisco   How is the patient currently taking it? (ex. 1XDay):   Is this a 30 day or 90 day RX:   Preferred Pharmacy with phone number:       [facility]/pharmacy #[id number]  [place], [place] - [address] [address]  Phone: [phone] Fax: [phone]         Local or Mail Order: local   Ordering Provider:   Would the patient rather a call back or a response via [facility]?  Call back   Best Call Back Number: [phone] (home)     Additional Information:

## 2020-04-22 ENCOUNTER — PATIENT OUTREACH (OUTPATIENT)
Dept: ADMINISTRATIVE | Facility: HOSPITAL | Age: 65
End: 2020-04-22

## 2020-04-22 NOTE — PROGRESS NOTES
Colon cancer screening    COVID-19 Workflow non-compliant chart audits. Chart review completed 04/22/2020    Care Everywhere and media, updates requested and reviewed.  Lab anat, [employer], and [employer] reviewed if applies.

## 2020-04-23 ENCOUNTER — TELEPHONE (OUTPATIENT)
Dept: FAMILY MEDICINE | Facility: CLINIC | Age: 65
End: 2020-04-23

## 2020-04-23 NOTE — TELEPHONE ENCOUNTER
----- Message from Princess CHARLOTTE Lujan sent at 4/23/2020  9:06 AM CDT -----  Contact: pt  Type:  Sooner Apoointment Request    Caller is requesting a sooner appointment.  Caller declined first available appointment listed below.  Caller will not accept being placed on the waitlist and is requesting a message be sent to doctor.    Name of Caller:  Patient  When is the first available appointment?  05/07/20  Symptoms:  Work related  Best Call Back Number:    Additional Information:  Patient is requesting to be seen next week if possible.

## 2020-04-23 NOTE — TELEPHONE ENCOUNTER
----- Message from Buffy Tineo sent at 4/23/2020  2:35 PM CDT -----  Contact: [phone]  Patient is returning nurse's phone call.  Please call patient back at [phone].

## 2020-04-23 NOTE — TELEPHONE ENCOUNTER
----- Message from Buffy Tineo sent at 4/23/2020 11:44 AM CDT -----  Contact: [phone]  Patient is returning nurse's phone call.  Please call patient back at [phone].

## 2020-05-05 ENCOUNTER — PATIENT MESSAGE (OUTPATIENT)
Dept: ADMINISTRATIVE | Facility: HOSPITAL | Age: 65
End: 2020-05-05

## 2020-05-08 ENCOUNTER — OFFICE VISIT (OUTPATIENT)
Dept: FAMILY MEDICINE | Facility: CLINIC | Age: 65
End: 2020-05-08
Payer: COMMERCIAL

## 2020-05-08 VITALS
SYSTOLIC BLOOD PRESSURE: 120 MMHG | HEART RATE: 83 BPM | BODY MASS INDEX: 27.33 KG/M2 | DIASTOLIC BLOOD PRESSURE: 80 MMHG | HEIGHT: 69 IN | TEMPERATURE: 98 F | OXYGEN SATURATION: 96 % | RESPIRATION RATE: 20 BRPM | WEIGHT: 184.5 LBS

## 2020-05-08 DIAGNOSIS — I63.542 CEREBROVASCULAR ACCIDENT (CVA) DUE TO OCCLUSION OF LEFT CEREBELLAR ARTERY: Primary | ICD-10-CM

## 2020-05-08 DIAGNOSIS — I10 ESSENTIAL HYPERTENSION: ICD-10-CM

## 2020-05-08 DIAGNOSIS — H93.13 TINNITUS OF BOTH EARS: ICD-10-CM

## 2020-05-08 DIAGNOSIS — M54.2 CERVICALGIA: ICD-10-CM

## 2020-05-08 PROCEDURE — 99999 PR PBB SHADOW E&M-EST. PATIENT-LVL IV: ICD-10-PCS | Mod: PBBFAC,,, | Performed by: INTERNAL MEDICINE

## 2020-05-08 PROCEDURE — 99204 OFFICE O/P NEW MOD 45 MIN: CPT | Mod: S$GLB,,, | Performed by: INTERNAL MEDICINE

## 2020-05-08 PROCEDURE — 99204 PR OFFICE/OUTPT VISIT, NEW, LEVL IV, 45-59 MIN: ICD-10-PCS | Mod: S$GLB,,, | Performed by: INTERNAL MEDICINE

## 2020-05-08 PROCEDURE — 99999 PR PBB SHADOW E&M-EST. PATIENT-LVL IV: CPT | Mod: PBBFAC,,, | Performed by: INTERNAL MEDICINE

## 2020-05-08 RX ORDER — CLONAZEPAM 0.5 MG/1
0.5 TABLET ORAL 2 TIMES DAILY PRN
Qty: 60 TABLET | Refills: 0 | Status: SHIPPED | OUTPATIENT
Start: 2020-05-08 | End: 2020-06-10

## 2020-05-08 NOTE — PATIENT INSTRUCTIONS
Drink water numerous times each day to both moisturize mouth and keep hydrated    Cut Lisinopril to 5 mg / day

## 2020-05-08 NOTE — PROGRESS NOTES
Patient ID: Edy Bartlett III     Chief Complaint:   Chief Complaint   Patient presents with    Annual Exam     needs paperwork for work    Hypertension    Dizziness     fell last week hurt his back        HPI: New Patient to me. Here for me to fill out paperwork stating that he cannot work. 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 Since then, he's had muscle cramps / pain in muscles of jaw and neck and tinnitus unresolved w/ Tizanidine. Try Clonazepam and Physical Therapy. He had an acute Left sided CVA w/ Right sided hemiparesis and memory loss on February 26th. He has gained a lot of function but is still not back to baseline. He also gets dizzy and falls (especially upon arising) without lasting injury. Will decrease Lisinopril to 5 mg. He admits to not drinking too much water and mouth is dry due to XRT.     Review of Systems   Constitutional: Negative.    HENT: Negative.    Eyes: Negative.    Respiratory: Negative.    Cardiovascular: Negative.    Gastrointestinal: Negative.    Endocrine: Negative.    Genitourinary: Negative.    Musculoskeletal: Positive for myalgias.   Skin: Negative.    Allergic/Immunologic: Negative.    Neurological: Negative.    Hematological: Negative.    Psychiatric/Behavioral: Negative.           Objective:      Physical Exam   Physical Exam   Constitutional: He is oriented to person, place, and time. He appears well-developed and well-nourished.   HENT:   Head: Normocephalic and atraumatic.   Nose: Nose normal.   Mouth/Throat: Oropharynx is clear and moist.   Eyes: Pupils are equal, round, and reactive to light. Conjunctivae and EOM are normal.   Neck: Normal range of motion. Neck supple.   Cardiovascular: Normal rate, regular rhythm, normal heart sounds and intact distal pulses.   Pulmonary/Chest: Effort normal and breath sounds normal.   Abdominal: Soft. Bowel sounds are normal.   Musculoskeletal: Normal range of motion.   Neurological: He is alert and oriented to  "person, place, and time.   Decreased strength in Right side   Skin: Skin is warm and dry. Capillary refill takes less than 2 seconds.   Psychiatric: He has a normal mood and affect. His behavior is normal. Judgment and thought content normal.   Nursing note and vitals reviewed.      Current Outpatient Medications:     aspirin (ECOTRIN) 81 MG EC tablet, Take 1 tablet (81 mg total) by mouth once daily., Disp: 90 tablet, Rfl: 3    clopidogreL (PLAVIX) 75 mg tablet, Take 1 tablet (75 mg total) by mouth once daily., Disp: 90 tablet, Rfl: 3    cyanocobalamin (VITAMIN B-12) 1000 MCG tablet, Take 1 tablet (1,000 mcg total) by mouth once daily., Disp: 90 tablet, Rfl: 3    FOLBEE 2.5-25-1 mg Tab, Take 1 tablet by mouth once daily., Disp: 90 tablet, Rfl: 3    gemfibrozil (LOPID) 600 MG tablet, Take 600 mg by mouth., Disp: , Rfl:     lisinopriL 10 MG tablet, Take 1 tablet (10 mg total) by mouth once daily., Disp: 90 tablet, Rfl: 3    pantoprazole (PROTONIX) 40 MG tablet, Take 40 mg by mouth., Disp: , Rfl:     caffeine 200 mg Tab, Take 200 mg by mouth every 4 (four) hours as needed., Disp: , Rfl:     clonazePAM (KLONOPIN) 0.5 MG tablet, Take 1 tablet (0.5 mg total) by mouth 2 (two) times daily as needed for Anxiety., Disp: 60 tablet, Rfl: 0    ibuprofen (ADVIL,MOTRIN) 200 MG tablet, Take by mouth every 6 (six) hours as needed., Disp: , Rfl:     tiZANidine (ZANAFLEX) 4 MG tablet, Take 1 tablet (4 mg total) by mouth every 8 (eight) hours. (Patient not taking: Reported on 5/8/2020), Disp: 30 tablet, Rfl: 0         Vitals:   Vitals:    05/08/20 0805   BP: 120/80   Pulse: 83   Resp: 20   Temp: 98.2 °F (36.8 °C)   SpO2: 96%   Weight: 83.7 kg (184 lb 8.4 oz)   Height: 5' 9" (1.753 m)      Assessment:       Patient Active Problem List    Diagnosis Date Noted    Cerebrovascular accident (CVA) due to occlusion of left cerebellar artery 05/08/2020    Cervicalgia 05/08/2020    Upper airway cough syndrome 03/27/2020    " Degenerative disc disease, cervical 03/27/2020    B12 deficiency 03/03/2020    Folate deficiency 03/03/2020    TIA (transient ischemic attack) 03/03/2020    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    Diagnoses and all orders for this visit:    Edy was seen today for annual exam, hypertension and dizziness.    Diagnoses and all orders for this visit:    Cerebrovascular accident (CVA) due to occlusion of left cerebellar artery  I completed work paperwork and will reassess his abilities in 3 months    Essential hypertension  Overmedicated - cut Lisinopril to 5 mg / day  Drink water numerous times each day to both moisturize mouth and keep hydrated    Cervicalgia  -     clonazePAM (KLONOPIN) 0.5 MG tablet; Take 1 tablet (0.5 mg total) by mouth 2 (two) times daily as needed for Anxiety.  -     Ambulatory referral/consult to Physical/Occupational Therapy; Future    Tinnitus of both ears  Monitor on Clonazepam

## 2020-05-21 ENCOUNTER — TELEPHONE (OUTPATIENT)
Dept: FAMILY MEDICINE | Facility: CLINIC | Age: 65
End: 2020-05-21

## 2020-05-21 NOTE — TELEPHONE ENCOUNTER
Please see previous message. Please advise.    ----- Message from Mer Oh sent at 5/21/2020  9:42 AM CDT -----    Type: Needs Medical Advice  Who Called:  pt  Best Call Back Number: [phone]  Additional Information:   Pt is  Calling about   physical  therapy  Please  Call  For  Details //  [facility] // phone # [phone]   Fax#  [phone]

## 2020-05-22 NOTE — TELEPHONE ENCOUNTER
Is he currently getting physical therapy? I haven't ordered any recently. Did he just see an orthopedist?

## 2020-05-27 ENCOUNTER — PATIENT OUTREACH (OUTPATIENT)
Dept: ADMINISTRATIVE | Facility: HOSPITAL | Age: 65
End: 2020-05-27

## 2020-05-27 NOTE — PROGRESS NOTES
Chart review completed 05/27/2020.  Care Everywhere updates requested and reviewed.  Immunizations reconciled. Media reviewed.     DIS, Lab anat, and quest reviewed  HM updated with external Lipid report.     Message sent to patient's portal.    Health Maintenance Due   Topic Date Due    TETANUS VACCINE  08/16/1974    High Dose Statin  08/16/1977    Shingles Vaccine (1 of 2) 08/16/2006    Colonoscopy  08/16/2006

## 2020-06-10 ENCOUNTER — PATIENT OUTREACH (OUTPATIENT)
Dept: ADMINISTRATIVE | Facility: OTHER | Age: 65
End: 2020-06-10

## 2020-06-10 ENCOUNTER — OFFICE VISIT (OUTPATIENT)
Dept: FAMILY MEDICINE | Facility: CLINIC | Age: 65
End: 2020-06-10
Payer: COMMERCIAL

## 2020-06-10 VITALS
TEMPERATURE: 98 F | SYSTOLIC BLOOD PRESSURE: 138 MMHG | WEIGHT: 183.44 LBS | OXYGEN SATURATION: 98 % | DIASTOLIC BLOOD PRESSURE: 78 MMHG | HEART RATE: 75 BPM | HEIGHT: 69 IN | BODY MASS INDEX: 27.17 KG/M2

## 2020-06-10 DIAGNOSIS — I10 ESSENTIAL HYPERTENSION: ICD-10-CM

## 2020-06-10 DIAGNOSIS — I63.542 CEREBROVASCULAR ACCIDENT (CVA) DUE TO OCCLUSION OF LEFT CEREBELLAR ARTERY: ICD-10-CM

## 2020-06-10 DIAGNOSIS — M17.11 PRIMARY OSTEOARTHRITIS OF RIGHT KNEE: ICD-10-CM

## 2020-06-10 DIAGNOSIS — R29.6 FREQUENT FALLS: ICD-10-CM

## 2020-06-10 DIAGNOSIS — M25.511 RIGHT SHOULDER PAIN, UNSPECIFIED CHRONICITY: Primary | ICD-10-CM

## 2020-06-10 DIAGNOSIS — M25.511 ACUTE PAIN OF RIGHT SHOULDER: Primary | ICD-10-CM

## 2020-06-10 PROBLEM — G45.9 TIA (TRANSIENT ISCHEMIC ATTACK): Status: RESOLVED | Noted: 2020-03-03 | Resolved: 2020-06-10

## 2020-06-10 PROCEDURE — 99214 PR OFFICE/OUTPT VISIT, EST, LEVL IV, 30-39 MIN: ICD-10-PCS | Mod: S$GLB,,, | Performed by: INTERNAL MEDICINE

## 2020-06-10 PROCEDURE — 99214 OFFICE O/P EST MOD 30 MIN: CPT | Mod: S$GLB,,, | Performed by: INTERNAL MEDICINE

## 2020-06-10 PROCEDURE — 99999 PR PBB SHADOW E&M-EST. PATIENT-LVL IV: ICD-10-PCS | Mod: PBBFAC,,, | Performed by: INTERNAL MEDICINE

## 2020-06-10 PROCEDURE — 99999 PR PBB SHADOW E&M-EST. PATIENT-LVL IV: CPT | Mod: PBBFAC,,, | Performed by: INTERNAL MEDICINE

## 2020-06-10 NOTE — PROGRESS NOTES
Subjective     Edy Bartlett III is a 64 y.o. old, male here for Follow-up    Patient is here for follow-up on chronic medical problems, needs disability paperwork filled out, with new complaints of right shoulder pain, and ongoing neck and jaw pain.    Since our last visit he was seen by Dr. Cordova.  He had his lisinopril cut in half secondary to falls he was having.  His fall seem to be a result of his cerebellar CVA and not hypotension although he did have a history of hypotension when he was overtreated following admission to [facility]. Dr. Cordova prescribed klonopin which has not helped.  He has not been able to return to his normal activities after his CVA.  He continues to do physical therapy for his neck and back.  Right shoulder pain started sometime after a fall several weeks ago. He goes to [facility].    Review of Systems   Constitutional: Negative.    Respiratory: Negative.    Cardiovascular: Negative.    Musculoskeletal: Positive for back pain, falls, joint pain and neck pain.     Medications     Outpatient Medications Marked as Taking for the 6/10/20 encounter (Office Visit) with Reddy Vidal MD   Medication Sig Dispense Refill    aspirin (ECOTRIN) 81 MG EC tablet Take 1 tablet (81 mg total) by mouth once daily. 90 tablet 3    clopidogreL (PLAVIX) 75 mg tablet Take 1 tablet (75 mg total) by mouth once daily. 90 tablet 3    cyanocobalamin (VITAMIN B-12) 1000 MCG tablet Take 1 tablet (1,000 mcg total) by mouth once daily. 90 tablet 3    FOLBEE 2.5-25-1 mg Tab Take 1 tablet by mouth once daily. 90 tablet 3    ibuprofen (ADVIL,MOTRIN) 200 MG tablet Take by mouth every 6 (six) hours as needed.      lisinopriL 10 MG tablet Take 1 tablet (10 mg total) by mouth once daily. 90 tablet 3    [DISCONTINUED] caffeine 200 mg Tab Take 200 mg by mouth every 4 (four) hours as needed.      [DISCONTINUED] clonazePAM (KLONOPIN) 0.5 MG tablet Take 1 tablet (0.5 mg total) by mouth 2 (two) times daily  "as needed for Anxiety. 60 tablet 0    [DISCONTINUED] tiZANidine (ZANAFLEX) 4 MG tablet Take 1 tablet (4 mg total) by mouth every 8 (eight) hours. 30 tablet 0     Objective     /78 (BP Location: Right arm, Patient Position: Sitting)   Pulse 75   Temp 97.6 °F (36.4 °C) (Oral)   Ht 5' 9" (1.753 m)   Wt 83.2 kg (183 lb 6.8 oz)   SpO2 98%   BMI 27.09 kg/m²   Physical Exam   Constitutional: He is oriented to person, place, and time. He appears well-developed. No distress.   Musculoskeletal:   Limited ROM R shoulder 2' pain, spasm/pain R upper trapezius   Neurological: He is alert and oriented to person, place, and time.     Assessment and Plan     1. Acute pain of right shoulder  - Ambulatory referral/consult to Orthopedics; Future    2. Cerebrovascular accident (CVA) due to occlusion of left cerebellar artery  Filled out disability paperwork    3. Frequent falls  See above, 2' to cerebellar CVA, h/o B12 deficiency    4. Primary osteoarthritis of right knee  F/u orthopedics as above    5. Essential hypertension  Will probably have to go back to lisinopril 10 mg if BP does not improve.    ___________________  Reddy Vidal MD  Internal Medicine and Pediatrics  "

## 2020-06-10 NOTE — PROGRESS NOTES
Patient's chart was reviewed.   Requested updates within Care Everywhere    Health Maintenance was updated.

## 2020-06-11 ENCOUNTER — OFFICE VISIT (OUTPATIENT)
Dept: ORTHOPEDICS | Facility: CLINIC | Age: 65
End: 2020-06-11
Payer: COMMERCIAL

## 2020-06-11 ENCOUNTER — HOSPITAL ENCOUNTER (OUTPATIENT)
Dept: RADIOLOGY | Facility: HOSPITAL | Age: 65
Discharge: HOME | End: 2020-06-11
Attending: ORTHOPAEDIC SURGERY
Payer: COMMERCIAL

## 2020-06-11 VITALS — BODY MASS INDEX: 27.17 KG/M2 | HEIGHT: 69 IN | WEIGHT: 183.44 LBS

## 2020-06-11 DIAGNOSIS — M25.511 RIGHT SHOULDER PAIN, UNSPECIFIED CHRONICITY: Primary | ICD-10-CM

## 2020-06-11 DIAGNOSIS — M25.511 ACUTE PAIN OF RIGHT SHOULDER: ICD-10-CM

## 2020-06-11 DIAGNOSIS — I63.542 CEREBROVASCULAR ACCIDENT (CVA) DUE TO OCCLUSION OF LEFT CEREBELLAR ARTERY: ICD-10-CM

## 2020-06-11 DIAGNOSIS — M75.81 TENDINITIS OF RIGHT ROTATOR CUFF: ICD-10-CM

## 2020-06-11 DIAGNOSIS — M25.511 RIGHT SHOULDER PAIN, UNSPECIFIED CHRONICITY: ICD-10-CM

## 2020-06-11 PROCEDURE — 73030 XR SHOULDER TRAUMA 3 VIEW RIGHT: ICD-10-PCS | Mod: 26,RT,, | Performed by: RADIOLOGY

## 2020-06-11 PROCEDURE — 99214 OFFICE O/P EST MOD 30 MIN: CPT | Mod: 25,S$GLB,, | Performed by: ORTHOPAEDIC SURGERY

## 2020-06-11 PROCEDURE — 20610 LARGE JOINT ASPIRATION/INJECTION: R SUBACROMIAL BURSA: ICD-10-PCS | Mod: RT,S$GLB,, | Performed by: ORTHOPAEDIC SURGERY

## 2020-06-11 PROCEDURE — 20610 DRAIN/INJ JOINT/BURSA W/O US: CPT | Mod: RT,S$GLB,, | Performed by: ORTHOPAEDIC SURGERY

## 2020-06-11 PROCEDURE — 99214 PR OFFICE/OUTPT VISIT, EST, LEVL IV, 30-39 MIN: ICD-10-PCS | Mod: 25,S$GLB,, | Performed by: ORTHOPAEDIC SURGERY

## 2020-06-11 PROCEDURE — 73030 X-RAY EXAM OF SHOULDER: CPT | Mod: TC,PO,RT

## 2020-06-11 PROCEDURE — 99999 PR PBB SHADOW E&M-EST. PATIENT-LVL III: CPT | Mod: PBBFAC,,, | Performed by: ORTHOPAEDIC SURGERY

## 2020-06-11 PROCEDURE — 73030 X-RAY EXAM OF SHOULDER: CPT | Mod: 26,RT,, | Performed by: RADIOLOGY

## 2020-06-11 PROCEDURE — 99999 PR PBB SHADOW E&M-EST. PATIENT-LVL III: ICD-10-PCS | Mod: PBBFAC,,, | Performed by: ORTHOPAEDIC SURGERY

## 2020-06-11 RX ORDER — TRIAMCINOLONE ACETONIDE 40 MG/ML
80 INJECTION, SUSPENSION INTRA-ARTICULAR; INTRAMUSCULAR
Status: DISCONTINUED | OUTPATIENT
Start: 2020-06-11 | End: 2020-06-11 | Stop reason: HOSPADM

## 2020-06-11 RX ADMIN — TRIAMCINOLONE ACETONIDE 80 MG: 40 INJECTION, SUSPENSION INTRA-ARTICULAR; INTRAMUSCULAR at 03:06

## 2020-06-11 NOTE — LETTER
June 11, 2020      Reddy Vidal MD  [address] [facility] [address] [place] [address]           [facility] [place]  [address] [place] [address]  Phone: [phone]          Patient: Edy Bartlett III   MR Number: [id number]   YOB: 1955   Date of Visit: 6/11/2020       Dear Dr. Reddy Vidal:    Thank you for referring Edy Bartlett to me for evaluation. Attached you will find relevant portions of my assessment and plan of care.    If you have questions, please do not hesitate to call me. I look forward to following Edy Bartlett along with you.    Sincerely,    Nelson Silva MD    Enclosure  CC:  No Recipients    If you would like to receive this communication electronically, please contact [email or web address] or [phone] to request more information on [employer] access.    For providers and/or their staff who would like to refer a patient to [facility], please contact us through our one-stop-shop provider referral line, [facility], at [phone].    If you feel you have received this communication in error or would no longer like to receive these types of communications, please e-mail [email or web address]

## 2020-06-11 NOTE — PROCEDURES
Large Joint Aspiration/Injection: R subacromial bursa  Date/Time: 6/11/2020 3:15 PM  Performed by: Nelson Silva MD  Authorized by: Nelson Silva MD     Consent Done?:  Yes (Verbal)  Site marked: the procedure site was marked    Prep: patient was prepped and draped in usual sterile fashion      Details:  Needle Size:  21 G  Approach:  Posterior  Location:  Shoulder  Site:  R subacromial bursa  Medications:  80 mg triamcinolone acetonide 40 mg/mL  Patient tolerance:  Patient tolerated the procedure well with no immediate complications

## 2020-06-11 NOTE — PROGRESS NOTES
64 years old states that he is doing well from his knee replacement but complaining of pain in his right shoulder which has had now for a couple months time.  He does not recall any trauma.  States it difficult to put his arm up over his head.  Patient does have history of a cervical spine fusion    Exam shows that he has difficulty putting his arm up over his head, positive Neer Arambula impingement sign, weak and painful cuff strength    X-rays show relatively well-preserved spacing of the glenohumeral joint    Assessment:  Degenerative rotator cuff disease right shoulder    Plan:  Kenalog injection into the right shoulder, give him some shoulder exercises that he can bring to the therapist that he is currently working with for other ailments    Further History  Aching pain  Worse with activity  Relieved with rest  No other associated symptoms  No other radiation    Further Exam  Alert and oriented  Pleasant  Contralateral limb has appropriate range of motion for age and condition  Contralateral limb has appropriate strength for age and condition  Contralateral limb has appropriate stability  for age and condition  No adenopathy  Pulses are appropriate for current condition  Skin is intact        Chief Complaint    Chief Complaint   Patient presents with    Right Shoulder - Pain       HPI  Edy Bartlett III is a 64 y.o.  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    Cerebellar stroke     Complication of anesthesia     gets very sore neck post-op if his head is twisted or bent too far with intubation    DDD (degenerative disc disease)     DDD (degenerative disc disease), cervical     DDD (degenerative disc disease), lumbar     DJD (degenerative joint disease) of knee 12/10/2012    ED (erectile dysfunction)     Elevated PSA     Hyperlipidemia      Hypertension     Mass of skin 2012    left temple, possible lipoma    Mobility impaired     cane    Neuropathy     feet    OA (osteoarthritis of spine)        Past Surgical History  Past Surgical History:   Procedure Laterality Date    APPENDECTOMY      CARPAL TUNNEL RELEASE      both hands/ lt hand x 2/ rt 1    CERVICAL SPINE SURGERY      x 2;fusion C3-7, can only bend his neck a little    EPIDURAL BLOCK INJECTION      injection for pain    FRACTURE SURGERY      ankle    hematoma removed  2006    thigh    HERNIA REPAIR      R inguinal    KNEE ARTHROSCOPY      PROSTATE BIOPSY      TENDON REPAIR      TRIGGER FINGER RELEASE  2007    left    TUMOR EXCISION      fatty tumor to left front of head, has since grown back    UNICOMPARTMENTAL ARTHROPLASTY OF KNEE Left 9/10/2019    Procedure: ARTHROPLASTY, KNEE, UNICOMPARTMENTAL;  Surgeon: Nelson Silva MD;  Location: [facility];  Service: Orthopedics;  Laterality: Left;    urolift         Medications  Current Outpatient Medications   Medication Sig    aspirin (ECOTRIN) 81 MG EC tablet Take 1 tablet (81 mg total) by mouth once daily.    clopidogreL (PLAVIX) 75 mg tablet Take 1 tablet (75 mg total) by mouth once daily.    cyanocobalamin (VITAMIN B-12) 1000 MCG tablet Take 1 tablet (1,000 mcg total) by mouth once daily.    FOLBEE 2.5-25-1 mg Tab Take 1 tablet by mouth once daily.    ibuprofen (ADVIL,MOTRIN) 200 MG tablet Take by mouth every 6 (six) hours as needed.    lisinopriL 10 MG tablet Take 1 tablet (10 mg total) by mouth once daily.    gemfibrozil (LOPID) 600 MG tablet Take 600 mg by mouth.    pantoprazole (PROTONIX) 40 MG tablet Take 40 mg by mouth.     No current facility-administered medications for this visit.        Allergies  Review of patient's allergies indicates:   Allergen Reactions    Statins-hmg-coa reductase inhibitors Other (See Comments)       Family History  Family History   Problem Relation Age of Onset    Hyperlipidemia  Mother     Heart disease Mother     Diabetes Son     Diabetes type II Maternal Aunt     Prostate cancer Brother        Social History  Social History     Socioeconomic History    Marital status:      Spouse name: Not on file    Number of children: Not on file    Years of education: Not on file    Highest education level: Not on file   Occupational History     Employer:  [employer]   Social Needs    Financial resource strain: Not on file    Food insecurity:     Worry: Not on file     Inability: Not on file    Transportation needs:     Medical: Not on file     Non-medical: Not on file   Tobacco Use    Smoking status: Never Smoker    Smokeless tobacco: Never Used   Substance and Sexual Activity    Alcohol use: No    Drug use: No    Sexual activity: Not Currently   Lifestyle    Physical activity:     Days per week: Not on file     Minutes per session: Not on file    Stress: Not on file   Relationships    Social connections:     Talks on phone: Not on file     Gets together: Not on file     Attends Buddhism service: Not on file     Active member of club or organization: Not on file     Attends meetings of clubs or organizations: Not on file     Relationship status: Not on file   Other Topics Concern    Not on file   Social History Narrative    Not on file               Review of Systems     Constitutional: Negative    HENT: Negative  Eyes: Negative  Respiratory: Negative  Cardiovascular: Negative  Musculoskeletal: HPI  Skin: Negative  Neurological: Negative  Hematological: Negative  Endocrine: Negative                 Physical Exam    There were no vitals filed for this visit.  Body mass index is 27.09 kg/m².  Physical Examination:     General appearance -  well appearing, and in no distress  Mental status - awake  Neck - supple  Chest -  symmetric air entry  Heart - normal rate   Abdomen - soft      Assessment     1. Right shoulder pain, unspecified chronicity    2. Acute pain of right shoulder     3. Tendinitis of right rotator cuff    4. Cerebrovascular accident (CVA) due to occlusion of left cerebellar artery          Plan

## 2020-07-05 ENCOUNTER — PATIENT OUTREACH (OUTPATIENT)
Dept: ADMINISTRATIVE | Facility: OTHER | Age: 65
End: 2020-07-05

## 2020-07-06 ENCOUNTER — OFFICE VISIT (OUTPATIENT)
Dept: ORTHOPEDICS | Facility: CLINIC | Age: 65
End: 2020-07-06
Payer: COMMERCIAL

## 2020-07-06 VITALS — BODY MASS INDEX: 27.17 KG/M2 | HEIGHT: 69 IN | WEIGHT: 183.44 LBS

## 2020-07-06 DIAGNOSIS — M25.511 RIGHT SHOULDER PAIN, UNSPECIFIED CHRONICITY: ICD-10-CM

## 2020-07-06 DIAGNOSIS — M25.511 ACUTE PAIN OF RIGHT SHOULDER: Primary | ICD-10-CM

## 2020-07-06 PROCEDURE — 99213 PR OFFICE/OUTPT VISIT, EST, LEVL III, 20-29 MIN: ICD-10-PCS | Mod: S$GLB,,, | Performed by: ORTHOPAEDIC SURGERY

## 2020-07-06 PROCEDURE — 99999 PR PBB SHADOW E&M-EST. PATIENT-LVL III: CPT | Mod: PBBFAC,,, | Performed by: ORTHOPAEDIC SURGERY

## 2020-07-06 PROCEDURE — 99213 OFFICE O/P EST LOW 20 MIN: CPT | Mod: S$GLB,,, | Performed by: ORTHOPAEDIC SURGERY

## 2020-07-06 PROCEDURE — 99999 PR PBB SHADOW E&M-EST. PATIENT-LVL III: ICD-10-PCS | Mod: PBBFAC,,, | Performed by: ORTHOPAEDIC SURGERY

## 2020-07-06 NOTE — PROGRESS NOTES
64 years old with persistent pain in the shoulder.  We had given him injection without much relief into his right shoulder.  Does have history of proximal biceps rupture.  He does report that this occurred several years ago which has few days ago he felt a pop in his shoulder.    Exam shows findings consistent with chronic proximal biceps rupture with distal retraction, weak and painful cuff strength, Neer Arambula impingement sign are positive    Assessment:  Degenerative disease of the right shoulder    Plan:  Continue with therapy, follow-up as needed

## 2020-07-07 ENCOUNTER — PATIENT OUTREACH (OUTPATIENT)
Dept: ADMINISTRATIVE | Facility: OTHER | Age: 65
End: 2020-07-07

## 2020-07-28 ENCOUNTER — PATIENT OUTREACH (OUTPATIENT)
Dept: ADMINISTRATIVE | Facility: OTHER | Age: 65
End: 2020-07-28

## 2020-08-06 ENCOUNTER — OFFICE VISIT (OUTPATIENT)
Dept: OTOLARYNGOLOGY | Facility: CLINIC | Age: 65
End: 2020-08-06
Payer: COMMERCIAL

## 2020-08-06 VITALS
BODY MASS INDEX: 26.45 KG/M2 | DIASTOLIC BLOOD PRESSURE: 77 MMHG | HEIGHT: 69 IN | SYSTOLIC BLOOD PRESSURE: 120 MMHG | WEIGHT: 178.56 LBS

## 2020-08-06 DIAGNOSIS — H61.22 IMPACTED CERUMEN OF LEFT EAR: ICD-10-CM

## 2020-08-06 DIAGNOSIS — R42 VERTIGO: ICD-10-CM

## 2020-08-06 DIAGNOSIS — H65.192 ACUTE NON-SUPPURATIVE OTITIS MEDIA, LEFT: Primary | ICD-10-CM

## 2020-08-06 PROCEDURE — 69210 REMOVE IMPACTED EAR WAX UNI: CPT | Mod: S$GLB,,, | Performed by: NURSE PRACTITIONER

## 2020-08-06 PROCEDURE — 99214 PR OFFICE/OUTPT VISIT, EST, LEVL IV, 30-39 MIN: ICD-10-PCS | Mod: 25,S$GLB,, | Performed by: NURSE PRACTITIONER

## 2020-08-06 PROCEDURE — 99999 PR PBB SHADOW E&M-EST. PATIENT-LVL IV: CPT | Mod: PBBFAC,,, | Performed by: NURSE PRACTITIONER

## 2020-08-06 PROCEDURE — 99214 OFFICE O/P EST MOD 30 MIN: CPT | Mod: 25,S$GLB,, | Performed by: NURSE PRACTITIONER

## 2020-08-06 PROCEDURE — 69210 PR REMOVAL IMPACTED CERUMEN REQUIRING INSTRUMENTATION, UNILATERAL: ICD-10-PCS | Mod: S$GLB,,, | Performed by: NURSE PRACTITIONER

## 2020-08-06 PROCEDURE — 99999 PR PBB SHADOW E&M-EST. PATIENT-LVL IV: ICD-10-PCS | Mod: PBBFAC,,, | Performed by: NURSE PRACTITIONER

## 2020-08-06 RX ORDER — TRIAMCINOLONE ACETONIDE 1 MG/G
CREAM TOPICAL
COMMUNITY
Start: 2020-08-04 | End: 2023-07-26

## 2020-08-06 RX ORDER — AZELASTINE 1 MG/ML
1 SPRAY, METERED NASAL 2 TIMES DAILY
Qty: 30 ML | Refills: 12 | Status: SHIPPED | OUTPATIENT
Start: 2020-08-06 | End: 2022-08-18

## 2020-08-06 NOTE — PROGRESS NOTES
Subjective:       Patient ID: Edy Bartlett III is a 64 y.o. male.    Chief Complaint: Ear Fullness (left ear), Otalgia (left ear), and Dizziness    Ear Fullness   Associated symptoms include hearing loss.   Otalgia   Associated symptoms include hearing loss.   Dizziness:    Associated symptoms: hearing loss and ear pain.     Patient returns for left ear clogged up X 2 weeks.  He denies otalgia or otorrhea.  Patient has been having dizzy spells which cause him to fall. He leaned over on Sunday, became dizzy and fell forward. He fell in his shop after turning around quickly.   Patient is >5 years removed from T1N0M0 SCC of Left nasopharynx treated with surgery, chemo, radiation. H/o CVA/TIA.     Review of Systems   Constitutional: Negative.    HENT: Positive for ear pain and hearing loss.    Eyes: Negative.    Respiratory: Negative.    Cardiovascular: Negative.    Gastrointestinal: Negative.    Musculoskeletal: Negative.    Skin: Negative.    Neurological: Positive for dizziness.   Hematological: Negative.    Psychiatric/Behavioral: Negative.        Objective:      Physical Exam  Vitals signs and nursing note reviewed.   Constitutional:       General: He is not in acute distress.     Appearance: He is well-developed. He is not ill-appearing or diaphoretic.   HENT:      Head: Normocephalic and atraumatic.      Right Ear: Hearing, tympanic membrane, ear canal and external ear normal. No middle ear effusion. Tympanic membrane is not erythematous. Tympanic membrane has normal mobility.      Left Ear: Hearing, ear canal and external ear normal. A middle ear effusion is present. Tympanic membrane is not erythematous. Tympanic membrane has decreased mobility.      Nose: Nose normal.      Mouth/Throat:      Dentition: Has dentures.      Pharynx: Uvula midline.   Eyes:      General: Lids are normal. No scleral icterus.        Right eye: No discharge.         Left eye: No discharge.   Neck:      Musculoskeletal: Normal range  of motion and neck supple.      Trachea: Trachea normal. No tracheal deviation.   Cardiovascular:      Rate and Rhythm: Normal rate.   Pulmonary:      Effort: Pulmonary effort is normal. No respiratory distress.      Breath sounds: No stridor. No wheezing.   Musculoskeletal: Normal range of motion.   Skin:     General: Skin is warm and dry.      Coloration: Skin is not pale.   Neurological:      Mental Status: He is alert and oriented to person, place, and time.      Coordination: Coordination normal.      Gait: Gait normal.   Psychiatric:         Speech: Speech normal.         Behavior: Behavior normal. Behavior is cooperative.         Thought Content: Thought content normal.         Judgment: Judgment normal.         SEPARATE PROCEDURE IN OFFICE:   Procedure: Removal of impacted cerumen, LEFT   Pre Procedure Diagnosis: Cerumen Impaction   Post Procedure Diagnosis: Cerumen Impaction   Verbal informed consent in regards to risk of trauma to ear canal, ear drum or hearing, discomfort during procedure and/or inability to remove cerumen impaction in one session or unforeseen events or complications.   No anesthesia.     Procedure in detail:   Ear canal visualized bilateral with appropriate size ear speculum utilizing Operating Head Binocular Otomicroscope   Utilizing the following:  Ring curet, delicate alligator forceps, and/or suction cannula was used. The impacted cerumen of the ear canals was removed atraumatically. The TM and EAC were then inspected and found to be clear of wax. See description of TMs/EACs in PE above.   Complications: No   Condition: Improved/Good      Negative Hawa-Hallpike AU       Assessment:     Left cerumen impaction removed    Asymmetrical SNHL  Tinnitus AS>AD  Plan:     Astelin BID, nasal valsalva several times per day.  Patient needs to return in 3-4 weeks for audiogram and VNG -- will requent to have RAFAEL Aguilar reach out to him to coordinate.

## 2020-08-06 NOTE — Clinical Note
Patient will need audiogram & VNG; however I would like to give him 4 weeks to clear his left middle ear fluid. Would you please reach out to patient to schedule audiogram and VNG approximately 4 weeks from now?  Thank you.

## 2020-08-06 NOTE — PATIENT INSTRUCTIONS
"You have fluid behind your ear drum.    Antibiotics are only effective at resolving infection; however fluid can sometimes remain for 4-12 weeks.    There are only two ways to get rid of fluid stuck behind the ear drum:    (1) The conservative approach -- nasal sprays twice daily (Flonase & Astelin), nasal valsalva/popping, and time (sometimes several weeks).     (2) The invasive approach -- your ear drum is pierced with a sharp tool and the fluid is suctioned out by our ear surgeon. This provides instant relief; however there is the risk that the ear drum may not heal properly or that the fluid may return requiring repeating the procedure or putting a drainage tube in your ear drum.     DIFFERENT TYPES OF "ENT-APPROVED" NASAL SPRAYS:  · Astelin / azelastine (antihistamine spray) is best for itchy, drippy, sneezy.    Use as directed, spraying 1-2 times in each nostril each day. It may take 2-3 days to 2-3 weeks to begin seeing improvement. This medication needs to be taken consistently to see results.     Nasal spray instructions:  Blow nose first gently to clean. Keep chin level with the floor (do not tilt head forward or back). Using the opposite hand (example: right hand for left nostril, left hand for right nostril) insert nasal spray taking caution to direct it AWAY from the middle wall inside the nose (septum) to avoid irritating nasal septum which could cause nosebleed.  Do not tilt spray up but rather flat and out along the roof of your mouth to spray. Angle the tip of the spray out slightly toward the direction of the ears; then spray. Do not take quick vigorous sniff but rather slow gentle inhalation while waiting for medication to absorb into nasal passages. Then administer second spray in same way.     Overall, this is a well-tolerated medication with low side effects. The benefit of nasal steroids as opposed to oral steroids is that the nasal steroid spray works primarily in the nose. Common side " "effects can include: headache, nasal dryness, minor nose bleed.  Rare side effects may include:  septal perforation, elevation in eye pressure, dry eyes, change in smell, allergic reaction.  Notify your provider if you have any concerns or experience these symptoms.     EUSTACHIAN TUBE INSTRUCTIONS:  Nasal valsalva:  Pinch nose and with closed mouth try to "pop" air into ears through the back of the nose. Attempt this several times a day. The more popping you have, the more likely the fluid will resolve.     Ponaris Nasal Emollient is used for the relief of: nasal congestion due to colds, nasal irritation, allergy exacerbations, nasal crusting. Specifically prepared iodized organic oils of pine, eucalyptus, peppermint, cajeput, and cottonseed. To order Ponaris: ask your pharmacist to order it for you or we carry it in our pharmacy downstairs on the first floor.       "

## 2020-08-07 ENCOUNTER — TELEPHONE (OUTPATIENT)
Dept: AUDIOLOGY | Facility: CLINIC | Age: 65
End: 2020-08-07

## 2020-08-07 NOTE — TELEPHONE ENCOUNTER
----- Message from Marimar Jimenez NP sent at 8/6/2020  9:33 AM CDT -----  Patient will need audiogram & VNG; however I would like to give him 4 weeks to clear his left middle ear fluid. Would you please reach out to patient to schedule audiogram and VNG approximately 4 weeks from now?  Thank you.

## 2020-08-14 ENCOUNTER — OFFICE VISIT (OUTPATIENT)
Dept: FAMILY MEDICINE | Facility: CLINIC | Age: 65
End: 2020-08-14
Payer: COMMERCIAL

## 2020-08-14 ENCOUNTER — HOSPITAL ENCOUNTER (OUTPATIENT)
Dept: RADIOLOGY | Facility: HOSPITAL | Age: 65
Discharge: HOME | End: 2020-08-14
Attending: INTERNAL MEDICINE
Payer: COMMERCIAL

## 2020-08-14 VITALS
OXYGEN SATURATION: 97 % | HEART RATE: 90 BPM | HEIGHT: 69 IN | DIASTOLIC BLOOD PRESSURE: 86 MMHG | SYSTOLIC BLOOD PRESSURE: 130 MMHG | TEMPERATURE: 99 F | WEIGHT: 179.25 LBS | BODY MASS INDEX: 26.55 KG/M2

## 2020-08-14 DIAGNOSIS — Z12.11 COLON CANCER SCREENING: ICD-10-CM

## 2020-08-14 DIAGNOSIS — E78.2 MIXED HYPERLIPIDEMIA: ICD-10-CM

## 2020-08-14 DIAGNOSIS — I63.542 CEREBROVASCULAR ACCIDENT (CVA) DUE TO OCCLUSION OF LEFT CEREBELLAR ARTERY: ICD-10-CM

## 2020-08-14 DIAGNOSIS — I10 ESSENTIAL HYPERTENSION: ICD-10-CM

## 2020-08-14 DIAGNOSIS — R29.6 FREQUENT FALLS: ICD-10-CM

## 2020-08-14 DIAGNOSIS — H93.13 TINNITUS OF BOTH EARS: ICD-10-CM

## 2020-08-14 DIAGNOSIS — M25.511 ACUTE PAIN OF RIGHT SHOULDER: ICD-10-CM

## 2020-08-14 DIAGNOSIS — M25.511 ACUTE PAIN OF RIGHT SHOULDER: Primary | ICD-10-CM

## 2020-08-14 PROCEDURE — 99999 PR PBB SHADOW E&M-EST. PATIENT-LVL III: CPT | Mod: PBBFAC,,, | Performed by: INTERNAL MEDICINE

## 2020-08-14 PROCEDURE — 99999 PR PBB SHADOW E&M-EST. PATIENT-LVL III: ICD-10-PCS | Mod: PBBFAC,,, | Performed by: INTERNAL MEDICINE

## 2020-08-14 PROCEDURE — 73030 X-RAY EXAM OF SHOULDER: CPT | Mod: TC,FY,PO,RT

## 2020-08-14 PROCEDURE — 99214 PR OFFICE/OUTPT VISIT, EST, LEVL IV, 30-39 MIN: ICD-10-PCS | Mod: S$GLB,,, | Performed by: INTERNAL MEDICINE

## 2020-08-14 PROCEDURE — 73030 XR SHOULDER COMPLETE 2 OR MORE VIEWS RIGHT: ICD-10-PCS | Mod: 26,RT,, | Performed by: RADIOLOGY

## 2020-08-14 PROCEDURE — 99214 OFFICE O/P EST MOD 30 MIN: CPT | Mod: S$GLB,,, | Performed by: INTERNAL MEDICINE

## 2020-08-14 PROCEDURE — 73030 X-RAY EXAM OF SHOULDER: CPT | Mod: 26,RT,, | Performed by: RADIOLOGY

## 2020-08-14 NOTE — PROGRESS NOTES
Patient ID: Edy Bartlett III     Chief Complaint:   Chief Complaint   Patient presents with    Hypertension        HPI: Here for paperwork for disability to state that he cannot work due to after effects of his CVA. Physical Therapy is helping return some strength. Tinnitus remains and he will see ENT / audiology for a possible workup for Meniere's disease. Still getting dizzy that could be a combo of dehydration and hypertension overmedication- encouraged to drink more water. He had a fall at home 3 weeks ago after spinning around and landed on his Right Upper Extremity - now has pain in Right shoulder resembling a rotator cuff strain. Cramps in jaw and neck have slightly improved.     Review of Systems   Constitutional: Negative.    HENT: Negative.    Eyes: Negative.    Respiratory: Negative.    Cardiovascular: Negative.    Gastrointestinal: Negative.    Endocrine: Negative.    Genitourinary: Negative.    Musculoskeletal: Positive for arthralgias.   Skin: Negative.    Allergic/Immunologic: Negative.    Neurological: Positive for dizziness.   Hematological: Negative.    Psychiatric/Behavioral: Negative.           Objective:      Physical Exam   Physical Exam  Vitals signs and nursing note reviewed.   Constitutional:       Appearance: Normal appearance. He is well-developed.   HENT:      Head: Normocephalic and atraumatic.      Nose: Nose normal.      Mouth/Throat:      Mouth: Mucous membranes are moist.   Eyes:      Extraocular Movements: Extraocular movements intact.      Conjunctiva/sclera: Conjunctivae normal.      Pupils: Pupils are equal, round, and reactive to light.   Neck:      Musculoskeletal: Normal range of motion and neck supple.   Cardiovascular:      Rate and Rhythm: Normal rate and regular rhythm.      Pulses: Normal pulses.      Heart sounds: Normal heart sounds.   Pulmonary:      Effort: Pulmonary effort is normal.      Breath sounds: Normal breath sounds.   Abdominal:      General: Bowel sounds  are normal.      Palpations: Abdomen is soft.   Musculoskeletal:      Comments: Decreased Range of Motion in Right Upper Extremity when raising it up    Skin:     General: Skin is warm and dry.      Capillary Refill: Capillary refill takes less than 2 seconds.   Neurological:      Mental Status: He is alert and oriented to person, place, and time.      Comments: Right Upper Extremity weakness    Psychiatric:         Behavior: Behavior normal.         Thought Content: Thought content normal.         Judgment: Judgment normal.       Current Outpatient Medications:     aspirin (ECOTRIN) 81 MG EC tablet, Take 1 tablet (81 mg total) by mouth once daily., Disp: 90 tablet, Rfl: 3    azelastine (ASTELIN) 137 mcg (0.1 %) nasal spray, 1 spray (137 mcg total) by Nasal route 2 (two) times daily., Disp: 30 mL, Rfl: 12    clopidogreL (PLAVIX) 75 mg tablet, Take 1 tablet (75 mg total) by mouth once daily., Disp: 90 tablet, Rfl: 3    cyanocobalamin (VITAMIN B-12) 1000 MCG tablet, Take 1 tablet (1,000 mcg total) by mouth once daily., Disp: 90 tablet, Rfl: 3    FOLBEE 2.5-25-1 mg Tab, Take 1 tablet by mouth once daily., Disp: 90 tablet, Rfl: 3    gemfibroziL (LOPID) 600 MG tablet, TAKE 1 TABLET (600 MG TOTAL) BY MOUTH 2 (TWO) TIMES DAILY BEFORE MEALS, Disp: 60 tablet, Rfl: 0    ibuprofen (ADVIL,MOTRIN) 200 MG tablet, Take by mouth every 6 (six) hours as needed., Disp: , Rfl:     lisinopriL 10 MG tablet, Take 1 tablet (10 mg total) by mouth once daily., Disp: 90 tablet, Rfl: 3    triamcinolone acetonide 0.1% (KENALOG) 0.1 % cream, Apply topically., Disp: , Rfl:     pantoprazole (PROTONIX) 40 MG tablet, Take 40 mg by mouth., Disp: , Rfl:     tiZANidine (ZANAFLEX) 4 MG tablet, TAKE 1 TABLET (4 MG TOTAL) BY MOUTH EVERY 8 (EIGHT) HOURS. (Patient not taking: Reported on 8/14/2020), Disp: 30 tablet, Rfl: 0         Vitals:   Vitals:    08/14/20 0819   BP: 130/86   Pulse: 90   Temp: 98.7 °F (37.1 °C)   TempSrc: Oral   SpO2: 97%  "  Weight: 81.3 kg (179 lb 3.7 oz)   Height: 5' 9" (1.753 m)      Assessment:       Patient Active Problem List    Diagnosis Date Noted    Frequent falls 08/14/2020    Cerebrovascular accident (CVA) due to occlusion of left cerebellar artery 05/08/2020    Cervicalgia 05/08/2020    Upper airway cough syndrome 03/27/2020    Degenerative disc disease, cervical 03/27/2020    B12 deficiency 03/03/2020    Folate deficiency 03/03/2020    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    Diagnoses and all orders for this visit:    Edy was seen today for hypertension.    Diagnoses and all orders for this visit:    Acute pain of right shoulder  -     X-ray Shoulder 2 or More Views Right; Future  Continue Physical Therapy     Colon cancer screening  -     Fecal Immunochemical Test (iFOBT); Future    Mixed hyperlipidemia  -     Lipid Panel; Future  -     CBC auto differential; Future  -     Comprehensive metabolic panel; Future    Tinnitus of both ears  Per ENT     Cerebrovascular accident (CVA) due to occlusion of left cerebellar artery  Cont meds and Physical Therapy     Essential hypertension  Controlled     Frequent falls  Due to dizziness- drink more water daily                 "

## 2020-08-19 ENCOUNTER — TELEPHONE (OUTPATIENT)
Dept: FAMILY MEDICINE | Facility: CLINIC | Age: 65
End: 2020-08-19

## 2020-08-19 DIAGNOSIS — E78.2 MIXED HYPERLIPIDEMIA: Primary | ICD-10-CM

## 2020-08-19 NOTE — TELEPHONE ENCOUNTER
Spoke with patient. He stated he received a call yesterday that his injection for cholesterol was sent to his pharmacy. I do not see any calls or new medication. Patient stated Dr Cordova had talked to him about this during his visit. Please advise.

## 2020-08-19 NOTE — TELEPHONE ENCOUNTER
----- Message from Raulito Louise sent at 8/19/2020 10:15 AM CDT -----  Regarding: pt  Type: Needs Medical Advice    Who Called:  pt    Best Call Back Number: [phone]  Additional Information: pt checking on Rx that was to be filled. Has questions. Please call.

## 2020-08-20 ENCOUNTER — TELEPHONE (OUTPATIENT)
Dept: FAMILY MEDICINE | Facility: CLINIC | Age: 65
End: 2020-08-20

## 2020-08-20 NOTE — TELEPHONE ENCOUNTER
Notes recorded by Zina Riley MA on 8/18/2020 at 1:38 PM CDT  Spoke with patient regarding test results. He would like to try the Repatha injections.  Notes recorded by Huy Cordova MD on 8/16/2020 at 12:50 PM CDT  Majority of abs are good, but lipids still very high. I'd like to try to get Repatha which is a shot to lower it since he can't tolerate statins.        Found this don't know if this help with this encounter.

## 2020-08-20 NOTE — TELEPHONE ENCOUNTER
repatha was discussed in his lab results, you mentioned you would like to order it and patient agreed      Spoke with patient regarding test results. He would like to try the Repatha injections.    Huy Cordova MD   8/16/2020 12:50 PM      Majority of abs are good, but lipids still very high. I'd like to try to get Repatha which is a shot to lower it since he can't tolerate statins.

## 2020-08-21 ENCOUNTER — TELEPHONE (OUTPATIENT)
Dept: PHARMACY | Facility: CLINIC | Age: 65
End: 2020-08-21

## 2020-08-21 NOTE — TELEPHONE ENCOUNTER
Spoke with patient. Informed patient that Philomenaulent was sent to [facility] for approval. His insurance would not cover the Repatha. Patient aware and understands.

## 2020-08-25 NOTE — TELEPHONE ENCOUNTER
DOCUMENTATION ONLY:  Prior authorization for PRALUENT approved from 8/24/2020 to 2/24/2021.    Case ID# NONE GIVEN    Co-pay: $100.00    Patient Assistance IS required.    Forwarding to FA for review. -HBR

## 2020-08-26 NOTE — TELEPHONE ENCOUNTER
Reached patient for initial Praluent fill. Verified to ship on  for delivery on . He will inject once received. Copay $0 at 004 with authorization to charge CC. Shipping address confirmed. Patient declined consult, stating his son is on the same medication and experienced with self-injections. Sent him the link to the injection video via email. Discussed [facility] shipping procedures, refill calls, and supplies sent. No questions or concerns at this time. Confirmed two patient identifiers - name and .    Osei Mosley, PharmD  Clinical Pharmacist   [facility]   P: [phone]

## 2020-09-04 NOTE — PROGRESS NOTES
Subjective     Edy Bartlett III is a 64 y.o. old, male here for Pre-operative clearance.    Patient is here for preoperative clearance.  He is scheduled to have a left total knee replacement.  He is a 64-year-old with past medical history of hypertension, mixed hyperlipidemia, BPH, ED, OA, degenerative disc disease with cervical spinal fusion, and nasopharyngeal cancer status post radiation.    Hypertension:  Long history, he is compliant with lisinopril daily.    Hyperlipidemia:  He has been on statins at at least 2 different points in his life.  They make him incredibly fatigued and ends up stopping them.    He has no history of diabetes, coronary artery disease, CVA, CKD.        Review of Systems   Constitutional: Negative for chills and fever.   HENT: Positive for hearing loss and tinnitus.    Eyes: Negative for blurred vision and pain.   Respiratory: Negative for cough and shortness of breath.    Cardiovascular: Negative for chest pain and palpitations.   Gastrointestinal: Negative for abdominal pain and nausea.   Genitourinary: Negative for dysuria and frequency.   Musculoskeletal: Positive for back pain, joint pain and neck pain. Negative for myalgias.   Skin: Negative for itching and rash.   Neurological: Positive for tingling. Negative for weakness and headaches.   Endo/Heme/Allergies: Negative for environmental allergies. Does not bruise/bleed easily.   Psychiatric/Behavioral: Negative for depression.        Insomnia when taking tramadol       Past Medical History:   Diagnosis Date    BPH (benign prostatic hypertrophy) with urinary obstruction     Cancer     Complication of anesthesia     gets very sore neck post-op if his head is twisted or bent too far with intubation    DDD (degenerative disc disease), cervical     DDD (degenerative disc disease), lumbar     DJD (degenerative joint disease) of knee 12/10/2012    ED (erectile dysfunction)     Elevated PSA     Hyperlipidemia     Hypertension      Mass of skin 2012    left temple, possible lipoma    Neuropathy     feet    OA (osteoarthritis of spine)     Tattoo Dec 2012    left upper arm     Past Surgical History:   Procedure Laterality Date    APPENDECTOMY      ARTHROSCOPY, KNEE Left 12/18/2012    [facility]    BIOPSY-NASAL SINUS NASOPHARYNGEAL N/A 3/20/2015    Performed by Rachel Dubon MD at [facility] OR    CARPAL TUNNEL RELEASE      both hands/ lt hand x 2/ rt 1    CERVICAL SPINE SURGERY      x 2;fusion C3-7, can only bend his neck a little    EPIDURAL BLOCK INJECTION      injection for pain    FRACTURE SURGERY      ankle    hematoma removed  2006    thigh    HERNIA REPAIR      R inguinal    KNEE ARTHROSCOPY      PROSTATE BIOPSY      TENDON REPAIR      TRIGGER FINGER RELEASE  2007    left    TUMOR EXCISION      fatty tumor to left front of head, has since grown back     Review of patient's allergies indicates:  No Known Allergies  No outpatient medications have been marked as taking for the 8/26/19 encounter (Office Visit) with Reddy Vidal MD.     Social History     Socioeconomic History    Marital status:      Spouse name: Not on file    Number of children: Not on file    Years of education: Not on file    Highest education level: Not on file   Occupational History     Employer:  [employer]   Social Needs    Financial resource strain: Not on file    Food insecurity:     Worry: Not on file     Inability: Not on file    Transportation needs:     Medical: Not on file     Non-medical: Not on file   Tobacco Use    Smoking status: Never Smoker    Smokeless tobacco: Never Used   Substance and Sexual Activity    Alcohol use: No    Drug use: No    Sexual activity: Not on file   Lifestyle    Physical activity:     Days per week: Not on file     Minutes per session: Not on file    Stress: Not on file   Relationships    Social connections:     Talks on phone: Not on file     Gets together:  Not on file     Attends Spiritism service: Not on file     Active member of club or organization: Not on file     Attends meetings of clubs or organizations: Not on file     Relationship status: Not on file   Other Topics Concern    Not on file   Social History Narrative    Not on file     Family History   Problem Relation Age of Onset    Hyperlipidemia Mother     Heart disease Mother     Diabetes Son     Diabetes type II Maternal Aunt     Prostate cancer Brother      Objective     /78 (Patient Position: Sitting)   Pulse 93   Wt 84.2 kg (185 lb 10 oz)   SpO2 97%   BMI 27.41 kg/m²   Physical Exam   Constitutional: He is oriented to person, place, and time. He appears well-developed. No distress.   Cardiovascular: Normal rate, regular rhythm and intact distal pulses.   No murmur heard.  Pulmonary/Chest: Effort normal and breath sounds normal. No respiratory distress.   Neurological: He is alert and oriented to person, place, and time.     Assessment and Plan     1. Preop examination  Patient is low risk for cardiovascular perioperative events according to the revised cardiac risk index.  If the benefits to the patient outweigh the risk, and the surgeon agrees patient can proceed with surgery. His chronic medical problems are optimized prior to surgery. No further testing is required at this time.    2. Essential hypertension  Well controlled.    3. Mixed hyperlipidemia  Consider statin alternatives in the future.    4. Benign prostatic hyperplasia with urinary obstruction    Pt agrees to f/u Healthcare Maintenance after surgery.    ___________________  Reddy Vidal MD  Internal Medicine and Pediatrics   56yo male with medical h/o HTN, GERD, nd Lumbar stenosis and presents tody for PST for Decompression Laminectomy L2-5, Posterior Spinal Fusion L3-5 scheduled for 9/10/2020

## 2020-09-10 ENCOUNTER — CLINICAL SUPPORT (OUTPATIENT)
Dept: AUDIOLOGY | Facility: CLINIC | Age: 65
End: 2020-09-10
Payer: COMMERCIAL

## 2020-09-10 ENCOUNTER — TELEPHONE (OUTPATIENT)
Dept: FAMILY MEDICINE | Facility: CLINIC | Age: 65
End: 2020-09-10

## 2020-09-10 DIAGNOSIS — H90.A21 SENSORINEURAL HEARING LOSS (SNHL) OF RIGHT EAR WITH RESTRICTED HEARING OF LEFT EAR: ICD-10-CM

## 2020-09-10 DIAGNOSIS — H90.A32 MIXED CONDUCTIVE AND SENSORINEURAL HEARING LOSS OF LEFT EAR WITH RESTRICTED HEARING OF RIGHT EAR: ICD-10-CM

## 2020-09-10 DIAGNOSIS — R42 DIZZINESS: Primary | ICD-10-CM

## 2020-09-10 DIAGNOSIS — R42 VERTIGO: ICD-10-CM

## 2020-09-10 DIAGNOSIS — M54.2 CERVICALGIA: Primary | ICD-10-CM

## 2020-09-10 DIAGNOSIS — H93.12 TINNITUS, LEFT: Primary | ICD-10-CM

## 2020-09-10 PROCEDURE — 92540 PR VESTIBULAR EVAL NYSTAG FOVL&PERPH STIM OSCIL TRACKING: ICD-10-PCS | Mod: S$GLB,,, | Performed by: AUDIOLOGIST

## 2020-09-10 PROCEDURE — 92557 COMPREHENSIVE HEARING TEST: CPT | Mod: S$GLB,,, | Performed by: AUDIOLOGIST-HEARING AID FITTER

## 2020-09-10 PROCEDURE — 92540 BASIC VESTIBULAR EVALUATION: CPT | Mod: S$GLB,,, | Performed by: AUDIOLOGIST

## 2020-09-10 PROCEDURE — 92557 PR COMPREHENSIVE HEARING TEST: ICD-10-PCS | Mod: S$GLB,,, | Performed by: AUDIOLOGIST-HEARING AID FITTER

## 2020-09-10 PROCEDURE — 92567 TYMPANOMETRY: CPT | Mod: S$GLB,,, | Performed by: AUDIOLOGIST-HEARING AID FITTER

## 2020-09-10 PROCEDURE — 92567 PR TYMPA2METRY: ICD-10-PCS | Mod: S$GLB,,, | Performed by: AUDIOLOGIST-HEARING AID FITTER

## 2020-09-10 NOTE — TELEPHONE ENCOUNTER
----- Message from Lavinia Palma NP sent at 8/31/2020  6:46 PM CDT -----  He does need more shoulder rehab.  He has been going MWF.  But he needs to go to see Dr. Tom Hadry (? He didn't know how to spell his last name) about his back pain.  Has 3 lumbar discs causing pain so bad he can't walk and he missed therapy 2x last week and won't be able to go until he gets another epidural.  He is interested in transferring his chronic pain care to [facility], if you wouldn't mind putting in a referral for future care with Dr. Parham's group in [place].  (He would go now but he can't wait for the length of time a new patient appointment takes.)  [facility] [place]

## 2020-09-10 NOTE — Clinical Note
Hearing testing reveals decreased hearing thresholds in the high freq range AU and continued mixed HL AS when compared to previous hearing testing from 2015. He continues to have a type B tymp AS.

## 2020-09-10 NOTE — Clinical Note
Impressions:  Incomplete VNG (could not test calorics due to presence of fluid in left middle ear).   Negative for BPPV bilaterally.      Recommendations:  1. ENT review of results  2. Follow-up with ENT for further treatment of OME  3. Reschedule caloric testing once OME is resolved   (Note: If a PE tube is placed to treat OME of the left ear then a true caloric asymmetry or weakness cannot be ruled out.  Only the presence/absence of a caloric response can be determined.)

## 2020-09-10 NOTE — PROGRESS NOTES
Edy Bartlett III was seen 09/10/2020 for an audiological evaluation. Patient complains of hearing loss, dizziness and tinnitus AS. Pt reports a significant Hx of loud occupational and recreational noise exposure. He denies family Hx of HL. He has a Hx of ear infections AS and previous PE tube AS. He is being followed medically for asymmetrical HL.     Results reveal a normal through 1.5K Hz sloping to severe HF sensorineural hearing loss for the right ear, and  mild-to-profound mixed hearing loss for the left ear.    Speech Reception Thresholds were  10 dBHL for the right ear and 25 dBHL for the left ear.    Word recognition scores were good for the right ear and good for the left ear.   Tympanograms were Type A for the right ear and unable to obtain for the left ear. Significant changes were noted in the high freq range AU when compared to previous hearing testing from 10/14/15.     Audiogram results were reviewed in detail with patient and all questions were answered. Results will be reviewed by ENT at the completion of this note. Recommend binaural amplification pending medical clearance, hearing test in one year due to noise exposure and hearing protection in loud noise. Pt was seen immediately following this encounter for a VNG.

## 2020-09-10 NOTE — PROGRESS NOTES
Referring provider: Marimar Jimenez NP    Edy Bartlett was seen 9/10/20 for Videonystagmography (VNG) testing.      VAT was negative bilaterally.      VNG Results (abnormal results italicized):   Oculomotor function tests:   Sinusoidal tracking - abnormal but symmetric (noted saccadic eye movements at higher frequencies)   Saccades - WNL   OPKs - normal and symmetric  Spontaneous nystagmus was absent.  Gaze nystagmus was absent.  Hawa-Hallpike Left was negative for BPPV but pt did report lightheadedness (not dizziness/vertigo) upon sitting up.   Nitro-Hallpike Right was negative for BPPV but pt did report lightheadedness (not dizziness/vertigo) upon sitting up.   Static Positional nystagmus was absent.    Bi-thermal caloric testing was not done due to the continue presence of OME in the patient's left ear.  Pt will be scheduled with ENT for treatment of this and caloric testing will be rescheduled once the OME has cleared.      Impressions:  Incomplete VNG (could not test calorics due to presence of fluid in left middle ear).   Negative for BPPV bilaterally.      Recommendations:  1. ENT review of results  2. Follow-up with ENT for further treatment of OME  3. Reschedule caloric testing once OME is resolved   (Note: If a PE tube is placed to treat OME of the left ear then a true caloric asymmetry or weakness cannot be ruled out.  Only the presence/absence of a caloric response can be determined.)      Tracings will be scanned into the patient's chart.

## 2020-09-21 ENCOUNTER — PATIENT OUTREACH (OUTPATIENT)
Dept: ADMINISTRATIVE | Facility: OTHER | Age: 65
End: 2020-09-21

## 2020-09-21 ENCOUNTER — TELEPHONE (OUTPATIENT)
Dept: OTOLARYNGOLOGY | Facility: CLINIC | Age: 65
End: 2020-09-21

## 2020-09-21 NOTE — TELEPHONE ENCOUNTER
----- Message from Debora Fitzpatrick sent at 9/18/2020  1:01 PM CDT -----  Contact: kimmy Minaya's note from 09/11/2020 said: if the fluid behind your left ear drum does not clear up soon, we may need you to see Dr. Naveed Cohen to determine whether you need a tiny drainage tube placed in your left ear drum. I recommend an ear recheck appt with either myself or Dr. Cohen this month.     The patient did not read this message on [facility].     ----- Message -----  From: Verito Calhoun  Sent: 9/18/2020  12:00 PM CDT  To: [facility] Audio Clinical Staff    Type: Needs Medical Advice  Who Called:  patient     Best Call Back Number: [phone]  Additional Information: caller states at his appointment on 09/10/2020 he was advise he would be contact by a specialist to remove the fluid from his ear, he states no one has contacted him, please contact to advise.

## 2020-09-22 ENCOUNTER — OFFICE VISIT (OUTPATIENT)
Dept: OTOLARYNGOLOGY | Facility: CLINIC | Age: 65
End: 2020-09-22
Payer: COMMERCIAL

## 2020-09-22 VITALS — TEMPERATURE: 97 F | BODY MASS INDEX: 26.73 KG/M2 | WEIGHT: 181 LBS

## 2020-09-22 DIAGNOSIS — C11.9 NASOPHARYNX CANCER: ICD-10-CM

## 2020-09-22 DIAGNOSIS — T66.XXXS LATE EFFECT OF RADIATION: ICD-10-CM

## 2020-09-22 DIAGNOSIS — H69.92 ETD (EUSTACHIAN TUBE DYSFUNCTION), LEFT: Primary | ICD-10-CM

## 2020-09-22 PROCEDURE — 99999 PR PBB SHADOW E&M-EST. PATIENT-LVL III: CPT | Mod: PBBFAC,,, | Performed by: OTOLARYNGOLOGY

## 2020-09-22 PROCEDURE — 69433 CREATE EARDRUM OPENING: CPT | Mod: LT,S$GLB,, | Performed by: OTOLARYNGOLOGY

## 2020-09-22 PROCEDURE — 99999 PR PBB SHADOW E&M-EST. PATIENT-LVL III: ICD-10-PCS | Mod: PBBFAC,,, | Performed by: OTOLARYNGOLOGY

## 2020-09-22 PROCEDURE — 69433 PR CREATE EARDRUM OPENING,LOCAL ANESTH: ICD-10-PCS | Mod: LT,S$GLB,, | Performed by: OTOLARYNGOLOGY

## 2020-09-22 PROCEDURE — 99213 OFFICE O/P EST LOW 20 MIN: CPT | Mod: 25,S$GLB,, | Performed by: OTOLARYNGOLOGY

## 2020-09-22 PROCEDURE — 99213 PR OFFICE/OUTPT VISIT, EST, LEVL III, 20-29 MIN: ICD-10-PCS | Mod: 25,S$GLB,, | Performed by: OTOLARYNGOLOGY

## 2020-09-22 RX ORDER — OFLOXACIN 3 MG/ML
5 SOLUTION AURICULAR (OTIC) 2 TIMES DAILY
Qty: 10 ML | Refills: 3 | Status: SHIPPED | OUTPATIENT
Start: 2020-09-22 | End: 2020-09-27

## 2020-09-22 NOTE — PROGRESS NOTES
Health Maintenance Due   Topic Date Due    TETANUS VACCINE  08/16/1973    Shingles Vaccine (1 of 2) 08/16/2006    Colorectal Cancer Screening  08/16/2006    Influenza Vaccine (1) 08/01/2020     Updates were requested from care everywhere.  Chart was reviewed for overdue Proactive [facility] Encounters (ANTHON[facility]) topics (CRS, Breast Cancer Screening, Eye exam)  Health Maintenance has been updated.  LINKS immunization registry triggered.  Immunizations were reconciled.

## 2020-09-22 NOTE — PROGRESS NOTES
Subjective:       Patient ID: Edy Bartlett III is a 65 y.o. male.    Chief Complaint: Ear Fullness    Edy is here for follow-up of L ETD.     History of T1N0 Nasopharynx ca s/p Surgery, CRT. Has been CINDY. He has persistent ear issues on the L 2/2 treatment. Has had tube in the past.     He also has been having issues with dizziness. Had VNG with no evidence of vestibular cause.     Review of Systems   Constitutional: Negative for activity change and appetite change.   Respiratory: Negative for difficulty breathing and wheezing   Cardiovascular: Negative for chest pain.      Objective:        Constitutional:   Vital signs are normal. He appears well-developed and well-nourished.     Head:  Normocephalic and atraumatic.     Ears:  Right ear hearing normal to normal and whispered voice; external ear normal without scars, lesions, or masses; ear canal, tympanic membrane, and middle ear normal..   Left Ear: Tympanic membrane is scarred and retracted.     Nose:  Nose normal including turbinates, nasal mucosa, sinuses and nasal septum.     Mouth/Throat  Oropharynx clear and moist without lesions or asymmetry.     Neck:  Neck normal without thyromegaly masses, asymmetry, normal tracheal structure, crepitus, and tenderness.         Tests / Results:  Edy Bartlett III   [id number],   : 1955      Procedure date:2020  Patient's medications, allergies, past medical, surgical, social and family histories were reviewed and updated as appropriate.    Pre-procedure diagnosis: ETD (Eustachian tube dysfunction), left [H69.82]     Procedure: left  tympanostomy, with ventilation tube placement     Procedure in detail: Risks, benefits, and alternatives of the procedure were discussed with the patient, and consent was obtained to perform afor the left ear.  The procedure required a high level of expertise and use of an operating microscope and multiple micro-instruments.     With the patient in the supine position, the  operating microscope was used to examine both ears with the appropriate sized ear speculum.  A variety of sterile, micro-instruments were utilized to remove the cerumen atraumatically.  We applied phenol topically (local analgesic) to the tympanic membrane.  After adequate anesthesia was obtained, the tympanic membrane was incised radially in the appropriate location.  We suctioned the middle ear through the incision. A ventilation tube was placed (position & patency confirmed).  I performed the procedure. The patient tolerated the procedure well and there were no complications.    Findings:       Left ear  : middle ear had scant serous fluid present.      Assessment:       1. ETD (Eustachian tube dysfunction), left    2. Nasopharynx cancer    3. Late effect of radiation          Plan:         Tube placed  Floxin x 5 days  FU 1 mo

## 2020-09-23 ENCOUNTER — TELEPHONE (OUTPATIENT)
Dept: PHARMACY | Facility: CLINIC | Age: 65
End: 2020-09-23

## 2020-09-25 ENCOUNTER — PATIENT MESSAGE (OUTPATIENT)
Dept: OTHER | Facility: OTHER | Age: 65
End: 2020-09-25

## 2020-10-05 ENCOUNTER — TELEPHONE (OUTPATIENT)
Dept: FAMILY MEDICINE | Facility: CLINIC | Age: 65
End: 2020-10-05

## 2020-10-07 ENCOUNTER — IMMUNIZATION (OUTPATIENT)
Dept: PHARMACY | Facility: CLINIC | Age: 65
End: 2020-10-07
Payer: MEDICARE

## 2020-10-07 ENCOUNTER — OFFICE VISIT (OUTPATIENT)
Dept: PAIN MEDICINE | Facility: CLINIC | Age: 65
End: 2020-10-07
Payer: COMMERCIAL

## 2020-10-07 VITALS
SYSTOLIC BLOOD PRESSURE: 117 MMHG | TEMPERATURE: 98 F | HEART RATE: 82 BPM | HEIGHT: 70 IN | DIASTOLIC BLOOD PRESSURE: 65 MMHG | OXYGEN SATURATION: 96 % | WEIGHT: 182.13 LBS | RESPIRATION RATE: 18 BRPM | BODY MASS INDEX: 26.07 KG/M2

## 2020-10-07 DIAGNOSIS — M47.892 OTHER SPONDYLOSIS, CERVICAL REGION: ICD-10-CM

## 2020-10-07 DIAGNOSIS — M51.36 DDD (DEGENERATIVE DISC DISEASE), LUMBAR: Primary | ICD-10-CM

## 2020-10-07 DIAGNOSIS — R26.89 BALANCE DISORDER: ICD-10-CM

## 2020-10-07 DIAGNOSIS — M48.061 SPINAL STENOSIS OF LUMBAR REGION WITHOUT NEUROGENIC CLAUDICATION: ICD-10-CM

## 2020-10-07 DIAGNOSIS — M54.2 CERVICALGIA: ICD-10-CM

## 2020-10-07 DIAGNOSIS — M54.16 BILATERAL LUMBAR RADICULOPATHY: ICD-10-CM

## 2020-10-07 DIAGNOSIS — R42 DIZZINESS: ICD-10-CM

## 2020-10-07 DIAGNOSIS — M50.30 DDD (DEGENERATIVE DISC DISEASE), CERVICAL: ICD-10-CM

## 2020-10-07 DIAGNOSIS — M54.12 RADICULOPATHY, CERVICAL REGION: ICD-10-CM

## 2020-10-07 DIAGNOSIS — Z02.89 PAIN MANAGEMENT CONTRACT SIGNED: ICD-10-CM

## 2020-10-07 DIAGNOSIS — M47.812 CERVICAL SPONDYLOSIS: ICD-10-CM

## 2020-10-07 DIAGNOSIS — M54.9 DORSALGIA, UNSPECIFIED: ICD-10-CM

## 2020-10-07 DIAGNOSIS — Z79.891 OPIOID USE AGREEMENT EXISTS: ICD-10-CM

## 2020-10-07 DIAGNOSIS — M54.12 CERVICAL RADICULOPATHY: ICD-10-CM

## 2020-10-07 PROCEDURE — 80307 DRUG TEST PRSMV CHEM ANLYZR: CPT

## 2020-10-07 PROCEDURE — 99204 PR OFFICE/OUTPT VISIT, NEW, LEVL IV, 45-59 MIN: ICD-10-PCS | Mod: S$GLB,,, | Performed by: ANESTHESIOLOGY

## 2020-10-07 PROCEDURE — 99999 PR PBB SHADOW E&M-EST. PATIENT-LVL V: CPT | Mod: PBBFAC,,, | Performed by: ANESTHESIOLOGY

## 2020-10-07 PROCEDURE — 99999 PR PBB SHADOW E&M-EST. PATIENT-LVL V: ICD-10-PCS | Mod: PBBFAC,,, | Performed by: ANESTHESIOLOGY

## 2020-10-07 PROCEDURE — 99204 OFFICE O/P NEW MOD 45 MIN: CPT | Mod: S$GLB,,, | Performed by: ANESTHESIOLOGY

## 2020-10-07 RX ORDER — TRAMADOL HYDROCHLORIDE 50 MG/1
50 TABLET ORAL 3 TIMES DAILY PRN
Qty: 30 TABLET | Refills: 0 | Status: SHIPPED | OUTPATIENT
Start: 2020-10-07 | End: 2020-12-29

## 2020-10-07 NOTE — PROGRESS NOTES
This note was completed with dictation software and grammatical errors may exist.    CC:  Neck pain, back pain radiating into the legs    HPI:  The patient is a 65-year-old man with a history of CVA, hypertension, cervical and lumbar DDD who presents in referral from Dr. Cordova for neck pain and back pain radiating into the legs.  Patient reports having chronic neck and back pain, has had 2 cervical fusions with the last being at C6/7 prior to this it was C3 through C6.  He reports that his neck pain is located in the midline, radiates out to the side, has difficulty with lateral rotation, extension has been doing physical therapy which seems to be helping slightly.  He does have balance issues but states that this has occurred ever since his CVA in February, 2020.  It looks like the reports mention a left cerebellar artery occlusion causing the stroke, he initially had right-sided deficits which resolved in less than 24 hours but reports having dizziness and balance issues since then.  He reports that his neck pain radiates into his shoulders, denies any major numbness or tingling in the hands right now, no weakness.    In terms of his low back pain, he has had lumbar DDD for many years, has been treated by Dr. Ang with radiofrequency ablations and epidural steroid injections which have helped to a large degree.  He states that it seems every year he gets a flare up.  He states that his last radiofrequency ablation was in July, 2019 after which she was doing well.  He states that 3 weeks ago he twisted and turned, bent over and had sudden severe pain in the back which has radiated into his bilateral legs.  The leg pain is not constant, does not know when it is going to occur.  He does fine with walking but does feel pain in the legs and burning in his feet at night.  He denies any bowel or bladder incontinence.  He is also getting some physical therapy for his low back right now as well.    Pain intervention  history:  He has had multiple epidurals and lumbar radiofrequency ablation in the past.    Antineuropathics:  He thinks he may have tried gabapentin but has poor memory so does not recall what happened with this.  NSAIDs:  He was previously taking large doses of ibuprofen, no longer now that he is on aspirin and Plavix.  Physical therapy:  He is currently enrolled in physical therapy for his right shoulder, neck and low back.  Antidepressants:  Muscle relaxers:  None  Opioids:  He has taken hydrocodone and oxycodone the past, tramadol seem to help as well.  Antiplatelets/Anticoagulants:  Aspirin and Plavix        ROS:  He reports chills, fatigability, ear pain, runny nose, hoarse voice, cough, easy bruising and bleeding, urinary frequency, joint stiffness, back pain, memory loss, dizziness, difficulty sleeping and loss of balance.  Balance of review of systems is negative.    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    Cerebellar stroke     Complication of anesthesia     gets very sore neck post-op if his head is twisted or bent too far with intubation    DDD (degenerative disc disease)     DDD (degenerative disc disease), cervical     DDD (degenerative disc disease), lumbar     DJD (degenerative joint disease) of knee 12/10/2012    ED (erectile dysfunction)     Elevated PSA     Hyperlipidemia     Hypertension     Mass of skin 2012    left temple, possible lipoma    Mobility impaired     cane    Neuropathy     feet    OA (osteoarthritis of spine)        Past Surgical History:   Procedure Laterality Date    APPENDECTOMY      CARPAL TUNNEL RELEASE      both hands/ lt hand  x 2/ rt 1    CERVICAL SPINE SURGERY      x 2;fusion C3-7, can only bend his neck a little    EPIDURAL BLOCK INJECTION      injection for pain    FRACTURE SURGERY      ankle    hematoma removed  2006    thigh    HERNIA REPAIR      R inguinal    KNEE ARTHROSCOPY      PROSTATE BIOPSY      TENDON REPAIR      TRIGGER FINGER RELEASE  2007    left    TUMOR EXCISION      fatty tumor to left front of head, has since grown back    UNICOMPARTMENTAL ARTHROPLASTY OF KNEE Left 9/10/2019    Procedure: ARTHROPLASTY, KNEE, UNICOMPARTMENTAL;  Surgeon: Nelson Silva MD;  Location: [facility];  Service: Orthopedics;  Laterality: Left;    urolift         Social History     Socioeconomic History    Marital status:      Spouse name: Not on file    Number of children: Not on file    Years of education: Not on file    Highest education level: Not on file   Occupational History     Employer:  [employer]   Social Needs    Financial resource strain: Not on file    Food insecurity     Worry: Not on file     Inability: Not on file    Transportation needs     Medical: Not on file     Non-medical: Not on file   Tobacco Use    Smoking status: Never Smoker    Smokeless tobacco: Never Used   Substance and Sexual Activity    Alcohol use: No    Drug use: No    Sexual activity: Not Currently   Lifestyle    Physical activity     Days per week: Not on file     Minutes per session: Not on file    Stress: Not on file   Relationships    Social connections     Talks on phone: Not on file     Gets together: Not on file     Attends Hindu service: Not on file     Active member of club or organization: Not on file     Attends meetings of clubs or organizations: Not on file     Relationship status: Not on file   Other Topics Concern    Not on file   Social History Narrative    Not on file         Medications/Allergies: See med card    Vitals:    10/07/20 0751   BP: 117/65   Pulse: 82   Resp: 18   Temp: 98.4 °F (36.9 °C)  "  TempSrc: Temporal   SpO2: 96%   Weight: 82.6 kg (182 lb 1.6 oz)   Height: 5' 10" (1.778 m)   PainSc:   6   PainLoc: Back         Physical exam:  Gen: A and O x3, pleasant, well-groomed  Skin: No rashes or obvious lesions  HEENT: PERRLA, no obvious deformities on ears or in canals. Trachea midline.  CVS: Regular rate and rhythm, normal palpable pulses.  Resp:No increased work of breathing, symmetrical chest rise.  Abdomen: Soft, NT/ND.  Musculoskeletal:  Wide-based gait, has difficulty moving from sitting to standing, slightly unsteady.  Neuro:  Lower extremities: 5/5 strength bilaterally, right foot unable to dorsiflex related to previous fractures  Reflexes: Patellar 2+, Achilles 0+ bilaterally.  Sensory:  Intact and symmetrical to light touch and pinprick in L2-S1 dermatomes bilaterally.    Lumbar spine:  Lumbar spine:  Range of motion is full with flexion with no increased pain, moderately reduced with extension with increased pressure on both sides, especially with oblique extension either side.    Ramon's test causes no increased pain on either side.    Supine straight leg raise is negative bilaterally.    Internal and external rotation of the hip causes no increased pain on either side.  Myofascial exam: No tenderness to palpation across lumbar paraspinous muscles.    Neuro:  Upper extremities: 5/5 strength bilaterally   Reflexes: Brachioradialis 1+, Bicep 0+, Tricep 0+.   Sensory: Intact and symmetrical to light touch and pinprick in C2-T1 dermatomes bilaterally.  Imbalance with Burrell's, unable to tandem gait, negative Burrell's    Cervical Spine:  Cervical spine:  Range of motion is mildly reduced with forward flexion with no increased pain, moderately reduced with extension and oblique extension either side causing increased pain in ipsilateral neck.    Spurling's maneuver causes ipsilateral neck pain.    Myofascial exam: No Tenderness to palpation across cervical paraspinous region " bilaterally.      Imagin11 MRI L-spine:  1.  MILD L1-2 DISC BULGE.     2.  RIGHT PARACENTRAL DISC PROTRUSION AT THE L3-L4 LEVEL CAUSING MODERATE PARACENTRAL THECAL COMPRESSION.   3.  BROAD BASED L4-5 DISC EXTRUSION CAUSING MODERATELY SEVERE THECAL SAC COMPRESSION AND MILD RIGHT FORAMINAL STENOSIS.     4.  L5-S1 DISC BULGE CAUSING NO THECAL SAC COMPRESSION OR FORAMINAL COMPROMISE.       Assessment:  The patient is a 65-year-old man with a history of CVA, hypertension, cervical and lumbar DDD who presents in referral from Dr. Cordova for neck pain and back pain radiating into the legs.    1. DDD (degenerative disc disease), lumbar  MRI Lumbar Spine Without Contrast   2. Cervicalgia  Ambulatory referral/consult to Pain Clinic   3. Dorsalgia, unspecified  MRI Lumbar Spine Without Contrast   4. Bilateral lumbar radiculopathy  MRI Lumbar Spine Without Contrast   5. Spinal stenosis of lumbar region without neurogenic claudication  MRI Lumbar Spine Without Contrast   6. Cervical radiculopathy     7. Cervical spondylosis     8. DDD (degenerative disc disease), cervical  MRI Cervical Spine Without Contrast   9. Other spondylosis, cervical region  MRI Cervical Spine Without Contrast   10. Radiculopathy, cervical region  MRI Cervical Spine Without Contrast   11. Balance disorder     12. Dizziness  Ambulatory referral/consult to Neurology   13. Pain management contract signed  POCT Urine Drug Screen Pain Management    Pain Clinic Drug Screen   14. Opioid use agreement exists           Plan:  1.  For his low back pain radiating into his legs, his previous MRI had showed stenosis, I suspect that he has worsening of stenosis but also severe lumbar spondylosis.  I will get a new MRI of his lumbar spine.  I will also get a new MRI of his cervical spine to evaluate adjacent levels to his fusion to see if he has any further canal narrowing, nerve root impingement or if this is just spondylosis.  2.  For his balance issues, I am  going to refer him to Neurology.  He requested this because he is frustrated that no when he has been able to tell in what is happening with his dizziness and balance.  3.  He requested pain medication, previously he has taken tramadol with some relief, also hydrocodone and oxycodone but I suggested we start with Tramadol.  I have given him a prescription for # 30 tablets, had him sign an opioid agreement and complete a urine drug screen.  4.  I am going to get records from Dr. Ang to see what procedures he had done and what had been successful and what had not been successful.    Thank you for referring this interesting patient, and I look forward to continuing to collaborate in his care.

## 2020-10-07 NOTE — LETTER
October 7, 2020      Huy Cordova MD  [address] [facility] [address] [place] [address]           [facility]  [address] [place] [address]  Phone: [phone]  Fax: [phone]          Patient: Edy Bartlett III   MR Number: [id number]   YOB: 1955   Date of Visit: 10/7/2020       Dear Dr. Huy oCrdova:    Thank you for referring Edy Bartlett to me for evaluation. Attached you will find relevant portions of my assessment and plan of care.    If you have questions, please do not hesitate to call me. I look forward to following Edy Bartlett along with you.    Sincerely,    Jose Elias Parham MD    Enclosure  CC:  No Recipients    If you would like to receive this communication electronically, please contact [email or web address] or [phone] to request more information on [employer] Link access.    For providers and/or their staff who would like to refer a patient to [facility], please contact us through our one-stop-shop provider referral line, [facility], at [phone].    If you feel you have received this communication in error or would no longer like to receive these types of communications, please e-mail [email or web address]

## 2020-10-12 LAB
6MAM UR QL: NOT DETECTED
7AMINOCLONAZEPAM UR QL: NOT DETECTED
A-OH ALPRAZ UR QL: NOT DETECTED
ALPRAZ UR QL: NOT DETECTED
AMPHET UR QL SCN: NOT DETECTED
ANNOTATION COMMENT IMP: NORMAL
ANNOTATION COMMENT IMP: NORMAL
BARBITURATES UR QL: NOT DETECTED
BUPRENORPHINE UR QL: NOT DETECTED
BZE UR QL: NOT DETECTED
CARBOXYTHC UR QL: NOT DETECTED
CARISOPRODOL UR QL: NOT DETECTED
CLONAZEPAM UR QL: NOT DETECTED
CODEINE UR QL: NOT DETECTED
CREAT UR-MCNC: 219.7 MG/DL (ref 20–400)
DIAZEPAM UR QL: NOT DETECTED
ETHYL GLUCURONIDE UR QL: NOT DETECTED
FENTANYL UR QL: NOT DETECTED
HYDROCODONE UR QL: PRESENT
HYDROMORPHONE UR QL: PRESENT
LORAZEPAM UR QL: NOT DETECTED
MDA UR QL: NOT DETECTED
MDEA UR QL: NOT DETECTED
MDMA UR QL: NOT DETECTED
ME-PHENIDATE UR QL: NOT DETECTED
MEPERIDINE UR QL: NOT DETECTED
METHADONE UR QL: NOT DETECTED
METHAMPHET UR QL: NOT DETECTED
MIDAZOLAM UR QL SCN: NOT DETECTED
MORPHINE UR QL: NOT DETECTED
NORBUPRENORPHINE UR QL CFM: NOT DETECTED
NORDIAZEPAM UR QL: NOT DETECTED
NORFENTANYL UR QL: NOT DETECTED
NORHYDROCODONE UR QL CFM: PRESENT
NOROXYCODONE UR QL CFM: NOT DETECTED
NOROXYMORPHONE: NOT DETECTED
OXAZEPAM UR QL: NOT DETECTED
OXYCODONE UR QL: NOT DETECTED
OXYMORPHONE UR QL: NOT DETECTED
PATHOLOGY STUDY: NORMAL
PCP UR QL: NOT DETECTED
PHENTERMINE UR QL: NOT DETECTED
PROPOXYPH UR QL: NOT DETECTED
SERVICE CMNT-IMP: NORMAL
TAPENTADOL UR QL SCN: NOT DETECTED
TAPENTADOL-O-SULF: NOT DETECTED
TEMAZEPAM UR QL: NOT DETECTED
TRAMADOL UR QL: NOT DETECTED
ZOLPIDEM UR QL: NOT DETECTED

## 2020-10-14 NOTE — TELEPHONE ENCOUNTER
----- Message from Janette Smith MA sent at 10/14/2020  9:44 AM CDT -----  Regarding: asap call back  PT stated he had requested some paper work to be filled out and it has not and know has jeopardized his job  Call Back # [phone]

## 2020-10-19 ENCOUNTER — HOSPITAL ENCOUNTER (OUTPATIENT)
Dept: RADIOLOGY | Facility: HOSPITAL | Age: 65
Discharge: HOME | End: 2020-10-19
Attending: ANESTHESIOLOGY
Payer: COMMERCIAL

## 2020-10-19 DIAGNOSIS — M54.12 RADICULOPATHY, CERVICAL REGION: ICD-10-CM

## 2020-10-19 DIAGNOSIS — M50.30 DDD (DEGENERATIVE DISC DISEASE), CERVICAL: ICD-10-CM

## 2020-10-19 DIAGNOSIS — M48.061 SPINAL STENOSIS OF LUMBAR REGION WITHOUT NEUROGENIC CLAUDICATION: ICD-10-CM

## 2020-10-19 DIAGNOSIS — M51.36 DDD (DEGENERATIVE DISC DISEASE), LUMBAR: ICD-10-CM

## 2020-10-19 DIAGNOSIS — M54.16 BILATERAL LUMBAR RADICULOPATHY: ICD-10-CM

## 2020-10-19 DIAGNOSIS — M47.892 OTHER SPONDYLOSIS, CERVICAL REGION: ICD-10-CM

## 2020-10-19 DIAGNOSIS — M54.9 DORSALGIA, UNSPECIFIED: ICD-10-CM

## 2020-10-19 PROCEDURE — 72141 MRI NECK SPINE W/O DYE: CPT | Mod: 26,,, | Performed by: RADIOLOGY

## 2020-10-19 PROCEDURE — 72148 MRI LUMBAR SPINE W/O DYE: CPT | Mod: 26,,, | Performed by: RADIOLOGY

## 2020-10-19 PROCEDURE — 72141 MRI CERVICAL SPINE WITHOUT CONTRAST: ICD-10-PCS | Mod: 26,,, | Performed by: RADIOLOGY

## 2020-10-19 PROCEDURE — 72148 MRI LUMBAR SPINE W/O DYE: CPT | Mod: TC,PO

## 2020-10-19 PROCEDURE — 72141 MRI NECK SPINE W/O DYE: CPT | Mod: TC,PO

## 2020-10-19 PROCEDURE — 72148 MRI LUMBAR SPINE WITHOUT CONTRAST: ICD-10-PCS | Mod: 26,,, | Performed by: RADIOLOGY

## 2020-10-22 ENCOUNTER — PATIENT OUTREACH (OUTPATIENT)
Dept: ADMINISTRATIVE | Facility: OTHER | Age: 65
End: 2020-10-22

## 2020-10-22 NOTE — PROGRESS NOTES
Health Maintenance Due   Topic Date Due    Shingles Vaccine (1 of 2) 08/16/2006    Colorectal Cancer Screening  08/16/2006    Influenza Vaccine (1) 08/01/2020     Updates were requested from care everywhere.  Chart was reviewed for overdue Proactive [facility] Encounters (ANTHONY) topics (CRS, Breast Cancer Screening, Eye exam)  Health Maintenance has been updated.  LINKS immunization registry triggered.  Immunizations were reconciled.

## 2020-10-26 ENCOUNTER — OFFICE VISIT (OUTPATIENT)
Dept: OTOLARYNGOLOGY | Facility: CLINIC | Age: 65
End: 2020-10-26
Payer: COMMERCIAL

## 2020-10-26 ENCOUNTER — TELEPHONE (OUTPATIENT)
Dept: PHARMACY | Facility: CLINIC | Age: 65
End: 2020-10-26

## 2020-10-26 VITALS — BODY MASS INDEX: 25.19 KG/M2 | WEIGHT: 175.94 LBS | HEIGHT: 70 IN

## 2020-10-26 DIAGNOSIS — C11.9 NASOPHARYNX CANCER: ICD-10-CM

## 2020-10-26 DIAGNOSIS — H90.3 BILATERAL SENSORINEURAL HEARING LOSS: ICD-10-CM

## 2020-10-26 DIAGNOSIS — H69.92 ETD (EUSTACHIAN TUBE DYSFUNCTION), LEFT: ICD-10-CM

## 2020-10-26 DIAGNOSIS — Z45.89 TYMPANOSTOMY TUBE CHECK: Primary | ICD-10-CM

## 2020-10-26 PROCEDURE — 99999 PR PBB SHADOW E&M-EST. PATIENT-LVL III: CPT | Mod: PBBFAC,,, | Performed by: OTOLARYNGOLOGY

## 2020-10-26 PROCEDURE — 99999 PR PBB SHADOW E&M-EST. PATIENT-LVL III: ICD-10-PCS | Mod: PBBFAC,,, | Performed by: OTOLARYNGOLOGY

## 2020-10-26 PROCEDURE — 99213 OFFICE O/P EST LOW 20 MIN: CPT | Mod: S$GLB,,, | Performed by: OTOLARYNGOLOGY

## 2020-10-26 PROCEDURE — 99213 PR OFFICE/OUTPT VISIT, EST, LEVL III, 20-29 MIN: ICD-10-PCS | Mod: S$GLB,,, | Performed by: OTOLARYNGOLOGY

## 2020-10-26 NOTE — PROGRESS NOTES
Subjective:       Patient ID: Edy Bartlett III is a 65 y.o. male.    Chief Complaint: Follow-up    Edy is here for follow-up of L tube for ETD following treatment for T1 NP cancer.   Pressure improved following tube. Still with some intermittent drainage. Mild itching. Hearing unchanged.    Review of Systems   Constitutional: Negative for activity change and appetite change.   Respiratory: Negative for difficulty breathing and wheezing   Cardiovascular: Negative for chest pain.      Objective:          Ears:  Right ear hearing normal to normal and whispered voice; external ear normal without scars, lesions, or masses; ear canal, tympanic membrane, and middle ear normal..   Left Ear:  No middle ear effusion. A PE tube (patent, small amount of serous drainage medial canal.) is seen.         Tests / Results:  none    Assessment:       1. Tympanostomy tube check    2. Bilateral sensorineural hearing loss    3. Nasopharynx cancer    4. ETD (Eustachian tube dysfunction), left          Plan:         Tube doing well  Blow dryer for small amount of int otorrhea. No infection. If persistent x 1 mo, will add Ciprodex.   HA candidate. Discussed.  FU 6 mos, sooner prn

## 2020-10-26 NOTE — PATIENT INSTRUCTIONS
Use blow dryer on cool setting 1-2 minutes  Twice daily to air dry out the drainage.  If you continue to have drainage, send me a message or call me in 1 month.

## 2020-11-09 ENCOUNTER — OFFICE VISIT (OUTPATIENT)
Dept: ORTHOPEDICS | Facility: CLINIC | Age: 65
End: 2020-11-09
Payer: COMMERCIAL

## 2020-11-09 VITALS — BODY MASS INDEX: 25.19 KG/M2 | HEIGHT: 70 IN | WEIGHT: 175.94 LBS

## 2020-11-09 DIAGNOSIS — M75.41 IMPINGEMENT SYNDROME OF RIGHT SHOULDER: Primary | ICD-10-CM

## 2020-11-09 DIAGNOSIS — M25.519 SHOULDER PAIN, UNSPECIFIED CHRONICITY, UNSPECIFIED LATERALITY: ICD-10-CM

## 2020-11-09 PROCEDURE — 99999 PR PBB SHADOW E&M-EST. PATIENT-LVL III: CPT | Mod: PBBFAC,,, | Performed by: ORTHOPAEDIC SURGERY

## 2020-11-09 PROCEDURE — 99999 PR PBB SHADOW E&M-EST. PATIENT-LVL III: ICD-10-PCS | Mod: PBBFAC,,, | Performed by: ORTHOPAEDIC SURGERY

## 2020-11-09 PROCEDURE — 99213 OFFICE O/P EST LOW 20 MIN: CPT | Mod: S$GLB,,, | Performed by: ORTHOPAEDIC SURGERY

## 2020-11-09 PROCEDURE — 99213 PR OFFICE/OUTPT VISIT, EST, LEVL III, 20-29 MIN: ICD-10-PCS | Mod: S$GLB,,, | Performed by: ORTHOPAEDIC SURGERY

## 2020-11-09 NOTE — PROGRESS NOTES
65 years old persistent pain in the shoulder despite conservative measures injections and therapies not seen any relief.  At this point he satisfied with the knee replacement surgery that we have performed.  Were going to get an MRI of his shoulder as a preoperative road map for consideration of surgical intervention of his persistent right shoulder pain despite nonoperative measures

## 2020-11-16 ENCOUNTER — HOSPITAL ENCOUNTER (OUTPATIENT)
Dept: RADIOLOGY | Facility: HOSPITAL | Age: 65
Discharge: HOME | End: 2020-11-16
Attending: ORTHOPAEDIC SURGERY
Payer: COMMERCIAL

## 2020-11-16 DIAGNOSIS — M25.519 SHOULDER PAIN, UNSPECIFIED CHRONICITY, UNSPECIFIED LATERALITY: ICD-10-CM

## 2020-11-16 PROCEDURE — 73221 MRI JOINT UPR EXTREM W/O DYE: CPT | Mod: 26,RT,, | Performed by: RADIOLOGY

## 2020-11-16 PROCEDURE — 73221 MRI SHOULDER WITHOUT CONTRAST RIGHT: ICD-10-PCS | Mod: 26,RT,, | Performed by: RADIOLOGY

## 2020-11-16 PROCEDURE — 73221 MRI JOINT UPR EXTREM W/O DYE: CPT | Mod: TC,PO,RT

## 2020-11-19 ENCOUNTER — OFFICE VISIT (OUTPATIENT)
Dept: ORTHOPEDICS | Facility: CLINIC | Age: 65
End: 2020-11-19
Payer: COMMERCIAL

## 2020-11-19 VITALS — HEIGHT: 70 IN | WEIGHT: 175.94 LBS | BODY MASS INDEX: 25.19 KG/M2

## 2020-11-19 DIAGNOSIS — M25.519 SHOULDER PAIN, UNSPECIFIED CHRONICITY, UNSPECIFIED LATERALITY: Primary | ICD-10-CM

## 2020-11-19 DIAGNOSIS — I63.542 CEREBROVASCULAR ACCIDENT (CVA) DUE TO OCCLUSION OF LEFT CEREBELLAR ARTERY: ICD-10-CM

## 2020-11-19 DIAGNOSIS — M75.41 IMPINGEMENT SYNDROME OF RIGHT SHOULDER: ICD-10-CM

## 2020-11-19 PROCEDURE — 20610 LARGE JOINT ASPIRATION/INJECTION: R SUBACROMIAL BURSA: ICD-10-PCS | Mod: RT,S$GLB,, | Performed by: ORTHOPAEDIC SURGERY

## 2020-11-19 PROCEDURE — 20610 DRAIN/INJ JOINT/BURSA W/O US: CPT | Mod: RT,S$GLB,, | Performed by: ORTHOPAEDIC SURGERY

## 2020-11-19 PROCEDURE — 99214 OFFICE O/P EST MOD 30 MIN: CPT | Mod: 25,S$GLB,, | Performed by: ORTHOPAEDIC SURGERY

## 2020-11-19 PROCEDURE — 99999 PR PBB SHADOW E&M-EST. PATIENT-LVL III: CPT | Mod: PBBFAC,,, | Performed by: ORTHOPAEDIC SURGERY

## 2020-11-19 PROCEDURE — 99999 PR PBB SHADOW E&M-EST. PATIENT-LVL III: ICD-10-PCS | Mod: PBBFAC,,, | Performed by: ORTHOPAEDIC SURGERY

## 2020-11-19 PROCEDURE — 99214 PR OFFICE/OUTPT VISIT, EST, LEVL IV, 30-39 MIN: ICD-10-PCS | Mod: 25,S$GLB,, | Performed by: ORTHOPAEDIC SURGERY

## 2020-11-19 RX ORDER — TRIAMCINOLONE ACETONIDE 40 MG/ML
80 INJECTION, SUSPENSION INTRA-ARTICULAR; INTRAMUSCULAR
Status: DISCONTINUED | OUTPATIENT
Start: 2020-11-19 | End: 2020-11-19 | Stop reason: HOSPADM

## 2020-11-19 RX ADMIN — TRIAMCINOLONE ACETONIDE 80 MG: 40 INJECTION, SUSPENSION INTRA-ARTICULAR; INTRAMUSCULAR at 01:11

## 2020-11-19 NOTE — PROGRESS NOTES
65 years old seen here today in follow-up of his MRI of his right shoulder.  MRI revealed degenerative disease of the shoulder with large retracted rotator cuff tear with superior migration of the humeral head relative to the glenoid also noted to have degenerative changes of the labrum as well as proximal biceps tear with retraction    Assessment:  Rotator cuff arthropathy    Plan:  Kenalog injection to the right shoulder, encourage gentle strengthening and stretching exercises to maximize his function and decrease his pain, follow-up as needed    Further History  Aching pain  Worse with activity  Relieved with rest  No other associated symptoms  No other radiation    Further Exam  Alert and oriented  Pleasant  Contralateral limb has appropriate range of motion for age and condition  Contralateral limb has appropriate strength for age and condition  Contralateral limb has appropriate stability  for age and condition  No adenopathy  Pulses are appropriate for current condition  Skin is intact        Chief Complaint    Chief Complaint   Patient presents with    Right Shoulder - Pain       HPI  Edy Bartlett III is a 65 y.o.  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    Cerebellar stroke     Complication of anesthesia     gets very sore neck post-op if his head is twisted or bent too far with intubation    DDD (degenerative disc disease)     DDD (degenerative disc disease), cervical     DDD (degenerative disc disease), lumbar     DJD (degenerative joint disease) of knee 12/10/2012    ED (erectile dysfunction)     Elevated PSA     Hyperlipidemia     Hypertension     Mass of skin 2012    left temple, possible lipoma    Mobility impaired     cane    Neuropathy     feet    OA (osteoarthritis of spine)        Past Surgical History  Past Surgical  History:   Procedure Laterality Date    APPENDECTOMY      CARPAL TUNNEL RELEASE      both hands/ lt hand x 2/ rt 1    CERVICAL SPINE SURGERY      x 2;fusion C3-7, can only bend his neck a little    EPIDURAL BLOCK INJECTION      injection for pain    FRACTURE SURGERY      ankle    hematoma removed  2006    thigh    HERNIA REPAIR      R inguinal    KNEE ARTHROSCOPY      PROSTATE BIOPSY      TENDON REPAIR      TRIGGER FINGER RELEASE  2007    left    TUMOR EXCISION      fatty tumor to left front of head, has since grown back    UNICOMPARTMENTAL ARTHROPLASTY OF KNEE Left 9/10/2019    Procedure: ARTHROPLASTY, KNEE, UNICOMPARTMENTAL;  Surgeon: Nelson Silva MD;  Location: [facility];  Service: Orthopedics;  Laterality: Left;    urolift         Medications  Current Outpatient Medications   Medication Sig    alirocumab (PRALUENT PEN) 75 mg/mL PnIj Inject 1 mL (75 mg total) into the skin every 14 (fourteen) days.    aspirin (ECOTRIN) 81 MG EC tablet Take 1 tablet (81 mg total) by mouth once daily.    azelastine (ASTELIN) 137 mcg (0.1 %) nasal spray 1 spray (137 mcg total) by Nasal route 2 (two) times daily.    clopidogreL (PLAVIX) 75 mg tablet Take 1 tablet (75 mg total) by mouth once daily.    cyanocobalamin (VITAMIN B-12) 1000 MCG tablet Take 1 tablet (1,000 mcg total) by mouth once daily.    FOLBEE 2.5-25-1 mg Tab Take 1 tablet by mouth once daily.    gemfibroziL (LOPID) 600 MG tablet TAKE 1 TABLET (600 MG TOTAL) BY MOUTH 2 (TWO) TIMES DAILY BEFORE MEALS    ibuprofen (ADVIL,MOTRIN) 200 MG tablet Take by mouth every 6 (six) hours as needed.    lisinopriL 10 MG tablet Take 1 tablet (10 mg total) by mouth once daily.    traMADoL (ULTRAM) 50 mg tablet Take 1 tablet (50 mg total) by mouth 3 (three) times daily as needed for Pain.    triamcinolone acetonide 0.1% (KENALOG) 0.1 % cream Apply topically.    pantoprazole (PROTONIX) 40 MG tablet Take 40 mg by mouth.     No current facility-administered  medications for this visit.        Allergies  Review of patient's allergies indicates:   Allergen Reactions    Statins-hmg-coa reductase inhibitors Other (See Comments)     Not allergy, makes him feel awful       Family History  Family History   Problem Relation Age of Onset    Hyperlipidemia Mother     Heart disease Mother     Diabetes Son     Diabetes type II Maternal Aunt     Prostate cancer Brother        Social History  Social History     Socioeconomic History    Marital status:      Spouse name: Not on file    Number of children: Not on file    Years of education: Not on file    Highest education level: Not on file   Occupational History     Employer:  [employer]   Social Needs    Financial resource strain: Not on file    Food insecurity     Worry: Not on file     Inability: Not on file    Transportation needs     Medical: Not on file     Non-medical: Not on file   Tobacco Use    Smoking status: Never Smoker    Smokeless tobacco: Never Used   Substance and Sexual Activity    Alcohol use: No    Drug use: No    Sexual activity: Not Currently   Lifestyle    Physical activity     Days per week: Not on file     Minutes per session: Not on file    Stress: Not on file   Relationships    Social connections     Talks on phone: Not on file     Gets together: Not on file     Attends Bahai service: Not on file     Active member of club or organization: Not on file     Attends meetings of clubs or organizations: Not on file     Relationship status: Not on file   Other Topics Concern    Not on file   Social History Narrative    Not on file               Review of Systems     Constitutional: Negative    HENT: Negative  Eyes: Negative  Respiratory: Negative  Cardiovascular: Negative  Musculoskeletal: HPI  Skin: Negative  Neurological: Negative  Hematological: Negative  Endocrine: Negative                 Physical Exam    There were no vitals filed for this visit.  Body mass index is 25.24  kg/m².  Physical Examination:     General appearance -  well appearing, and in no distress  Mental status - awake  Neck - supple  Chest -  symmetric air entry  Heart - normal rate   Abdomen - soft      Assessment     1. Shoulder pain, unspecified chronicity, unspecified laterality    2. Impingement syndrome of right shoulder    3. Cerebrovascular accident (CVA) due to occlusion of left cerebellar artery          Plan

## 2020-11-19 NOTE — PROCEDURES
Large Joint Aspiration/Injection: R subacromial bursa    Date/Time: 11/19/2020 1:00 PM  Performed by: Nelson Silva MD  Authorized by: Nelson Silva MD     Consent Done?:  Yes (Verbal)  Site marked: the procedure site was marked    Prep: patient was prepped and draped in usual sterile fashion      Details:  Needle Size:  21 G  Approach:  Posterior  Location:  Shoulder  Site:  R subacromial bursa  Medications:  80 mg triamcinolone acetonide 40 mg/mL  Patient tolerance:  Patient tolerated the procedure well with no immediate complications

## 2020-11-21 ENCOUNTER — SPECIALTY PHARMACY (OUTPATIENT)
Dept: PHARMACY | Facility: CLINIC | Age: 65
End: 2020-11-21

## 2020-11-21 DIAGNOSIS — E78.2 MIXED HYPERLIPIDEMIA: Primary | ICD-10-CM

## 2020-11-21 NOTE — TELEPHONE ENCOUNTER
Specialty Pharmacy - Refill Coordination    Specialty Medication Orders Linked to Encounter      Most Recent Value   Medication #1  alirocumab (PRALUENT PEN) 75 mg/mL PnIj (Order#[id number], Rx#[id number])        Praluent refill confirmed. Praluent will ship 11/24 for delivery 11/25 $0.00 -004. Patient injects 1st and 15th. Patient reports no side effects, missed, doses or injection, site reactions at this time. Patient had no questions or concerns.     Refill Questions - Documented Responses      Most Recent Value   Relationship to patient of person spoken to?  Self   HIPAA/medical authority confirmed?  Yes   Any changes in contact preferences or allowed representatives?  No   Has the patient had any insurance changes?  No   Has the patient had any changes to specialty medication, dose, or instructions?  No   Has the patient started taking any new medications, herbals, or supplements?  No   Has the patient been diagnosed with any new medical conditions?  No   Does the patient have any new allergies to medications or foods?  No   Does the patient have any concerns about side effects?  No   Can the patient store medication/sharps container properly (at the correct temperature, away from children/pets, etc.)?  Yes   Does the patient have any concerns or questions about taking or administering this medication as prescribed?  No   How many doses did the patient miss in the past 4 weeks or since the last fill?  0   How many doses does the patient have on hand?  0   Does the number of doses/days supply remaining match pharmacy expected amounts?  Yes   Does the patient feel that this medication is effective?  Yes   During the past 4 weeks, has patient missed any activities due to condition or medication?  No   During the past 4 weeks, did patient have any of the following urgent care visits?  None   How will the patient receive the medication?  Mail   When does the patient need to receive the medication?  12/01/20    Shipping Address  Home   Address in [facility] confirmed and updated if neccessary?  Yes   Expected Copay ($)  0   Is the patient able to afford the medication copay?  Yes   Payment Method  zero copay   Days supply of Refill  28   Would patient like to speak to a pharmacist?  No   Do you want to trigger an intervention?  No   Do you want to trigger an additional referral task?  No   Refill activity completed?  Yes   Refill activity plan  Refill scheduled   Shipment/Pickup Date:  11/24/20          Current Outpatient Medications   Medication Sig    alirocumab (PRALUENT PEN) 75 mg/mL PnIj Inject 1 mL (75 mg total) into the skin every 14 (fourteen) days.    aspirin (ECOTRIN) 81 MG EC tablet Take 1 tablet (81 mg total) by mouth once daily.    azelastine (ASTELIN) 137 mcg (0.1 %) nasal spray 1 spray (137 mcg total) by Nasal route 2 (two) times daily.    clopidogreL (PLAVIX) 75 mg tablet Take 1 tablet (75 mg total) by mouth once daily.    cyanocobalamin (VITAMIN B-12) 1000 MCG tablet Take 1 tablet (1,000 mcg total) by mouth once daily.    FOLBEE 2.5-25-1 mg Tab Take 1 tablet by mouth once daily.    gemfibroziL (LOPID) 600 MG tablet TAKE 1 TABLET (600 MG TOTAL) BY MOUTH 2 (TWO) TIMES DAILY BEFORE MEALS    ibuprofen (ADVIL,MOTRIN) 200 MG tablet Take by mouth every 6 (six) hours as needed.    lisinopriL 10 MG tablet Take 1 tablet (10 mg total) by mouth once daily.    pantoprazole (PROTONIX) 40 MG tablet Take 40 mg by mouth.    traMADoL (ULTRAM) 50 mg tablet Take 1 tablet (50 mg total) by mouth 3 (three) times daily as needed for Pain.    triamcinolone acetonide 0.1% (KENALOG) 0.1 % cream Apply topically.   Last reviewed on 11/19/2020  3:55 PM by Nelson Silva MD    Review of patient's allergies indicates:   Allergen Reactions    Statins-hmg-coa reductase inhibitors Other (See Comments)     Not allergy, makes him feel awful    Last reviewed on  11/19/2020 3:55 PM by Nelson Silva      Tasks added  this encounter   12/19/2020 - Refill Call (Auto Added)   Tasks due within next 3 months   No tasks due.     Tana KamD  [facility]  [address] [place] [id number]  Phone: [phone]  Fax: [phone]

## 2020-11-30 ENCOUNTER — PATIENT OUTREACH (OUTPATIENT)
Dept: ADMINISTRATIVE | Facility: HOSPITAL | Age: 65
End: 2020-11-30

## 2020-11-30 NOTE — PROGRESS NOTES
2020 Care Everywhere updates requested and reviewed.  Immunizations reconciled. Media reports reviewed.  Duplicate HM overrides and  orders removed.  Overdue HM topic chart audit and/or requested.  Overdue lab testing linked to upcoming lab appointments if applies.    Message sent to patient's portal.      Health Maintenance Due   Topic Date Due    Shingles Vaccine (1 of 2) 2006    Colorectal Cancer Screening  2006    Influenza Vaccine (1) 2020

## 2020-12-19 ENCOUNTER — SPECIALTY PHARMACY (OUTPATIENT)
Dept: PHARMACY | Facility: CLINIC | Age: 65
End: 2020-12-19

## 2020-12-19 NOTE — TELEPHONE ENCOUNTER
Specialty Pharmacy - Refill Coordination    Specialty Medication Orders Linked to Encounter      Most Recent Value   Medication #1  alirocumab (PRALUENT PEN) 75 mg/mL PnIj (Order#[id number], Rx#[id number])          Refill Questions - Documented Responses      Most Recent Value   Relationship to patient of person spoken to?  Self   HIPAA/medical authority confirmed?  Yes   How will the patient receive the medication?  Mail   When does the patient need to receive the medication?  01/01/21   Days supply of Refill  28   Refill activity completed?  Yes   Refill activity plan  Refill scheduled   Shipment/Pickup Date:  12/29/20          Current Outpatient Medications   Medication Sig    alirocumab (PRALUENT PEN) 75 mg/mL PnIj Inject 1 mL (75 mg total) into the skin every 14 (fourteen) days.    aspirin (ECOTRIN) 81 MG EC tablet Take 1 tablet (81 mg total) by mouth once daily.    azelastine (ASTELIN) 137 mcg (0.1 %) nasal spray 1 spray (137 mcg total) by Nasal route 2 (two) times daily.    clopidogreL (PLAVIX) 75 mg tablet Take 1 tablet (75 mg total) by mouth once daily.    cyanocobalamin (VITAMIN B-12) 1000 MCG tablet Take 1 tablet (1,000 mcg total) by mouth once daily.    FOLBEE 2.5-25-1 mg Tab Take 1 tablet by mouth once daily.    gemfibroziL (LOPID) 600 MG tablet TAKE 1 TABLET (600 MG TOTAL) BY MOUTH 2 (TWO) TIMES DAILY BEFORE MEALS    ibuprofen (ADVIL,MOTRIN) 200 MG tablet Take by mouth every 6 (six) hours as needed.    lisinopriL 10 MG tablet Take 1 tablet (10 mg total) by mouth once daily.    pantoprazole (PROTONIX) 40 MG tablet Take 40 mg by mouth.    traMADoL (ULTRAM) 50 mg tablet Take 1 tablet (50 mg total) by mouth 3 (three) times daily as needed for Pain.    triamcinolone acetonide 0.1% (KENALOG) 0.1 % cream Apply topically.   Last reviewed on 11/19/2020  3:55 PM by Nelson Silva MD    Review of patient's allergies indicates:   Allergen Reactions    Statins-hmg-coa reductase inhibitors Other (See  Comments)     Not allergy, makes him feel awful    Last reviewed on  11/19/2020 3:55 PM by eNlson Silva      Tasks added this encounter   No tasks added.   Tasks due within next 3 months   12/19/2020 - Refill Call (Auto Added)     [facility]  [address] [place] [id number]  Phone: [phone]  Fax: [phone]

## 2020-12-28 ENCOUNTER — PATIENT OUTREACH (OUTPATIENT)
Dept: ADMINISTRATIVE | Facility: OTHER | Age: 65
End: 2020-12-28

## 2020-12-29 ENCOUNTER — OFFICE VISIT (OUTPATIENT)
Dept: PAIN MEDICINE | Facility: CLINIC | Age: 65
End: 2020-12-29
Payer: COMMERCIAL

## 2020-12-29 VITALS
SYSTOLIC BLOOD PRESSURE: 135 MMHG | OXYGEN SATURATION: 95 % | RESPIRATION RATE: 20 BRPM | HEART RATE: 76 BPM | DIASTOLIC BLOOD PRESSURE: 80 MMHG | WEIGHT: 177.25 LBS | TEMPERATURE: 98 F | BODY MASS INDEX: 25.43 KG/M2

## 2020-12-29 DIAGNOSIS — Z12.11 SPECIAL SCREENING FOR MALIGNANT NEOPLASMS, COLON: Primary | ICD-10-CM

## 2020-12-29 DIAGNOSIS — M48.02 CERVICAL STENOSIS OF SPINAL CANAL: ICD-10-CM

## 2020-12-29 DIAGNOSIS — M50.30 DDD (DEGENERATIVE DISC DISEASE), CERVICAL: ICD-10-CM

## 2020-12-29 DIAGNOSIS — Z13.9 SCREENING PROCEDURE: ICD-10-CM

## 2020-12-29 DIAGNOSIS — M54.16 LUMBAR RADICULOPATHY: Primary | ICD-10-CM

## 2020-12-29 DIAGNOSIS — M54.12 CERVICAL RADICULOPATHY: ICD-10-CM

## 2020-12-29 PROCEDURE — 99999 PR PBB SHADOW E&M-EST. PATIENT-LVL V: CPT | Mod: PBBFAC,,, | Performed by: ANESTHESIOLOGY

## 2020-12-29 PROCEDURE — 99999 PR PBB SHADOW E&M-EST. PATIENT-LVL V: ICD-10-PCS | Mod: PBBFAC,,, | Performed by: ANESTHESIOLOGY

## 2020-12-29 PROCEDURE — 99214 OFFICE O/P EST MOD 30 MIN: CPT | Mod: S$GLB,,, | Performed by: ANESTHESIOLOGY

## 2020-12-29 PROCEDURE — 99214 PR OFFICE/OUTPT VISIT, EST, LEVL IV, 30-39 MIN: ICD-10-PCS | Mod: S$GLB,,, | Performed by: ANESTHESIOLOGY

## 2020-12-29 RX ORDER — ALPRAZOLAM 0.5 MG/1
1 TABLET, ORALLY DISINTEGRATING ORAL ONCE AS NEEDED
Status: CANCELLED | OUTPATIENT
Start: 2021-01-06 | End: 2032-06-03

## 2020-12-29 RX ORDER — HYDROCODONE BITARTRATE AND ACETAMINOPHEN 7.5; 325 MG/1; MG/1
1 TABLET ORAL EVERY 6 HOURS PRN
Qty: 15 TABLET | Refills: 0 | Status: SHIPPED | OUTPATIENT
Start: 2020-12-29 | End: 2021-01-28

## 2020-12-30 ENCOUNTER — TELEPHONE (OUTPATIENT)
Dept: FAMILY MEDICINE | Facility: CLINIC | Age: 65
End: 2020-12-30

## 2020-12-30 NOTE — PROGRESS NOTES
This note was completed with dictation software and grammatical errors may exist.    CC:  Neck pain, back pain radiating into the legs    HPI:  The patient is a 65-year-old man with a history of CVA, hypertension, cervical and lumbar DDD who presents in referral from Dr. Cordova for neck pain and back pain radiating into the legs.  He returns in follow-up today to review cervical and lumbar spine MRIs but has also had a return of severe left buttock, posterior thigh and lateral hip pain.  This began about 5 days ago, has been so severe that he is having trouble walking, has been spending most of his time lying in bed but he is having difficulty getting in any position that feels comfortable.  He denies any gee weakness, denies any bowel or bladder incontinence.  He try taking tramadol but this did not help much, has returned the prescription to me.  He was actually taking tablets that he had from a different prescription.  He continues to have neck pain but that is not severe at this time.     Previous history:  Patient reports having chronic neck and back pain, has had 2 cervical fusions with the last being at C6/7 prior to this it was C3 through C6.  He reports that his neck pain is located in the midline, radiates out to the side, has difficulty with lateral rotation, extension has been doing physical therapy which seems to be helping slightly.  He does have balance issues but states that this has occurred ever since his CVA in February, 2020.  It looks like the reports mention a left cerebellar artery occlusion causing the stroke, he initially had right-sided deficits which resolved in less than 24 hours but reports having dizziness and balance issues since then.  He reports that his neck pain radiates into his shoulders, denies any major numbness or tingling in the hands right now, no weakness.  In terms of his low back pain, he has had lumbar DDD for many years, has been treated by Dr. Ang with  radiofrequency ablations and epidural steroid injections which have helped to a large degree.  He states that it seems every year he gets a flare up.  He states that his last radiofrequency ablation was in July, 2019 after which she was doing well.  He states that 3 weeks ago he twisted and turned, bent over and had sudden severe pain in the back which has radiated into his bilateral legs.  The leg pain is not constant, does not know when it is going to occur.  He does fine with walking but does feel pain in the legs and burning in his feet at night.  He denies any bowel or bladder incontinence.  He is also getting some physical therapy for his low back right now as well.    Pain intervention history:  He has had multiple epidurals and lumbar radiofrequency ablation in the past.    Antineuropathics:  He thinks he may have tried gabapentin but has poor memory so does not recall what happened with this.  NSAIDs:  He was previously taking large doses of ibuprofen, no longer now that he is on aspirin and Plavix.  Physical therapy:  He is currently enrolled in physical therapy for his right shoulder, neck and low back.  Antidepressants:  Muscle relaxers:  None  Opioids:  He has taken hydrocodone and oxycodone the past, tramadol seem to help as well.  Antiplatelets/Anticoagulants:  Aspirin and Plavix        ROS:  He reports chills, fatigability, ear pain, runny nose, hoarse voice, cough, easy bruising and bleeding, urinary frequency, joint stiffness, back pain, memory loss, dizziness, difficulty sleeping and loss of balance.  Balance of review of systems is negative.    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    Cerebellar stroke     Complication of anesthesia     gets very sore neck post-op if his head is twisted or bent too far with intubation    DDD (degenerative disc disease)     DDD (degenerative disc disease), cervical     DDD (degenerative disc disease), lumbar     DJD (degenerative joint disease) of knee 12/10/2012    ED (erectile dysfunction)     Elevated PSA     Hyperlipidemia     Hypertension     Mass of skin 2012    left temple, possible lipoma    Mobility impaired     cane    Neuropathy     feet    OA (osteoarthritis of spine)        Past Surgical History:   Procedure Laterality Date    APPENDECTOMY      CARPAL TUNNEL RELEASE      both hands/ lt hand x 2/ rt 1    CERVICAL SPINE SURGERY      x 2;fusion C3-7, can only bend his neck a little    EPIDURAL BLOCK INJECTION      injection for pain    FRACTURE SURGERY      ankle    hematoma removed  2006    thigh    HERNIA REPAIR      R inguinal    KNEE ARTHROSCOPY      PROSTATE BIOPSY      TENDON REPAIR      TRIGGER FINGER RELEASE  2007    left    TUMOR EXCISION      fatty tumor to left front of head, has since grown back    UNICOMPARTMENTAL ARTHROPLASTY OF KNEE Left 9/10/2019    Procedure: ARTHROPLASTY, KNEE, UNICOMPARTMENTAL;  Surgeon: Nelson Silva MD;  Location: [facility];  Service: Orthopedics;  Laterality: Left;    urolift         Social History     Socioeconomic History    Marital status:      Spouse name: Not on file    Number of children: Not on file    Years of education: Not on file    Highest education level: Not on file   Occupational History     Employer:  [employer]   Social Needs    Financial resource strain: Not on file    Food insecurity     Worry: Not on file     Inability: Not on file    Transportation needs     Medical: Not on file     Non-medical: Not on file   Tobacco Use    Smoking status: Never Smoker    Smokeless tobacco: Never Used   Substance and Sexual Activity    Alcohol  use: No    Drug use: No    Sexual activity: Not Currently   Lifestyle    Physical activity     Days per week: Not on file     Minutes per session: Not on file    Stress: Not on file   Relationships    Social connections     Talks on phone: Not on file     Gets together: Not on file     Attends [employer] service: Not on file     Active member of club or organization: Not on file     Attends meetings of clubs or organizations: Not on file     Relationship status: Not on file   Other Topics Concern    Not on file   Social History Narrative    Not on file         Medications/Allergies: See med card    Vitals:    12/29/20 1130   BP: 135/80   Pulse: 76   Resp: 20   Temp: 98 °F (36.7 °C)   TempSrc: Temporal   SpO2: 95%   Weight: 80.4 kg (177 lb 4 oz)   PainSc:   8   PainLoc: Back         Physical exam:  Gen: A and O x3, pleasant, well-groomed  Skin: No rashes or obvious lesions  HEENT: PERRLA, no obvious deformities on ears or in canals. Trachea midline.  CVS: Regular rate and rhythm, normal palpable pulses.  Resp:No increased work of breathing, symmetrical chest rise.  Abdomen: Soft, NT/ND.  Musculoskeletal:  Wide-based gait, has difficulty moving from sitting to standing, slightly unsteady.  Neuro:  Lower extremities: 5/5 strength bilaterally, right foot unable to dorsiflex related to previous fractures  Reflexes: Patellar 2+, Achilles 0+ bilaterally.  Sensory:  Intact and symmetrical to light touch and pinprick in L2-S1 dermatomes bilaterally.    Lumbar spine:  Lumbar spine:   Range of motion severely reduced with flexion with increased pain in the left low back and buttock, mildly reduced with extension with slight increased pain in the left low back.  Ramon's test causes no increased pain on either side.    Supine straight leg raise is  Positive for left buttock and posterior thigh pain.  Internal and external rotation of the hip causes no increased pain on either side.  Myofascial exam: No tenderness to  palpation across lumbar paraspinous muscles.    Neuro:  Upper extremities: 5/5 strength bilaterally   Reflexes: Brachioradialis 1+, Bicep 0+, Tricep 0+.   Sensory: Intact and symmetrical to light touch and pinprick in C2-T1 dermatomes bilaterally.  Imbalance with Burrell's, unable to tandem gait, negative Burrell's    Cervical Spine:  Cervical spine:  Range of motion is mildly reduced with forward flexion with no increased pain, moderately reduced with extension and oblique extension either side causing increased pain in ipsilateral neck.    Spurling's maneuver causes ipsilateral neck pain.    Myofascial exam: No Tenderness to palpation across cervical paraspinous region bilaterally.      Imagin11 MRI L-spine:  1.  MILD L1-2 DISC BULGE.     2.  RIGHT PARACENTRAL DISC PROTRUSION AT THE L3-L4 LEVEL CAUSING MODERATE PARACENTRAL THECAL COMPRESSION.   3.  BROAD BASED L4-5 DISC EXTRUSION CAUSING MODERATELY SEVERE THECAL SAC COMPRESSION AND MILD RIGHT FORAMINAL STENOSIS.     4.  L5-S1 DISC BULGE CAUSING NO THECAL SAC COMPRESSION OR FORAMINAL COMPROMISE.       10/19/20 MRI L-spine:  L1-2: There is mild generalized disc bulging contributing to mild spinal canal narrowing and mild bilateral neuroforaminal narrowing.   L2-3: There is mild generalized disc bulging and bilateral facet arthropathy resulting in mild spinal canal narrowing and mild bilateral neuroforaminal narrowing.   L3-4: Mild generalized disc bulging and facet arthropathy result in mild spinal canal narrowing and mild bilateral neuroforaminal narrowing.   L4-5: There is moderate generalized disc bulging and bilateral facet arthropathy resulting in moderate spinal canal narrowing and moderate bilateral neuroforaminal narrowing.   L5-S1: There is mild broad-based posterior disc bulging along the bilateral facet arthropathy.  These result in moderate bilateral neuroforaminal narrowing without significant spinal canal narrowing.     10/19/20 MRI  C-spine:  There has been anterior cervical discectomy and fusion at C6-7.  Interbody fusion is present from C3 through C7, with osseous consolidation present across each level.  There is trace anterolisthesis of C2 on C3.  There is moderate loss of disc height at C7-T1.  Moderate multilevel facet arthropathy is present, worst on the right at C2-3.  The cervical cord mild chronic myelomalacia at C4 and C5.   C2-3: There is small broad-based posterior disc osteophyte complex formation and bilateral facet arthropathy contributing to mild spinal canal narrowing along with moderate right neuroforaminal narrowing.   C3-4: Mild spinal canal narrowing is present along with mild left and moderate right neuroforaminal narrowing.   C4-5: There is mild spinal canal narrowing along with mild bilateral neuroforaminal narrowing.   C5-6: There is a small left paracentral osteophyte.  Overall mild spinal canal narrowing and moderate right neuroforaminal narrowing.   C6-7: There is moderate spinal canal narrowing and moderate bilateral neuroforaminal narrowing.   C7-T1: There is moderate broad-based posterior disc osteophyte complex formation and bilateral facet arthropathy contributing to moderate spinal canal narrowing and severe bilateral neuroforaminal narrowing.       Assessment:  The patient is a 65-year-old man with a history of CVA, hypertension, cervical and lumbar DDD who presents in referral from Dr. Cordova for neck pain and back pain radiating into the legs.    1. Lumbar radiculopathy  Vital signs    Verify informed consent    Notify physician     Notify physician     Notify physician (specify)    Diet NPO    Case Request Operating Room: Injection-steroid-epidural-lumbar L5/S1 to the left    Place in Outpatient    alprazolam ODT dissolvable tablet 1 mg   2. DDD (degenerative disc disease), cervical     3. Cervical radiculopathy     4. Cervical stenosis of spinal canal     5. Screening procedure  COVID-19 Routine Screening          Plan:    1.  We reviewed both his cervical spine and lumbar spine MRIs.  In terms of his pain today, this is primarily on the left side and I think it is more due to his foraminal narrowing out to the left side at L5/S1 which she is moderate to severe.  We discussed the role of epidural steroid injections and he would like to proceed.  He is a high-risk patient and we would need to get permission to discontinue his aspirin and Plavix prior to the injection.  I will tentatively schedule him.  2.  In the meantime I have given him a prescription for hydrocodone 7.5/325 to take as needed for now.  3.  We reviewed his cervical spine MRI which shows solid fusion from C3 through C7.  There is disc bulging and posterior thickening at C2/3 especially worse to the right side causing canal stenosis and contact of the cord, D formation of the cord but no signal changes .  The canal is patent from C3 through C7 and no major foraminal narrowing at any level.  At C7/T1 however, there is moderate to severe canal narrowing due to disc bulging and posterior ligamentous thickening.  No changes in the cord at this level.  We discussed that if he starts to develop any weakness or balance issues, I am going to have him see Neurosurgery again.

## 2021-01-05 ENCOUNTER — DOCUMENTATION ONLY (OUTPATIENT)
Dept: FAMILY MEDICINE | Facility: CLINIC | Age: 66
End: 2021-01-05

## 2021-01-05 ENCOUNTER — TELEPHONE (OUTPATIENT)
Dept: FAMILY MEDICINE | Facility: CLINIC | Age: 66
End: 2021-01-05

## 2021-01-05 ENCOUNTER — OFFICE VISIT (OUTPATIENT)
Dept: FAMILY MEDICINE | Facility: CLINIC | Age: 66
End: 2021-01-05
Payer: COMMERCIAL

## 2021-01-05 VITALS
SYSTOLIC BLOOD PRESSURE: 120 MMHG | OXYGEN SATURATION: 98 % | HEIGHT: 70 IN | WEIGHT: 177.25 LBS | BODY MASS INDEX: 25.38 KG/M2 | DIASTOLIC BLOOD PRESSURE: 82 MMHG | HEART RATE: 65 BPM | TEMPERATURE: 98 F

## 2021-01-05 DIAGNOSIS — I63.542 CEREBROVASCULAR ACCIDENT (CVA) DUE TO OCCLUSION OF LEFT CEREBELLAR ARTERY: Primary | ICD-10-CM

## 2021-01-05 DIAGNOSIS — I10 ESSENTIAL HYPERTENSION: ICD-10-CM

## 2021-01-05 DIAGNOSIS — E78.2 MIXED HYPERLIPIDEMIA: ICD-10-CM

## 2021-01-05 DIAGNOSIS — Z12.11 COLON CANCER SCREENING: ICD-10-CM

## 2021-01-05 DIAGNOSIS — N52.1 ERECTILE DYSFUNCTION DUE TO DISEASES CLASSIFIED ELSEWHERE: ICD-10-CM

## 2021-01-05 DIAGNOSIS — R26.2 DIFFICULTY WALKING: ICD-10-CM

## 2021-01-05 PROCEDURE — 99999 PR PBB SHADOW E&M-EST. PATIENT-LVL IV: ICD-10-PCS | Mod: PBBFAC,,, | Performed by: INTERNAL MEDICINE

## 2021-01-05 PROCEDURE — 99999 PR PBB SHADOW E&M-EST. PATIENT-LVL IV: CPT | Mod: PBBFAC,,, | Performed by: INTERNAL MEDICINE

## 2021-01-05 PROCEDURE — 99214 OFFICE O/P EST MOD 30 MIN: CPT | Mod: S$GLB,,, | Performed by: INTERNAL MEDICINE

## 2021-01-05 PROCEDURE — 99214 PR OFFICE/OUTPT VISIT, EST, LEVL IV, 30-39 MIN: ICD-10-PCS | Mod: S$GLB,,, | Performed by: INTERNAL MEDICINE

## 2021-01-05 RX ORDER — SILDENAFIL 50 MG/1
50 TABLET, FILM COATED ORAL DAILY PRN
Qty: 10 TABLET | Refills: 3 | Status: SHIPPED | OUTPATIENT
Start: 2021-01-05 | End: 2023-07-26

## 2021-01-07 ENCOUNTER — TELEPHONE (OUTPATIENT)
Dept: PAIN MEDICINE | Facility: CLINIC | Age: 66
End: 2021-01-07

## 2021-01-08 DIAGNOSIS — M54.16 LUMBAR RADICULOPATHY: Primary | ICD-10-CM

## 2021-01-08 RX ORDER — ALPRAZOLAM 0.5 MG/1
1 TABLET, ORALLY DISINTEGRATING ORAL ONCE AS NEEDED
Status: CANCELLED | OUTPATIENT
Start: 2021-01-25 | End: 2032-06-22

## 2021-01-12 ENCOUNTER — DOCUMENTATION ONLY (OUTPATIENT)
Dept: PAIN MEDICINE | Facility: CLINIC | Age: 66
End: 2021-01-12

## 2021-01-22 ENCOUNTER — TELEPHONE (OUTPATIENT)
Dept: PAIN MEDICINE | Facility: CLINIC | Age: 66
End: 2021-01-22

## 2021-01-22 ENCOUNTER — LAB VISIT (OUTPATIENT)
Dept: FAMILY MEDICINE | Facility: CLINIC | Age: 66
End: 2021-01-22
Payer: COMMERCIAL

## 2021-01-22 ENCOUNTER — SPECIALTY PHARMACY (OUTPATIENT)
Dept: PHARMACY | Facility: CLINIC | Age: 66
End: 2021-01-22

## 2021-01-22 DIAGNOSIS — Z13.9 SCREENING PROCEDURE: ICD-10-CM

## 2021-01-22 PROCEDURE — U0003 INFECTIOUS AGENT DETECTION BY NUCLEIC ACID (DNA OR RNA); SEVERE ACUTE RESPIRATORY SYNDROME CORONAVIRUS 2 (SARS-COV-2) (CORONAVIRUS DISEASE [COVID-19]), AMPLIFIED PROBE TECHNIQUE, MAKING USE OF HIGH THROUGHPUT TECHNOLOGIES AS DESCRIBED BY CMS-2020-01-R: HCPCS

## 2021-01-23 LAB — SARS-COV-2 RNA RESP QL NAA+PROBE: NOT DETECTED

## 2021-01-25 ENCOUNTER — HOSPITAL ENCOUNTER (OUTPATIENT)
Dept: RADIOLOGY | Facility: HOSPITAL | Age: 66
Discharge: HOME | End: 2021-01-25
Attending: ANESTHESIOLOGY
Payer: COMMERCIAL

## 2021-01-25 ENCOUNTER — TELEPHONE (OUTPATIENT)
Dept: PAIN MEDICINE | Facility: CLINIC | Age: 66
End: 2021-01-25

## 2021-01-25 DIAGNOSIS — M51.36 DDD (DEGENERATIVE DISC DISEASE), LUMBAR: ICD-10-CM

## 2021-02-01 ENCOUNTER — OFFICE VISIT (OUTPATIENT)
Dept: FAMILY MEDICINE | Facility: CLINIC | Age: 66
End: 2021-02-01
Payer: COMMERCIAL

## 2021-02-01 VITALS
HEART RATE: 74 BPM | SYSTOLIC BLOOD PRESSURE: 132 MMHG | BODY MASS INDEX: 27.49 KG/M2 | OXYGEN SATURATION: 96 % | WEIGHT: 191.56 LBS | DIASTOLIC BLOOD PRESSURE: 88 MMHG

## 2021-02-01 DIAGNOSIS — H72.92 LEFT OTITIS MEDIA WITH SPONTANEOUS RUPTURE OF EARDRUM: ICD-10-CM

## 2021-02-01 DIAGNOSIS — G47.01 INSOMNIA DUE TO MEDICAL CONDITION: ICD-10-CM

## 2021-02-01 DIAGNOSIS — I63.542 CEREBROVASCULAR ACCIDENT (CVA) DUE TO OCCLUSION OF LEFT CEREBELLAR ARTERY: Primary | ICD-10-CM

## 2021-02-01 DIAGNOSIS — I10 ESSENTIAL HYPERTENSION: ICD-10-CM

## 2021-02-01 DIAGNOSIS — H66.92 LEFT OTITIS MEDIA WITH SPONTANEOUS RUPTURE OF EARDRUM: ICD-10-CM

## 2021-02-01 PROCEDURE — 99999 PR PBB SHADOW E&M-EST. PATIENT-LVL III: CPT | Mod: PBBFAC,,, | Performed by: INTERNAL MEDICINE

## 2021-02-01 PROCEDURE — 99999 PR PBB SHADOW E&M-EST. PATIENT-LVL III: ICD-10-PCS | Mod: PBBFAC,,, | Performed by: INTERNAL MEDICINE

## 2021-02-01 PROCEDURE — 99214 OFFICE O/P EST MOD 30 MIN: CPT | Mod: S$GLB,,, | Performed by: INTERNAL MEDICINE

## 2021-02-01 PROCEDURE — 99214 PR OFFICE/OUTPT VISIT, EST, LEVL IV, 30-39 MIN: ICD-10-PCS | Mod: S$GLB,,, | Performed by: INTERNAL MEDICINE

## 2021-02-01 RX ORDER — ZOLPIDEM TARTRATE 5 MG/1
5 TABLET ORAL NIGHTLY PRN
Qty: 30 TABLET | Refills: 3 | Status: SHIPPED | OUTPATIENT
Start: 2021-02-01 | End: 2023-07-26

## 2021-02-01 RX ORDER — CEFDINIR 300 MG/1
300 CAPSULE ORAL 2 TIMES DAILY
Qty: 14 CAPSULE | Refills: 0 | Status: SHIPPED | OUTPATIENT
Start: 2021-02-01 | End: 2021-02-08

## 2021-02-01 RX ORDER — CIPROFLOXACIN AND DEXAMETHASONE 3; 1 MG/ML; MG/ML
4 SUSPENSION/ DROPS AURICULAR (OTIC) 2 TIMES DAILY
Qty: 7.5 ML | Refills: 0 | Status: SHIPPED | OUTPATIENT
Start: 2021-02-01 | End: 2021-11-10 | Stop reason: SDUPTHER

## 2021-02-18 ENCOUNTER — PATIENT OUTREACH (OUTPATIENT)
Dept: ADMINISTRATIVE | Facility: OTHER | Age: 66
End: 2021-02-18

## 2021-02-22 ENCOUNTER — SPECIALTY PHARMACY (OUTPATIENT)
Dept: PHARMACY | Facility: CLINIC | Age: 66
End: 2021-02-22

## 2021-03-15 ENCOUNTER — OFFICE VISIT (OUTPATIENT)
Dept: OTOLARYNGOLOGY | Facility: CLINIC | Age: 66
End: 2021-03-15
Payer: COMMERCIAL

## 2021-03-15 VITALS — BODY MASS INDEX: 26.22 KG/M2 | WEIGHT: 183.19 LBS | HEIGHT: 70 IN

## 2021-03-15 DIAGNOSIS — Z45.89 TYMPANOSTOMY TUBE CHECK: Primary | ICD-10-CM

## 2021-03-15 DIAGNOSIS — H69.92 ETD (EUSTACHIAN TUBE DYSFUNCTION), LEFT: ICD-10-CM

## 2021-03-15 DIAGNOSIS — C11.9 NASOPHARYNX CANCER: ICD-10-CM

## 2021-03-15 PROCEDURE — 99213 OFFICE O/P EST LOW 20 MIN: CPT | Mod: 25,S$GLB,, | Performed by: OTOLARYNGOLOGY

## 2021-03-15 PROCEDURE — 99999 PR PBB SHADOW E&M-EST. PATIENT-LVL IV: CPT | Mod: PBBFAC,,, | Performed by: OTOLARYNGOLOGY

## 2021-03-15 PROCEDURE — 99213 PR OFFICE/OUTPT VISIT, EST, LEVL III, 20-29 MIN: ICD-10-PCS | Mod: 25,S$GLB,, | Performed by: OTOLARYNGOLOGY

## 2021-03-15 PROCEDURE — 99999 PR PBB SHADOW E&M-EST. PATIENT-LVL IV: ICD-10-PCS | Mod: PBBFAC,,, | Performed by: OTOLARYNGOLOGY

## 2021-03-15 PROCEDURE — 69210 PR REMOVAL IMPACTED CERUMEN REQUIRING INSTRUMENTATION, UNILATERAL: ICD-10-PCS | Mod: S$GLB,,, | Performed by: OTOLARYNGOLOGY

## 2021-03-15 PROCEDURE — 69210 REMOVE IMPACTED EAR WAX UNI: CPT | Mod: S$GLB,,, | Performed by: OTOLARYNGOLOGY

## 2021-03-22 DIAGNOSIS — E78.2 MIXED HYPERLIPIDEMIA: ICD-10-CM

## 2021-03-22 RX ORDER — ALIROCUMAB 75 MG/ML
75 INJECTION, SOLUTION SUBCUTANEOUS
Qty: 2 ML | Refills: 12 | Status: SHIPPED | OUTPATIENT
Start: 2021-03-22 | End: 2022-03-28 | Stop reason: SDUPTHER

## 2021-03-23 ENCOUNTER — SPECIALTY PHARMACY (OUTPATIENT)
Dept: PHARMACY | Facility: CLINIC | Age: 66
End: 2021-03-23

## 2021-03-24 ENCOUNTER — SPECIALTY PHARMACY (OUTPATIENT)
Dept: PHARMACY | Facility: CLINIC | Age: 66
End: 2021-03-24

## 2021-04-06 ENCOUNTER — PATIENT MESSAGE (OUTPATIENT)
Dept: ADMINISTRATIVE | Facility: HOSPITAL | Age: 66
End: 2021-04-06

## 2021-04-21 ENCOUNTER — SPECIALTY PHARMACY (OUTPATIENT)
Dept: PHARMACY | Facility: CLINIC | Age: 66
End: 2021-04-21

## 2021-04-29 ENCOUNTER — PATIENT MESSAGE (OUTPATIENT)
Dept: RESEARCH | Facility: HOSPITAL | Age: 66
End: 2021-04-29

## 2021-05-18 ENCOUNTER — PATIENT MESSAGE (OUTPATIENT)
Dept: ADMINISTRATIVE | Facility: HOSPITAL | Age: 66
End: 2021-05-18

## 2021-05-18 ENCOUNTER — PATIENT OUTREACH (OUTPATIENT)
Dept: ADMINISTRATIVE | Facility: HOSPITAL | Age: 66
End: 2021-05-18

## 2021-05-21 ENCOUNTER — SPECIALTY PHARMACY (OUTPATIENT)
Dept: PHARMACY | Facility: CLINIC | Age: 66
End: 2021-05-21

## 2021-06-02 ENCOUNTER — LAB VISIT (OUTPATIENT)
Dept: LAB | Facility: HOSPITAL | Age: 66
End: 2021-06-02
Attending: INTERNAL MEDICINE
Payer: COMMERCIAL

## 2021-06-02 ENCOUNTER — OFFICE VISIT (OUTPATIENT)
Dept: FAMILY MEDICINE | Facility: CLINIC | Age: 66
End: 2021-06-02
Payer: COMMERCIAL

## 2021-06-02 VITALS
DIASTOLIC BLOOD PRESSURE: 80 MMHG | OXYGEN SATURATION: 97 % | TEMPERATURE: 98 F | SYSTOLIC BLOOD PRESSURE: 121 MMHG | HEIGHT: 70 IN | BODY MASS INDEX: 25.98 KG/M2 | HEART RATE: 74 BPM | WEIGHT: 181.44 LBS

## 2021-06-02 DIAGNOSIS — I10 ESSENTIAL HYPERTENSION: ICD-10-CM

## 2021-06-02 DIAGNOSIS — H72.92 LEFT OTITIS MEDIA WITH SPONTANEOUS RUPTURE OF EARDRUM: ICD-10-CM

## 2021-06-02 DIAGNOSIS — H66.92 LEFT OTITIS MEDIA WITH SPONTANEOUS RUPTURE OF EARDRUM: ICD-10-CM

## 2021-06-02 DIAGNOSIS — I10 ESSENTIAL HYPERTENSION: Primary | ICD-10-CM

## 2021-06-02 DIAGNOSIS — E78.2 MIXED HYPERLIPIDEMIA: ICD-10-CM

## 2021-06-02 DIAGNOSIS — Z12.11 COLON CANCER SCREENING: ICD-10-CM

## 2021-06-02 DIAGNOSIS — I63.542 CEREBROVASCULAR ACCIDENT (CVA) DUE TO OCCLUSION OF LEFT CEREBELLAR ARTERY: ICD-10-CM

## 2021-06-02 DIAGNOSIS — Z86.73 HISTORY OF CVA (CEREBROVASCULAR ACCIDENT): ICD-10-CM

## 2021-06-02 LAB
BASOPHILS # BLD AUTO: 0.04 K/UL (ref 0–0.2)
BASOPHILS NFR BLD: 0.7 % (ref 0–1.9)
DIFFERENTIAL METHOD: ABNORMAL
EOSINOPHIL # BLD AUTO: 0 K/UL (ref 0–0.5)
EOSINOPHIL NFR BLD: 0.5 % (ref 0–8)
ERYTHROCYTE [DISTWIDTH] IN BLOOD BY AUTOMATED COUNT: 13.3 % (ref 11.5–14.5)
HCT VFR BLD AUTO: 49.5 % (ref 40–54)
HGB BLD-MCNC: 16.5 G/DL (ref 14–18)
IMM GRANULOCYTES # BLD AUTO: 0.02 K/UL (ref 0–0.04)
IMM GRANULOCYTES NFR BLD AUTO: 0.3 % (ref 0–0.5)
LYMPHOCYTES # BLD AUTO: 1.1 K/UL (ref 1–4.8)
LYMPHOCYTES NFR BLD: 18.5 % (ref 18–48)
MCH RBC QN AUTO: 32.1 PG (ref 27–31)
MCHC RBC AUTO-ENTMCNC: 33.3 G/DL (ref 32–36)
MCV RBC AUTO: 96 FL (ref 82–98)
MONOCYTES # BLD AUTO: 0.7 K/UL (ref 0.3–1)
MONOCYTES NFR BLD: 11.5 % (ref 4–15)
NEUTROPHILS # BLD AUTO: 4 K/UL (ref 1.8–7.7)
NEUTROPHILS NFR BLD: 68.5 % (ref 38–73)
NRBC BLD-RTO: 0 /100 WBC
PLATELET # BLD AUTO: 205 K/UL (ref 150–450)
PMV BLD AUTO: 9.5 FL (ref 9.2–12.9)
RBC # BLD AUTO: 5.14 M/UL (ref 4.6–6.2)
WBC # BLD AUTO: 5.9 K/UL (ref 3.9–12.7)

## 2021-06-02 PROCEDURE — 99396 PR PREVENTIVE VISIT,EST,40-64: ICD-10-PCS | Mod: S$GLB,,, | Performed by: INTERNAL MEDICINE

## 2021-06-02 PROCEDURE — 99999 PR PBB SHADOW E&M-EST. PATIENT-LVL IV: ICD-10-PCS | Mod: PBBFAC,,, | Performed by: INTERNAL MEDICINE

## 2021-06-02 PROCEDURE — 99999 PR PBB SHADOW E&M-EST. PATIENT-LVL IV: CPT | Mod: PBBFAC,,, | Performed by: INTERNAL MEDICINE

## 2021-06-02 PROCEDURE — 85025 COMPLETE CBC W/AUTO DIFF WBC: CPT | Performed by: INTERNAL MEDICINE

## 2021-06-02 PROCEDURE — 80061 LIPID PANEL: CPT | Performed by: INTERNAL MEDICINE

## 2021-06-02 PROCEDURE — 80053 COMPREHEN METABOLIC PANEL: CPT | Performed by: INTERNAL MEDICINE

## 2021-06-02 PROCEDURE — 36415 COLL VENOUS BLD VENIPUNCTURE: CPT | Mod: PO | Performed by: INTERNAL MEDICINE

## 2021-06-02 PROCEDURE — 99396 PREV VISIT EST AGE 40-64: CPT | Mod: S$GLB,,, | Performed by: INTERNAL MEDICINE

## 2021-06-03 LAB
ALBUMIN SERPL BCP-MCNC: 3.7 G/DL (ref 3.5–5.2)
ALP SERPL-CCNC: 85 U/L (ref 55–135)
ALT SERPL W/O P-5'-P-CCNC: 34 U/L (ref 10–44)
ANION GAP SERPL CALC-SCNC: 11 MMOL/L (ref 8–16)
AST SERPL-CCNC: 25 U/L (ref 10–40)
BILIRUB SERPL-MCNC: 0.4 MG/DL (ref 0.1–1)
BUN SERPL-MCNC: 9 MG/DL (ref 8–23)
CALCIUM SERPL-MCNC: 10 MG/DL (ref 8.7–10.5)
CHLORIDE SERPL-SCNC: 102 MMOL/L (ref 95–110)
CHOLEST SERPL-MCNC: 171 MG/DL (ref 120–199)
CHOLEST/HDLC SERPL: 4.1 {RATIO} (ref 2–5)
CO2 SERPL-SCNC: 25 MMOL/L (ref 23–29)
CREAT SERPL-MCNC: 1.2 MG/DL (ref 0.5–1.4)
EST. GFR  (AFRICAN AMERICAN): >60 ML/MIN/1.73 M^2
EST. GFR  (NON AFRICAN AMERICAN): >60 ML/MIN/1.73 M^2
GLUCOSE SERPL-MCNC: 76 MG/DL (ref 70–110)
HDLC SERPL-MCNC: 42 MG/DL (ref 40–75)
HDLC SERPL: 24.6 % (ref 20–50)
LDLC SERPL CALC-MCNC: 94.2 MG/DL (ref 63–159)
NONHDLC SERPL-MCNC: 129 MG/DL
POTASSIUM SERPL-SCNC: 4.5 MMOL/L (ref 3.5–5.1)
PROT SERPL-MCNC: 7 G/DL (ref 6–8.4)
SODIUM SERPL-SCNC: 138 MMOL/L (ref 136–145)
TRIGL SERPL-MCNC: 174 MG/DL (ref 30–150)

## 2021-06-08 ENCOUNTER — PATIENT MESSAGE (OUTPATIENT)
Dept: PHARMACY | Facility: CLINIC | Age: 66
End: 2021-06-08

## 2021-06-23 NOTE — TELEPHONE ENCOUNTER
----- Message from Clare Siddiqui sent at 10/5/2020 10:53 AM CDT -----  Regarding: Requesting Call Back  Contact: Patient  Type: Needs Medical Advice    Who Called:  Patient    Best Call Back Number: [phone]    Additional Information: Patient requesting call back to discuss paperwork that needs to be filled out        [facility] Nurse Triage Telephone Note      Provider: JOHN Grey  Facility: [facility]    Facility Type: TCU    Caller: Eloy  Call Back Number:  [phone]    Allergies: Patient has no known allergies.    Reason for call: Hgb of 8.0 last was 8.7 and previous to that was 8.0.  No s/s of bleeding, VS stable, not on any iron      Verbal Order/Direction given by Provider: Fe 325mg two times a day check Hgb 2/11    Provider giving order: JOHN Grey    Verbal order given to: Eloy Gibbs RN

## 2021-06-29 ENCOUNTER — PATIENT OUTREACH (OUTPATIENT)
Dept: ADMINISTRATIVE | Facility: OTHER | Age: 66
End: 2021-06-29

## 2021-06-30 ENCOUNTER — OFFICE VISIT (OUTPATIENT)
Dept: OTOLARYNGOLOGY | Facility: CLINIC | Age: 66
End: 2021-06-30
Payer: COMMERCIAL

## 2021-06-30 VITALS — HEIGHT: 70 IN | BODY MASS INDEX: 25.75 KG/M2 | WEIGHT: 179.88 LBS

## 2021-06-30 DIAGNOSIS — H61.22 IMPACTED CERUMEN OF LEFT EAR: ICD-10-CM

## 2021-06-30 DIAGNOSIS — Z97.4 WEARS HEARING AID IN BOTH EARS: ICD-10-CM

## 2021-06-30 DIAGNOSIS — H93.12 LEFT-SIDED TINNITUS: ICD-10-CM

## 2021-06-30 DIAGNOSIS — H92.12 PURULENT OTORRHEA OF LEFT EAR: Primary | ICD-10-CM

## 2021-06-30 DIAGNOSIS — Z96.22 PATENT TYMPANOSTOMY TUBE: ICD-10-CM

## 2021-06-30 PROCEDURE — 69210 REMOVE IMPACTED EAR WAX UNI: CPT | Mod: S$GLB,,, | Performed by: NURSE PRACTITIONER

## 2021-06-30 PROCEDURE — 99214 OFFICE O/P EST MOD 30 MIN: CPT | Mod: 25,S$GLB,, | Performed by: NURSE PRACTITIONER

## 2021-06-30 PROCEDURE — 87070 CULTURE OTHR SPECIMN AEROBIC: CPT | Performed by: NURSE PRACTITIONER

## 2021-06-30 PROCEDURE — 69210 PR REMOVAL IMPACTED CERUMEN REQUIRING INSTRUMENTATION, UNILATERAL: ICD-10-PCS | Mod: S$GLB,,, | Performed by: NURSE PRACTITIONER

## 2021-06-30 PROCEDURE — 99999 PR PBB SHADOW E&M-EST. PATIENT-LVL III: CPT | Mod: PBBFAC,,, | Performed by: NURSE PRACTITIONER

## 2021-06-30 PROCEDURE — 99214 PR OFFICE/OUTPT VISIT, EST, LEVL IV, 30-39 MIN: ICD-10-PCS | Mod: 25,S$GLB,, | Performed by: NURSE PRACTITIONER

## 2021-06-30 PROCEDURE — 99999 PR PBB SHADOW E&M-EST. PATIENT-LVL III: ICD-10-PCS | Mod: PBBFAC,,, | Performed by: NURSE PRACTITIONER

## 2021-06-30 RX ORDER — CIPROFLOXACIN HYDROCHLORIDE 3 MG/ML
SOLUTION/ DROPS OPHTHALMIC
Qty: 10 ML | Refills: 0 | Status: SHIPPED | OUTPATIENT
Start: 2021-06-30 | End: 2023-07-26

## 2021-07-03 LAB — BACTERIA SPEC AEROBE CULT: NORMAL

## 2021-07-06 ENCOUNTER — TELEPHONE (OUTPATIENT)
Dept: ORTHOPEDICS | Facility: CLINIC | Age: 66
End: 2021-07-06

## 2021-07-07 ENCOUNTER — PATIENT MESSAGE (OUTPATIENT)
Dept: ADMINISTRATIVE | Facility: HOSPITAL | Age: 66
End: 2021-07-07

## 2021-07-12 ENCOUNTER — SPECIALTY PHARMACY (OUTPATIENT)
Dept: PHARMACY | Facility: CLINIC | Age: 66
End: 2021-07-12

## 2021-08-04 ENCOUNTER — SPECIALTY PHARMACY (OUTPATIENT)
Dept: PHARMACY | Facility: CLINIC | Age: 66
End: 2021-08-04

## 2021-09-15 ENCOUNTER — PATIENT MESSAGE (OUTPATIENT)
Dept: PHARMACY | Facility: CLINIC | Age: 66
End: 2021-09-15

## 2021-09-24 ENCOUNTER — PATIENT MESSAGE (OUTPATIENT)
Dept: PHARMACY | Facility: CLINIC | Age: 66
End: 2021-09-24

## 2021-09-29 ENCOUNTER — SPECIALTY PHARMACY (OUTPATIENT)
Dept: PHARMACY | Facility: CLINIC | Age: 66
End: 2021-09-29

## 2021-10-23 ENCOUNTER — PATIENT MESSAGE (OUTPATIENT)
Dept: PHARMACY | Facility: CLINIC | Age: 66
End: 2021-10-23
Payer: MEDICARE

## 2021-10-27 ENCOUNTER — SPECIALTY PHARMACY (OUTPATIENT)
Dept: PHARMACY | Facility: CLINIC | Age: 66
End: 2021-10-27
Payer: MEDICARE

## 2021-11-09 ENCOUNTER — PATIENT OUTREACH (OUTPATIENT)
Dept: ADMINISTRATIVE | Facility: OTHER | Age: 66
End: 2021-11-09
Payer: MEDICARE

## 2021-11-09 ENCOUNTER — TELEPHONE (OUTPATIENT)
Dept: OTOLARYNGOLOGY | Facility: CLINIC | Age: 66
End: 2021-11-09
Payer: MEDICARE

## 2021-11-10 ENCOUNTER — OFFICE VISIT (OUTPATIENT)
Dept: OTOLARYNGOLOGY | Facility: CLINIC | Age: 66
End: 2021-11-10
Payer: MEDICARE

## 2021-11-10 VITALS — BODY MASS INDEX: 25.85 KG/M2 | HEIGHT: 70 IN | WEIGHT: 180.56 LBS

## 2021-11-10 DIAGNOSIS — H90.3 BILATERAL SENSORINEURAL HEARING LOSS: ICD-10-CM

## 2021-11-10 DIAGNOSIS — M26.653 ARTHROPATHY OF BOTH TEMPOROMANDIBULAR JOINTS: ICD-10-CM

## 2021-11-10 DIAGNOSIS — H92.12 PURULENT OTORRHEA OF LEFT EAR: Primary | ICD-10-CM

## 2021-11-10 DIAGNOSIS — H93.12 LEFT-SIDED TINNITUS: ICD-10-CM

## 2021-11-10 DIAGNOSIS — Z96.22 PATENT TYMPANOSTOMY TUBE: ICD-10-CM

## 2021-11-10 PROCEDURE — 99214 OFFICE O/P EST MOD 30 MIN: CPT | Mod: PBBFAC,PO | Performed by: NURSE PRACTITIONER

## 2021-11-10 PROCEDURE — 99999 PR PBB SHADOW E&M-EST. PATIENT-LVL IV: ICD-10-PCS | Mod: PBBFAC,,, | Performed by: NURSE PRACTITIONER

## 2021-11-10 PROCEDURE — 69210 REMOVE IMPACTED EAR WAX UNI: CPT | Mod: PBBFAC,PO,LT | Performed by: NURSE PRACTITIONER

## 2021-11-10 PROCEDURE — 99999 PR PBB SHADOW E&M-EST. PATIENT-LVL IV: CPT | Mod: PBBFAC,,, | Performed by: NURSE PRACTITIONER

## 2021-11-10 PROCEDURE — 69210 REMOVE IMPACTED EAR WAX UNI: CPT | Mod: S$PBB,,, | Performed by: NURSE PRACTITIONER

## 2021-11-10 PROCEDURE — 99215 PR OFFICE/OUTPT VISIT, EST, LEVL V, 40-54 MIN: ICD-10-PCS | Mod: 25,S$PBB,, | Performed by: NURSE PRACTITIONER

## 2021-11-10 PROCEDURE — 87070 CULTURE OTHR SPECIMN AEROBIC: CPT | Performed by: NURSE PRACTITIONER

## 2021-11-10 PROCEDURE — 69210 PR REMOVAL IMPACTED CERUMEN REQUIRING INSTRUMENTATION, UNILATERAL: ICD-10-PCS | Mod: S$PBB,,, | Performed by: NURSE PRACTITIONER

## 2021-11-10 PROCEDURE — 99215 OFFICE O/P EST HI 40 MIN: CPT | Mod: 25,S$PBB,, | Performed by: NURSE PRACTITIONER

## 2021-11-10 RX ORDER — CIPROFLOXACIN AND DEXAMETHASONE 3; 1 MG/ML; MG/ML
4 SUSPENSION/ DROPS AURICULAR (OTIC) 2 TIMES DAILY
Qty: 10 ML | Refills: 0 | Status: SHIPPED | OUTPATIENT
Start: 2021-11-10 | End: 2021-11-20

## 2021-11-10 RX ORDER — CYCLOBENZAPRINE HCL 5 MG
5 TABLET ORAL NIGHTLY
Qty: 30 TABLET | Refills: 0 | Status: SHIPPED | OUTPATIENT
Start: 2021-11-10 | End: 2021-12-10

## 2021-11-13 LAB — BACTERIA SPEC AEROBE CULT: NORMAL

## 2021-11-18 ENCOUNTER — PATIENT OUTREACH (OUTPATIENT)
Dept: ADMINISTRATIVE | Facility: HOSPITAL | Age: 66
End: 2021-11-18
Payer: MEDICARE

## 2021-11-18 ENCOUNTER — PATIENT MESSAGE (OUTPATIENT)
Dept: ADMINISTRATIVE | Facility: HOSPITAL | Age: 66
End: 2021-11-18
Payer: MEDICARE

## 2021-11-22 ENCOUNTER — SPECIALTY PHARMACY (OUTPATIENT)
Dept: PHARMACY | Facility: CLINIC | Age: 66
End: 2021-11-22
Payer: MEDICARE

## 2021-12-21 ENCOUNTER — OFFICE VISIT (OUTPATIENT)
Dept: FAMILY MEDICINE | Facility: CLINIC | Age: 66
End: 2021-12-21
Payer: MEDICARE

## 2021-12-21 VITALS
WEIGHT: 181 LBS | DIASTOLIC BLOOD PRESSURE: 70 MMHG | HEART RATE: 83 BPM | SYSTOLIC BLOOD PRESSURE: 110 MMHG | HEIGHT: 70 IN | OXYGEN SATURATION: 95 % | BODY MASS INDEX: 25.91 KG/M2

## 2021-12-21 DIAGNOSIS — M25.512 CHRONIC PAIN OF BOTH SHOULDERS: Primary | ICD-10-CM

## 2021-12-21 DIAGNOSIS — I10 ESSENTIAL HYPERTENSION: ICD-10-CM

## 2021-12-21 DIAGNOSIS — Z12.5 PROSTATE CANCER SCREENING: ICD-10-CM

## 2021-12-21 DIAGNOSIS — E78.2 MIXED HYPERLIPIDEMIA: ICD-10-CM

## 2021-12-21 DIAGNOSIS — M25.511 CHRONIC PAIN OF BOTH SHOULDERS: Primary | ICD-10-CM

## 2021-12-21 DIAGNOSIS — Z86.73 HISTORY OF CVA (CEREBROVASCULAR ACCIDENT): ICD-10-CM

## 2021-12-21 DIAGNOSIS — G89.29 CHRONIC PAIN OF BOTH SHOULDERS: Primary | ICD-10-CM

## 2021-12-21 PROCEDURE — 99999 PR PBB SHADOW E&M-EST. PATIENT-LVL IV: CPT | Mod: PBBFAC,,, | Performed by: INTERNAL MEDICINE

## 2021-12-21 PROCEDURE — 99214 OFFICE O/P EST MOD 30 MIN: CPT | Mod: S$PBB,,, | Performed by: INTERNAL MEDICINE

## 2021-12-21 PROCEDURE — 99214 OFFICE O/P EST MOD 30 MIN: CPT | Mod: PBBFAC,PO | Performed by: INTERNAL MEDICINE

## 2021-12-21 PROCEDURE — 99214 PR OFFICE/OUTPT VISIT, EST, LEVL IV, 30-39 MIN: ICD-10-PCS | Mod: S$PBB,,, | Performed by: INTERNAL MEDICINE

## 2021-12-21 PROCEDURE — 99999 PR PBB SHADOW E&M-EST. PATIENT-LVL IV: ICD-10-PCS | Mod: PBBFAC,,, | Performed by: INTERNAL MEDICINE

## 2021-12-22 ENCOUNTER — SPECIALTY PHARMACY (OUTPATIENT)
Dept: PHARMACY | Facility: CLINIC | Age: 66
End: 2021-12-22
Payer: MEDICARE

## 2021-12-29 DIAGNOSIS — M25.519 SHOULDER PAIN, UNSPECIFIED CHRONICITY, UNSPECIFIED LATERALITY: Primary | ICD-10-CM

## 2022-01-06 ENCOUNTER — OFFICE VISIT (OUTPATIENT)
Dept: ORTHOPEDICS | Facility: CLINIC | Age: 67
End: 2022-01-06
Payer: MEDICARE

## 2022-01-06 ENCOUNTER — HOSPITAL ENCOUNTER (OUTPATIENT)
Dept: RADIOLOGY | Facility: HOSPITAL | Age: 67
Discharge: HOME | End: 2022-01-06
Attending: ORTHOPAEDIC SURGERY
Payer: MEDICARE

## 2022-01-06 VITALS — HEIGHT: 70 IN | BODY MASS INDEX: 25.91 KG/M2 | WEIGHT: 181 LBS

## 2022-01-06 DIAGNOSIS — M25.512 CHRONIC PAIN OF BOTH SHOULDERS: ICD-10-CM

## 2022-01-06 DIAGNOSIS — M25.519 SHOULDER PAIN, UNSPECIFIED CHRONICITY, UNSPECIFIED LATERALITY: ICD-10-CM

## 2022-01-06 DIAGNOSIS — M12.811 ROTATOR CUFF ARTHROPATHY OF BOTH SHOULDERS: Primary | ICD-10-CM

## 2022-01-06 DIAGNOSIS — G89.29 CHRONIC PAIN OF BOTH SHOULDERS: ICD-10-CM

## 2022-01-06 DIAGNOSIS — M25.511 CHRONIC PAIN OF BOTH SHOULDERS: ICD-10-CM

## 2022-01-06 DIAGNOSIS — M12.812 ROTATOR CUFF ARTHROPATHY OF BOTH SHOULDERS: Primary | ICD-10-CM

## 2022-01-06 PROCEDURE — 99999 PR PBB SHADOW E&M-EST. PATIENT-LVL IV: CPT | Mod: PBBFAC,,, | Performed by: ORTHOPAEDIC SURGERY

## 2022-01-06 PROCEDURE — 99214 OFFICE O/P EST MOD 30 MIN: CPT | Mod: 25,S$PBB,, | Performed by: ORTHOPAEDIC SURGERY

## 2022-01-06 PROCEDURE — 96372 THER/PROPH/DIAG INJ SC/IM: CPT | Mod: PBBFAC,PN | Performed by: ORTHOPAEDIC SURGERY

## 2022-01-06 PROCEDURE — 99214 PR OFFICE/OUTPT VISIT, EST, LEVL IV, 30-39 MIN: ICD-10-PCS | Mod: 25,S$PBB,, | Performed by: ORTHOPAEDIC SURGERY

## 2022-01-06 PROCEDURE — 20610 DRAIN/INJ JOINT/BURSA W/O US: CPT | Mod: PBBFAC,PN | Performed by: ORTHOPAEDIC SURGERY

## 2022-01-06 PROCEDURE — 99999 PR PBB SHADOW E&M-EST. PATIENT-LVL IV: ICD-10-PCS | Mod: PBBFAC,,, | Performed by: ORTHOPAEDIC SURGERY

## 2022-01-06 PROCEDURE — 99214 OFFICE O/P EST MOD 30 MIN: CPT | Mod: PBBFAC,PN,25 | Performed by: ORTHOPAEDIC SURGERY

## 2022-01-06 RX ORDER — TRIAMCINOLONE ACETONIDE 40 MG/ML
80 INJECTION, SUSPENSION INTRA-ARTICULAR; INTRAMUSCULAR
Status: DISCONTINUED | OUTPATIENT
Start: 2022-01-06 | End: 2022-01-06 | Stop reason: HOSPADM

## 2022-01-06 RX ORDER — HYDROCODONE BITARTRATE AND ACETAMINOPHEN 5; 325 MG/1; MG/1
1 TABLET ORAL EVERY 6 HOURS PRN
Qty: 22 TABLET | Refills: 0 | Status: SHIPPED | OUTPATIENT
Start: 2022-01-06 | End: 2022-02-02 | Stop reason: SDUPTHER

## 2022-01-06 RX ADMIN — TRIAMCINOLONE ACETONIDE 80 MG: 40 INJECTION, SUSPENSION INTRA-ARTICULAR; INTRAMUSCULAR at 03:01

## 2022-01-06 NOTE — PROCEDURES
Large Joint Aspiration/Injection: R subacromial bursa    Date/Time: 1/6/2022 3:00 PM  Performed by: Nelson Silva MD  Authorized by: Nelson Silva MD     Consent Done?:  Yes (Verbal)  Site marked: the procedure site was marked    Prep: patient was prepped and draped in usual sterile fashion      Details:  Needle Size:  21 G  Approach:  Posterior  Location:  Shoulder  Site:  R subacromial bursa  Medications:  80 mg triamcinolone acetonide 40 mg/mL  Patient tolerance:  Patient tolerated the procedure well with no immediate complications

## 2022-01-06 NOTE — PROGRESS NOTES
66 years old right greater than left shoulder pain no new have rotator cuff arthropathy from MRI would last given an injection over a year ago responded well requesting injection today into his right shoulder also requesting oral pain medications for his chronic back pain which will give him a 1 time basis    Exam shows weak and painful cuff strength no signs infection    Imaging reveals rotator cuff arthropathy    Assessment:  Right shoulder rotator cuff arthropathy    Plan:  Kenalog injection right shoulder, continue with shoulder strengthening type exercises, follow-up as needed    Imaging studies ordered and reviewed by me    Further History  Aching pain  Worse with activity  Relieved with rest  No other associated symptoms  No other radiation    Further Exam  Alert and oriented  Pleasant  Contralateral limb has appropriate range of motion for age and condition  Contralateral limb has appropriate strength for age and condition  Contralateral limb has appropriate stability  for age and condition  No adenopathy  Pulses are appropriate for current condition  Skin is intact        Chief Complaint    Chief Complaint   Patient presents with    Left Shoulder - Pain    Right Shoulder - Pain       HPI  Edy Bartlett III is a 66 y.o.  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    Cerebellar stroke     Complication of anesthesia     gets very sore neck post-op if his head is twisted or bent too far with intubation    DDD (degenerative disc disease)     DDD (degenerative disc disease), cervical     DDD (degenerative disc disease), lumbar     DJD (degenerative joint disease) of knee 12/10/2012    ED (erectile dysfunction)     Elevated PSA     Hyperlipidemia     Hypertension     Mass of skin 2012    left temple, possible lipoma    Mobility impaired      cane    Neuropathy     feet    OA (osteoarthritis of spine)        Past Surgical History  Past Surgical History:   Procedure Laterality Date    APPENDECTOMY      CARPAL TUNNEL RELEASE      both hands/ lt hand x 2/ rt 1    CERVICAL SPINE SURGERY      x 2;fusion C3-7, can only bend his neck a little    EPIDURAL BLOCK INJECTION      injection for pain    FRACTURE SURGERY      ankle    hematoma removed  2006    thigh    HERNIA REPAIR      R inguinal    KNEE ARTHROSCOPY      PROSTATE BIOPSY      TENDON REPAIR      TRIGGER FINGER RELEASE  2007    left    TUMOR EXCISION      fatty tumor to left front of head, has since grown back    UNICOMPARTMENTAL ARTHROPLASTY OF KNEE Left 9/10/2019    Procedure: ARTHROPLASTY, KNEE, UNICOMPARTMENTAL;  Surgeon: Nelson Silva MD;  Location: [facility] OR;  Service: Orthopedics;  Laterality: Left;    urolift         Medications  Current Outpatient Medications   Medication Sig    alirocumab (PRALUENT PEN) 75 mg/mL PnIj Inject 1 mL (75 mg total) into the skin every 14 (fourteen) days.    aspirin (ECOTRIN) 81 MG EC tablet TAKE 1 TABLET BY MOUTH EVERY DAY    azelastine (ASTELIN) 137 mcg (0.1 %) nasal spray 1 spray (137 mcg total) by Nasal route 2 (two) times daily.    ciprofloxacin HCl (CILOXAN) 0.3 % ophthalmic solution 5 drops to left EAR twice daily X 10 days (Patient not taking: Reported on 12/21/2021)    clopidogreL (PLAVIX) 75 mg tablet TAKE 1 TABLET BY MOUTH EVERY DAY    cyanocobalamin (VITAMIN B-12) 1000 MCG tablet Take 1 tablet (1,000 mcg total) by mouth once daily. (Patient not taking: Reported on 12/21/2021)    FOLBEE 2.5-25-1 mg Tab TAKE 1 TABLET BY MOUTH EVERY DAY    gemfibroziL (LOPID) 600 MG tablet TAKE 1 TABLET (600 MG TOTAL) BY MOUTH 2 (TWO) TIMES DAILY BEFORE MEALS (Patient not taking: Reported on 12/21/2021)    ibuprofen (ADVIL,MOTRIN) 200 MG tablet Take by mouth every 6 (six) hours as needed.    lisinopriL 10 MG tablet TAKE 1 TABLET BY MOUTH EVERY DAY     pantoprazole (PROTONIX) 40 MG tablet Take 40 mg by mouth.    sildenafiL (VIAGRA) 50 MG tablet Take 1 tablet (50 mg total) by mouth daily as needed for Erectile Dysfunction. (Patient not taking: No sig reported)    triamcinolone acetonide 0.1% (KENALOG) 0.1 % cream Apply topically.    zolpidem (AMBIEN) 5 MG Tab Take 1 tablet (5 mg total) by mouth nightly as needed.     No current facility-administered medications for this visit.       Allergies  Review of patient's allergies indicates:   Allergen Reactions    Statins-hmg-coa reductase inhibitors Other (See Comments)     Not allergy, makes him feel awful       Family History  Family History   Problem Relation Age of Onset    Hyperlipidemia Mother     Heart disease Mother     Diabetes Son     Diabetes type II Maternal Aunt     Prostate cancer Brother        Social History  Social History     Socioeconomic History    Marital status:    Occupational History     Employer:  [employer]   Tobacco Use    Smoking status: Never Smoker    Smokeless tobacco: Never Used   Substance and Sexual Activity    Alcohol use: No    Drug use: No    Sexual activity: Not Currently               Review of Systems     Constitutional: Negative    HENT: Negative  Eyes: Negative  Respiratory: Negative  Cardiovascular: Negative  Musculoskeletal: HPI  Skin: Negative  Neurological: Negative  Hematological: Negative  Endocrine: Negative                 Physical Exam    There were no vitals filed for this visit.  Body mass index is 25.97 kg/m².  Physical Examination:     General appearance -  well appearing, and in no distress  Mental status - awake  Neck - supple  Chest -  symmetric air entry  Heart - normal rate   Abdomen - soft      Assessment     1. Rotator cuff arthropathy of both shoulders    2. Chronic pain of both shoulders          Plan

## 2022-01-12 ENCOUNTER — TELEPHONE (OUTPATIENT)
Dept: FAMILY MEDICINE | Facility: CLINIC | Age: 67
End: 2022-01-12
Payer: MEDICARE

## 2022-01-12 ENCOUNTER — TELEPHONE (OUTPATIENT)
Dept: SPINE | Facility: CLINIC | Age: 67
End: 2022-01-12
Payer: MEDICARE

## 2022-01-12 NOTE — TELEPHONE ENCOUNTER
I spoke with Mr. Bartlett and let him know that Shira Goff is out this week because she has had a death in her family. He asked to see someone that would be able to give him an injection in his back and I told him Shira does not do any injections or procedures. I told him those things are done by pain management and that he has seen Dr. Parham in the recent past. He said I should have a nice day and hung up the phone.

## 2022-01-12 NOTE — TELEPHONE ENCOUNTER
----- Message from Ana Cosby sent at 1/12/2022  3:52 PM CST -----  Type: Needs Medical Advice  Who Called:  pt  Best Call Back Number: [phone] (home)     Additional Information: pt needs to speak to someone in regards to getting his papers for disability filled out please call to advise.

## 2022-01-12 NOTE — TELEPHONE ENCOUNTER
----- Message from Ana Cosby sent at 1/12/2022  3:50 PM CST -----  Type:  Sooner Apoointment Request    Caller is requesting a sooner appointment.  Caller declined first available appointment listed below.  Caller will not accept being placed on the waitlist and is requesting a message be sent to doctor.    Name of Caller:  pt  When is the first available appointment?  1/25  Symptoms:  patient says he is hurting and needs to be seen sooner  Best Call Back Number:  [phone] (home)     Additional Information:

## 2022-01-25 ENCOUNTER — SPECIALTY PHARMACY (OUTPATIENT)
Dept: PHARMACY | Facility: CLINIC | Age: 67
End: 2022-01-25
Payer: MEDICARE

## 2022-01-25 NOTE — TELEPHONE ENCOUNTER
Specialty Pharmacy - Refill Coordination    Specialty Medication Orders Linked to Encounter    Flowsheet Row Most Recent Value   Medication #1 alirocumab (PRALUENT PEN) 75 mg/mL PnIj (Order#[id number], Rx#[id number])          Refill Questions - Documented Responses    Flowsheet Row Most Recent Value   Patient Availability and HIPAA Verification    Does patient want to proceed with activity? Yes   HIPAA/medical authority confirmed? Yes   Relationship to patient of person spoken to? Self   Refill Screening Questions    Would patient like to speak to a pharmacist? No   When does the patient need to receive the medication? 02/01/22   Refill Delivery Questions    How will the patient receive the medication? Delivery Marsha   When does the patient need to receive the medication? 02/01/22   Shipping Address Home   Address in [facility] confirmed and updated if neccessary? Yes   Expected Copay ($) 25   Is the patient able to afford the medication copay? Yes   Payment Method new CC added to file   Days supply of Refill 28   Refill activity completed? Yes   Refill activity plan Refill scheduled   Shipment/Pickup Date: 01/28/22          Current Outpatient Medications   Medication Sig    alirocumab (PRALUENT PEN) 75 mg/mL PnIj Inject 1 mL (75 mg total) into the skin every 14 (fourteen) days.    aspirin (ECOTRIN) 81 MG EC tablet TAKE 1 TABLET BY MOUTH EVERY DAY    azelastine (ASTELIN) 137 mcg (0.1 %) nasal spray 1 spray (137 mcg total) by Nasal route 2 (two) times daily.    ciprofloxacin HCl (CILOXAN) 0.3 % ophthalmic solution 5 drops to left EAR twice daily X 10 days (Patient not taking: Reported on 12/21/2021)    clopidogreL (PLAVIX) 75 mg tablet TAKE 1 TABLET BY MOUTH EVERY DAY    cyanocobalamin (VITAMIN B-12) 1000 MCG tablet Take 1 tablet (1,000 mcg total) by mouth once daily. (Patient not taking: Reported on 12/21/2021)    FOLBEE 2.5-25-1 mg Tab TAKE 1 TABLET BY MOUTH EVERY DAY    gemfibroziL (LOPID) 600 MG  tablet TAKE 1 TABLET (600 MG TOTAL) BY MOUTH 2 (TWO) TIMES DAILY BEFORE MEALS (Patient not taking: Reported on 12/21/2021)    HYDROcodone-acetaminophen (NORCO) 5-325 mg per tablet Take 1 tablet by mouth every 6 (six) hours as needed for Pain.    ibuprofen (ADVIL,MOTRIN) 200 MG tablet Take by mouth every 6 (six) hours as needed.    lisinopriL 10 MG tablet TAKE 1 TABLET BY MOUTH EVERY DAY    pantoprazole (PROTONIX) 40 MG tablet Take 40 mg by mouth.    sildenafiL (VIAGRA) 50 MG tablet Take 1 tablet (50 mg total) by mouth daily as needed for Erectile Dysfunction. (Patient not taking: No sig reported)    triamcinolone acetonide 0.1% (KENALOG) 0.1 % cream Apply topically.    zolpidem (AMBIEN) 5 MG Tab Take 1 tablet (5 mg total) by mouth nightly as needed.   Last reviewed on 1/6/2022  3:23 PM by Nelson Silva MD    Review of patient's allergies indicates:   Allergen Reactions    Statins-hmg-coa reductase inhibitors Other (See Comments)     Not allergy, makes him feel awful    Last reviewed on  1/6/2022 3:23 PM by Nelson Silva      Tasks added this encounter   2/22/2022 - Refill Call (Auto Added)   Tasks due within next 3 months   No tasks due.     Chava Oden [facility]  [address] [facility] [address] [facility] [address]  Phone: [phone]  Fax: [phone]

## 2022-02-02 ENCOUNTER — OFFICE VISIT (OUTPATIENT)
Dept: FAMILY MEDICINE | Facility: CLINIC | Age: 67
End: 2022-02-02
Payer: MEDICARE

## 2022-02-02 VITALS
OXYGEN SATURATION: 97 % | WEIGHT: 174.38 LBS | HEIGHT: 70 IN | BODY MASS INDEX: 24.97 KG/M2 | HEART RATE: 69 BPM | SYSTOLIC BLOOD PRESSURE: 130 MMHG | DIASTOLIC BLOOD PRESSURE: 84 MMHG

## 2022-02-02 DIAGNOSIS — M54.50 ACUTE BILATERAL LOW BACK PAIN WITHOUT SCIATICA: Primary | ICD-10-CM

## 2022-02-02 DIAGNOSIS — R63.4 WEIGHT LOSS: ICD-10-CM

## 2022-02-02 PROBLEM — M12.811 ROTATOR CUFF ARTHROPATHY OF BOTH SHOULDERS: Status: ACTIVE | Noted: 2022-02-02

## 2022-02-02 PROBLEM — M12.812 ROTATOR CUFF ARTHROPATHY OF BOTH SHOULDERS: Status: ACTIVE | Noted: 2022-02-02

## 2022-02-02 PROCEDURE — 99999 PR PBB SHADOW E&M-EST. PATIENT-LVL III: ICD-10-PCS | Mod: PBBFAC,,, | Performed by: INTERNAL MEDICINE

## 2022-02-02 PROCEDURE — 99213 PR OFFICE/OUTPT VISIT, EST, LEVL III, 20-29 MIN: ICD-10-PCS | Mod: S$PBB,,, | Performed by: INTERNAL MEDICINE

## 2022-02-02 PROCEDURE — 99999 PR PBB SHADOW E&M-EST. PATIENT-LVL III: CPT | Mod: PBBFAC,,, | Performed by: INTERNAL MEDICINE

## 2022-02-02 PROCEDURE — 99213 OFFICE O/P EST LOW 20 MIN: CPT | Mod: PBBFAC,PO | Performed by: INTERNAL MEDICINE

## 2022-02-02 PROCEDURE — 99213 OFFICE O/P EST LOW 20 MIN: CPT | Mod: S$PBB,,, | Performed by: INTERNAL MEDICINE

## 2022-02-02 RX ORDER — HYDROCODONE BITARTRATE AND ACETAMINOPHEN 5; 325 MG/1; MG/1
1 TABLET ORAL EVERY 6 HOURS PRN
Qty: 30 TABLET | Refills: 0 | Status: SHIPPED | OUTPATIENT
Start: 2022-02-02 | End: 2023-07-26

## 2022-02-02 RX ORDER — TIZANIDINE 4 MG/1
4 TABLET ORAL 2 TIMES DAILY PRN
Qty: 60 TABLET | Refills: 0 | Status: SHIPPED | OUTPATIENT
Start: 2022-02-02 | End: 2022-02-26
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
## 2022-02-22 ENCOUNTER — SPECIALTY PHARMACY (OUTPATIENT)
Dept: PHARMACY | Facility: CLINIC | Age: 67
End: 2022-02-22
Payer: MEDICARE

## 2022-03-07 ENCOUNTER — TELEPHONE (OUTPATIENT)
Dept: FAMILY MEDICINE | Facility: CLINIC | Age: 67
End: 2022-03-07
Payer: MEDICARE

## 2022-03-07 NOTE — TELEPHONE ENCOUNTER
----- Message from Elvia Sal, Patient Care Assistant sent at 3/7/2022  4:29 PM CST -----  Regarding: appointment  Contact: pt  Type:  Sooner Apoointment Request    Caller is requesting a sooner appointment.  Caller declined first available appointment listed below.  Caller will not accept being placed on the waitlist and is requesting a message be sent to doctor.    Name of Caller:  pt   When is the first available appointment?  04/2022  Symptoms:  left shoulder pain   Best Call Back Number:  [phone] (home)     Additional Information:  please call pt to advise. Thanks!

## 2022-03-18 ENCOUNTER — TELEPHONE (OUTPATIENT)
Dept: FAMILY MEDICINE | Facility: CLINIC | Age: 67
End: 2022-03-18

## 2022-03-18 ENCOUNTER — HOSPITAL ENCOUNTER (OUTPATIENT)
Dept: RADIOLOGY | Facility: HOSPITAL | Age: 67
Discharge: HOME | End: 2022-03-18
Attending: INTERNAL MEDICINE
Payer: MEDICARE

## 2022-03-18 ENCOUNTER — OFFICE VISIT (OUTPATIENT)
Dept: FAMILY MEDICINE | Facility: CLINIC | Age: 67
End: 2022-03-18
Payer: MEDICARE

## 2022-03-18 VITALS
HEIGHT: 70 IN | BODY MASS INDEX: 24.91 KG/M2 | OXYGEN SATURATION: 98 % | HEART RATE: 85 BPM | DIASTOLIC BLOOD PRESSURE: 80 MMHG | SYSTOLIC BLOOD PRESSURE: 124 MMHG | WEIGHT: 174 LBS

## 2022-03-18 DIAGNOSIS — M25.512 ACUTE PAIN OF LEFT SHOULDER: Primary | ICD-10-CM

## 2022-03-18 DIAGNOSIS — M25.512 ACUTE PAIN OF LEFT SHOULDER: ICD-10-CM

## 2022-03-18 DIAGNOSIS — C11.9 NASOPHARYNGEAL CARCINOMA: ICD-10-CM

## 2022-03-18 PROCEDURE — 99999 PR PBB SHADOW E&M-EST. PATIENT-LVL V: ICD-10-PCS | Mod: PBBFAC,,, | Performed by: INTERNAL MEDICINE

## 2022-03-18 PROCEDURE — 99214 OFFICE O/P EST MOD 30 MIN: CPT | Mod: S$PBB,,, | Performed by: INTERNAL MEDICINE

## 2022-03-18 PROCEDURE — 99214 PR OFFICE/OUTPT VISIT, EST, LEVL IV, 30-39 MIN: ICD-10-PCS | Mod: S$PBB,,, | Performed by: INTERNAL MEDICINE

## 2022-03-18 PROCEDURE — 99999 PR PBB SHADOW E&M-EST. PATIENT-LVL V: CPT | Mod: PBBFAC,,, | Performed by: INTERNAL MEDICINE

## 2022-03-18 PROCEDURE — 73030 X-RAY EXAM OF SHOULDER: CPT | Mod: 26,LT,, | Performed by: RADIOLOGY

## 2022-03-18 PROCEDURE — 73030 X-RAY EXAM OF SHOULDER: CPT | Mod: TC,FY,PO,LT

## 2022-03-18 PROCEDURE — 73030 XR SHOULDER COMPLETE 2 OR MORE VIEWS LEFT: ICD-10-PCS | Mod: 26,LT,, | Performed by: RADIOLOGY

## 2022-03-18 PROCEDURE — 99215 OFFICE O/P EST HI 40 MIN: CPT | Mod: PBBFAC,PO | Performed by: INTERNAL MEDICINE
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
## 2022-03-18 NOTE — TELEPHONE ENCOUNTER
Left message for Janae to call back regarding pt        ----- Message from Zbigniew Uribe sent at 3/18/2022 12:36 PM CDT -----  Type:  Patient Returning Call    Who Called: Janae arrington/ Beryl Bird [facility]  Who Left Message for Patient:  Aileen  Does the patient know what this is regarding?:    Best Call Back Number:  [phone]   Additional Information:  please advise -- Thank you        
DC instructions

## 2022-03-22 ENCOUNTER — SPECIALTY PHARMACY (OUTPATIENT)
Dept: PHARMACY | Facility: CLINIC | Age: 67
End: 2022-03-22
Payer: MEDICARE

## 2022-03-22 NOTE — TELEPHONE ENCOUNTER
Specialty Pharmacy - Refill Coordination    Specialty Medication Orders Linked to Encounter    Flowsheet Row Most Recent Value   Medication #1 alirocumab (PRALUENT PEN) 75 mg/mL PnIj (Order#[id number], Rx#[id number])          Refill Questions - Documented Responses    Flowsheet Row Most Recent Value   Patient Availability and HIPAA Verification    Does patient want to proceed with activity? Yes   HIPAA/medical authority confirmed? Yes   Relationship to patient of person spoken to? Self   Refill Screening Questions    Would patient like to speak to a pharmacist? No   When does the patient need to receive the medication? 04/01/22   Refill Delivery Questions    How will the patient receive the medication? Delivery Marsha   When does the patient need to receive the medication? 04/01/22   Shipping Address Home   Address in [facility] confirmed and updated if neccessary? Yes   Expected Copay ($) 25   Is the patient able to afford the medication copay? Yes   Payment Method CC on file   Days supply of Refill 28   Refill activity completed? Yes   Refill activity plan Refill scheduled   Shipment/Pickup Date: 03/30/22          Current Outpatient Medications   Medication Sig    alirocumab (PRALUENT PEN) 75 mg/mL PnIj Inject 1 mL (75 mg total) into the skin every 14 (fourteen) days.    aspirin (ECOTRIN) 81 MG EC tablet TAKE 1 TABLET BY MOUTH EVERY DAY    azelastine (ASTELIN) 137 mcg (0.1 %) nasal spray 1 spray (137 mcg total) by Nasal route 2 (two) times daily.    ciprofloxacin HCl (CILOXAN) 0.3 % ophthalmic solution 5 drops to left EAR twice daily X 10 days    clopidogreL (PLAVIX) 75 mg tablet TAKE 1 TABLET BY MOUTH EVERY DAY    cyanocobalamin (VITAMIN B-12) 1000 MCG tablet Take 1 tablet (1,000 mcg total) by mouth once daily.    FOLBEE 2.5-25-1 mg Tab TAKE 1 TABLET BY MOUTH EVERY DAY    gemfibroziL (LOPID) 600 MG tablet TAKE 1 TABLET (600 MG TOTAL) BY MOUTH 2 (TWO) TIMES DAILY BEFORE MEALS     HYDROcodone-acetaminophen (NORCO) 5-325 mg per tablet Take 1 tablet by mouth every 6 (six) hours as needed for Pain.    ibuprofen (ADVIL,MOTRIN) 200 MG tablet Take by mouth every 6 (six) hours as needed.    lisinopriL 10 MG tablet TAKE 1 TABLET BY MOUTH EVERY DAY    pantoprazole (PROTONIX) 40 MG tablet Take 40 mg by mouth.    sildenafiL (VIAGRA) 50 MG tablet Take 1 tablet (50 mg total) by mouth daily as needed for Erectile Dysfunction. (Patient not taking: No sig reported)    tiZANidine (ZANAFLEX) 4 MG tablet TAKE 1 TABLET (4 MG TOTAL) BY MOUTH 2 (TWO) TIMES DAILY AS NEEDED (LUMBAGO).    triamcinolone acetonide 0.1% (KENALOG) 0.1 % cream Apply topically.    zolpidem (AMBIEN) 5 MG Tab Take 1 tablet (5 mg total) by mouth nightly as needed.   Last reviewed on 3/18/2022 10:11 AM by Huy Cordova MD    Review of patient's allergies indicates:   Allergen Reactions    Statins-hmg-coa reductase inhibitors Other (See Comments)     Not allergy, makes him feel awful    Last reviewed on  3/18/2022 10:11 AM by uHy Cordova      Tasks added this encounter   4/22/2022 - Refill Call (Auto Added)   Tasks due within next 3 months   No tasks due.     Chava Oden [facility]  [address] [address]  Phone: [phone]  Fax: [phone]

## 2022-03-23 ENCOUNTER — TELEPHONE (OUTPATIENT)
Dept: FAMILY MEDICINE | Facility: CLINIC | Age: 67
End: 2022-03-23
Payer: MEDICARE

## 2022-03-23 NOTE — TELEPHONE ENCOUNTER
Spoke with Janae at Dr. Austin office. She stated that Dr. Austin is wanting results of patients TSH. Advised her he has not had a TSH drawn since 2015. She requested that most recent labs and office note be sent.     Spoke with patient, verified that he is seeing Dr. Austin. He stated it was ok to send them his records

## 2022-03-23 NOTE — TELEPHONE ENCOUNTER
----- Message from Jacquie Fernando sent at 3/23/2022 11:45 AM CDT -----  Contact: tona  Type: Return Call    Who Called: Nurse     Who Left Message: Nevaeh     Does the patient know what this is regarding:  Tona at [facility]     Best Call Back Number: [phone] or e mail at [email or web address]  Thank you~

## 2022-03-23 NOTE — TELEPHONE ENCOUNTER
----- Message from Matilda Mendez sent at 3/23/2022 11:26 AM CDT -----  Regarding: Nurse Brothers with Dr. Adrian Hewitt's Office called to request TSA Lab Result faxed to [phone]  Patient Advice:    Nurse Brothers with Dr. Adrian Hewitt's Office called to request TSA Lab Result faxed to [phone]    Nurse Brothers can be reached at [phone]

## 2022-03-23 NOTE — TELEPHONE ENCOUNTER
----- Message from Jacquie Fernando sent at 3/23/2022 11:36 AM CDT -----  Contact: Janae  Type: Return Call    Who Called:  Nurse     Who Left Message:  Nevaeh     Does the patient know what this is regarding:  Janae from [facility]     Best Call Back Number:  [phone]   Thank you~

## 2022-03-27 NOTE — PLAN OF CARE
Meets criteria for discharge  
VSS, BP elevated. pt very anxious and restless. Dr. Wise notified. all questions answered. Denies recent fever or illness. Pt states ready for procedure.  
Imani Crane

## 2022-03-28 DIAGNOSIS — E78.2 MIXED HYPERLIPIDEMIA: ICD-10-CM

## 2022-03-28 RX ORDER — ALIROCUMAB 75 MG/ML
75 INJECTION, SOLUTION SUBCUTANEOUS
Qty: 2 ML | Refills: 12 | Status: SHIPPED | OUTPATIENT
Start: 2022-03-28 | End: 2023-07-26 | Stop reason: SDUPTHER

## 2022-03-31 ENCOUNTER — OFFICE VISIT (OUTPATIENT)
Dept: FAMILY MEDICINE | Facility: CLINIC | Age: 67
End: 2022-03-31
Payer: MEDICARE

## 2022-03-31 VITALS
OXYGEN SATURATION: 98 % | BODY MASS INDEX: 25.73 KG/M2 | DIASTOLIC BLOOD PRESSURE: 82 MMHG | WEIGHT: 179.69 LBS | HEART RATE: 82 BPM | SYSTOLIC BLOOD PRESSURE: 124 MMHG | HEIGHT: 70 IN

## 2022-03-31 DIAGNOSIS — H60.332 ACUTE SWIMMER'S EAR OF LEFT SIDE: ICD-10-CM

## 2022-03-31 DIAGNOSIS — M25.512 ACUTE PAIN OF LEFT SHOULDER: Primary | ICD-10-CM

## 2022-03-31 PROCEDURE — 99213 OFFICE O/P EST LOW 20 MIN: CPT | Mod: S$PBB,,, | Performed by: INTERNAL MEDICINE

## 2022-03-31 PROCEDURE — 99999 PR PBB SHADOW E&M-EST. PATIENT-LVL IV: CPT | Mod: PBBFAC,,, | Performed by: INTERNAL MEDICINE

## 2022-03-31 PROCEDURE — 99213 PR OFFICE/OUTPT VISIT, EST, LEVL III, 20-29 MIN: ICD-10-PCS | Mod: S$PBB,,, | Performed by: INTERNAL MEDICINE

## 2022-03-31 PROCEDURE — 99999 PR PBB SHADOW E&M-EST. PATIENT-LVL IV: ICD-10-PCS | Mod: PBBFAC,,, | Performed by: INTERNAL MEDICINE

## 2022-03-31 PROCEDURE — 99214 OFFICE O/P EST MOD 30 MIN: CPT | Mod: PBBFAC,PO | Performed by: INTERNAL MEDICINE

## 2022-03-31 RX ORDER — CIPROFLOXACIN AND DEXAMETHASONE 3; 1 MG/ML; MG/ML
4 SUSPENSION/ DROPS AURICULAR (OTIC) 2 TIMES DAILY
Qty: 7.5 ML | Refills: 0 | Status: SHIPPED | OUTPATIENT
Start: 2022-03-31 | End: 2023-07-26

## 2022-03-31 NOTE — PROGRESS NOTES
Patient ID: Edy Bartlett III     Chief Complaint:   Chief Complaint   Patient presents with    Arm Pain     Left arm        HPI:  Follow-up for left upper extremity pain.  We did an x-ray at his last office visit which did not show any bony abnormalities but I did also order an MRI thinking that he may have torn a tendon or even a muscle.  He presents today with continued pain in his left upper extremity and extensive bruising over the course of his biceps down to his antecubital fossa, so I really do think he has it or muscle tear or tendon tear.  Thankfully he is scheduled for an MRI in a few days and we will get the bottom of that.  He does have some hydrocodone at home to take when the pain gets really bad.  Since our last office visit he did see his radiation oncologist, Dr. Hewitt, at [facility] in [place].  When he complained about pain in his mandible Dr. Austin ordered a bone scan, but we do not know when this is going to happen. He also Complains of brown discharge on his pillow in the morning x few days. Physical Exam Consistent with Left otitis externa but I can't tell if his tympanic membrane is erythematous. Will treat with Cirpodex and Recheck in a few weeks.     Review of Systems   Constitutional: Negative.    HENT: Negative.    Eyes: Negative.    Respiratory: Negative.    Cardiovascular: Negative.    Gastrointestinal: Negative.    Endocrine: Negative.    Genitourinary: Negative.    Musculoskeletal: Positive for arthralgias.   Skin: Negative.    Allergic/Immunologic: Negative.    Neurological: Negative.    Hematological: Negative.    Psychiatric/Behavioral: Negative.           Objective:      Physical Exam   Physical Exam  Vitals and nursing note reviewed.   Constitutional:       Appearance: Normal appearance. He is well-developed.   HENT:      Head: Normocephalic and atraumatic.      Nose: Nose normal.   Eyes:      Extraocular Movements: Extraocular movements intact.       "Conjunctiva/sclera: Conjunctivae normal.      Pupils: Pupils are equal, round, and reactive to light.   Cardiovascular:      Rate and Rhythm: Normal rate and regular rhythm.      Pulses: Normal pulses.      Heart sounds: Normal heart sounds.   Pulmonary:      Effort: Pulmonary effort is normal.      Breath sounds: Normal breath sounds.   Abdominal:      General: Bowel sounds are normal.      Palpations: Abdomen is soft.   Musculoskeletal:      Cervical back: Normal range of motion and neck supple.      Comments: Decreased Range of Motion in Left Upper Extremity due to pain with extensive bruising over Left bicep down to AC fossa    Skin:     General: Skin is warm and dry.      Capillary Refill: Capillary refill takes less than 2 seconds.   Neurological:      General: No focal deficit present.      Mental Status: He is alert and oriented to person, place, and time.   Psychiatric:         Mood and Affect: Mood normal.         Behavior: Behavior normal.         Thought Content: Thought content normal.         Judgment: Judgment normal.            Vitals:   Vitals:    03/31/22 1043   BP: 124/82   Pulse: 82   SpO2: 98%   Weight: 81.5 kg (179 lb 10.8 oz)   Height: 5' 10" (1.778 m)          Current Outpatient Medications:     alirocumab (PRALUENT PEN) 75 mg/mL PnIj, Inject 1 mL (75 mg total) into the skin every 14 (fourteen) days., Disp: 2 mL, Rfl: 12    aspirin (ECOTRIN) 81 MG EC tablet, TAKE 1 TABLET BY MOUTH EVERY DAY, Disp: 90 tablet, Rfl: 3    ciprofloxacin HCl (CILOXAN) 0.3 % ophthalmic solution, 5 drops to left EAR twice daily X 10 days, Disp: 10 mL, Rfl: 0    clopidogreL (PLAVIX) 75 mg tablet, TAKE 1 TABLET BY MOUTH EVERY DAY, Disp: 90 tablet, Rfl: 3    cyanocobalamin (VITAMIN B-12) 1000 MCG tablet, Take 1 tablet (1,000 mcg total) by mouth once daily., Disp: 90 tablet, Rfl: 3    FOLBEE 2.5-25-1 mg Tab, TAKE 1 TABLET BY MOUTH EVERY DAY, Disp: 90 tablet, Rfl: 3    gemfibroziL (LOPID) 600 MG tablet, TAKE 1 " TABLET (600 MG TOTAL) BY MOUTH 2 (TWO) TIMES DAILY BEFORE MEALS, Disp: 60 tablet, Rfl: 0    HYDROcodone-acetaminophen (NORCO) 5-325 mg per tablet, Take 1 tablet by mouth every 6 (six) hours as needed for Pain., Disp: 30 tablet, Rfl: 0    ibuprofen (ADVIL,MOTRIN) 200 MG tablet, Take by mouth every 6 (six) hours as needed., Disp: , Rfl:     lisinopriL 10 MG tablet, TAKE 1 TABLET BY MOUTH EVERY DAY, Disp: 90 tablet, Rfl: 3    triamcinolone acetonide 0.1% (KENALOG) 0.1 % cream, Apply topically., Disp: , Rfl:     azelastine (ASTELIN) 137 mcg (0.1 %) nasal spray, 1 spray (137 mcg total) by Nasal route 2 (two) times daily., Disp: 30 mL, Rfl: 12    ciprofloxacin-dexamethasone 0.3-0.1% (CIPRODEX) 0.3-0.1 % DrpS, Place 4 drops into the left ear 2 (two) times daily., Disp: 7.5 mL, Rfl: 0    pantoprazole (PROTONIX) 40 MG tablet, Take 40 mg by mouth., Disp: , Rfl:     sildenafiL (VIAGRA) 50 MG tablet, Take 1 tablet (50 mg total) by mouth daily as needed for Erectile Dysfunction. (Patient not taking: No sig reported), Disp: 10 tablet, Rfl: 3    zolpidem (AMBIEN) 5 MG Tab, Take 1 tablet (5 mg total) by mouth nightly as needed., Disp: 30 tablet, Rfl: 3   Assessment:       Patient Active Problem List    Diagnosis Date Noted    Rotator cuff arthropathy of both shoulders 02/02/2022    History of CVA (cerebrovascular accident) 06/02/2021    Insomnia due to medical condition 02/01/2021    Frequent falls 08/14/2020    Cerebrovascular accident (CVA) due to occlusion of left cerebellar artery 05/08/2020    Cervicalgia 05/08/2020    Upper airway cough syndrome 03/27/2020    Degenerative disc disease, cervical 03/27/2020    B12 deficiency 03/03/2020    Folate deficiency 03/03/2020    Xerostomia due to radiotherapy 02/20/2020    Dry skin dermatitis 02/20/2020    Bilateral sensorineural hearing loss 01/23/2017    Tinnitus of both ears 01/23/2017    T1N0M0 SCCA L nasopharynx 04/07/2015    Benign prostatic hyperplasia  with urinary obstruction     DJD (degenerative joint disease) of knee 12/10/2012    Neuropathy     Essential hypertension     Mixed hyperlipidemia     ED (erectile dysfunction)           Plan:       Edy Bartlett III  was seen today for follow-up and may need lab work.    Diagnoses and all orders for this visit:    Edy was seen today for arm pain.    Diagnoses and all orders for this visit:    Acute pain of left shoulder  MRI Scheduled soon   He has pain meds at home      Left Otitis Externa  CiproDex x 7 days and Recheck tympanic membrane

## 2022-04-04 ENCOUNTER — HOSPITAL ENCOUNTER (OUTPATIENT)
Dept: RADIOLOGY | Facility: HOSPITAL | Age: 67
Discharge: HOME | End: 2022-04-04
Attending: INTERNAL MEDICINE
Payer: MEDICARE

## 2022-04-04 DIAGNOSIS — M25.512 ACUTE PAIN OF LEFT SHOULDER: ICD-10-CM

## 2022-04-04 PROCEDURE — 73221 MRI JOINT UPR EXTREM W/O DYE: CPT | Mod: TC,PO,LT

## 2022-04-04 PROCEDURE — 73221 MRI SHOULDER WITHOUT CONTRAST LEFT: ICD-10-PCS | Mod: 26,LT,, | Performed by: RADIOLOGY

## 2022-04-04 PROCEDURE — 73221 MRI JOINT UPR EXTREM W/O DYE: CPT | Mod: 26,LT,, | Performed by: RADIOLOGY

## 2022-04-10 ENCOUNTER — PATIENT OUTREACH (OUTPATIENT)
Dept: ADMINISTRATIVE | Facility: OTHER | Age: 67
End: 2022-04-10
Payer: MEDICARE

## 2022-04-10 NOTE — PROGRESS NOTES
LINKS immunization registry updated  Care Everywhere updated  Health Maintenance updated  Chart reviewed for overdue Proactive [facility] Encounters (ANTHONY) health maintenance testing (CRS, Breast Ca, Diabetic Eye Exam)   Orders entered:N/A

## 2022-04-14 ENCOUNTER — OFFICE VISIT (OUTPATIENT)
Dept: FAMILY MEDICINE | Facility: CLINIC | Age: 67
End: 2022-04-14
Payer: MEDICARE

## 2022-04-14 VITALS
HEART RATE: 72 BPM | HEIGHT: 70 IN | OXYGEN SATURATION: 95 % | WEIGHT: 179 LBS | SYSTOLIC BLOOD PRESSURE: 122 MMHG | DIASTOLIC BLOOD PRESSURE: 80 MMHG | BODY MASS INDEX: 25.62 KG/M2

## 2022-04-14 DIAGNOSIS — S46.219A TEAR OF BICEPS TENDON: ICD-10-CM

## 2022-04-14 DIAGNOSIS — H66.005 RECURRENT ACUTE SUPPURATIVE OTITIS MEDIA WITHOUT SPONTANEOUS RUPTURE OF LEFT TYMPANIC MEMBRANE: Primary | ICD-10-CM

## 2022-04-14 PROCEDURE — 99999 PR PBB SHADOW E&M-EST. PATIENT-LVL IV: CPT | Mod: PBBFAC,,, | Performed by: INTERNAL MEDICINE

## 2022-04-14 PROCEDURE — 99213 PR OFFICE/OUTPT VISIT, EST, LEVL III, 20-29 MIN: ICD-10-PCS | Mod: S$PBB,,, | Performed by: INTERNAL MEDICINE

## 2022-04-14 PROCEDURE — 99214 OFFICE O/P EST MOD 30 MIN: CPT | Mod: PBBFAC,PO | Performed by: INTERNAL MEDICINE

## 2022-04-14 PROCEDURE — 99213 OFFICE O/P EST LOW 20 MIN: CPT | Mod: S$PBB,,, | Performed by: INTERNAL MEDICINE

## 2022-04-14 PROCEDURE — 99999 PR PBB SHADOW E&M-EST. PATIENT-LVL IV: ICD-10-PCS | Mod: PBBFAC,,, | Performed by: INTERNAL MEDICINE

## 2022-04-14 RX ORDER — AMOXICILLIN AND CLAVULANATE POTASSIUM 875; 125 MG/1; MG/1
1 TABLET, FILM COATED ORAL EVERY 12 HOURS
Qty: 14 TABLET | Refills: 0 | Status: SHIPPED | OUTPATIENT
Start: 2022-04-14 | End: 2022-04-21

## 2022-04-14 NOTE — PROGRESS NOTES
Patient ID: Edy Bartlett III     Chief Complaint:   Chief Complaint   Patient presents with    Otalgia        HPI:  Routine follow-up but he did notice his left ear itching on the inside yesterday.  At 1st I thought this is due to allergies but upon direct visualization he actually has an otitis media.  He has had this in the past and I am going to prescribe Augmentin twice daily for 7 days to resolve it.  He does still have left shoulder pain and indeed an MRI did show a partial tear of his rotator cuff, partial tear of his labrum, and a complete tear of his biceps tendon.  He did have an appointment with Ortho but had to postpone it to see his radiation oncologist but ironically that visit got postponed as well.  The good news is that his bone scan was free of cancer I am glad we got that information.  I a.m. going to trying get him to see Ortho sooner rather than later and I will send a message up to them later today.  Thankfully his pain is not too severe and he is learning how to get along with his right arm mostly.    Review of Systems   Constitutional: Negative.    HENT: Negative.    Eyes: Negative.    Respiratory: Negative.    Cardiovascular: Negative.    Gastrointestinal: Negative.    Endocrine: Negative.    Genitourinary: Negative.    Musculoskeletal: Positive for arthralgias.   Skin: Negative.    Allergic/Immunologic: Negative.    Neurological: Negative.    Hematological: Negative.    Psychiatric/Behavioral: Negative.           Objective:      Physical Exam   Physical Exam  Vitals and nursing note reviewed.   Constitutional:       Appearance: Normal appearance. He is well-developed.   HENT:      Head: Normocephalic and atraumatic.      Right Ear: Tympanic membrane, ear canal and external ear normal.      Left Ear: Ear canal and external ear normal.      Ears:      Comments: Left otitis media      Nose: Nose normal.   Eyes:      Extraocular Movements: Extraocular movements intact.       "Conjunctiva/sclera: Conjunctivae normal.      Pupils: Pupils are equal, round, and reactive to light.   Cardiovascular:      Rate and Rhythm: Normal rate and regular rhythm.      Pulses: Normal pulses.      Heart sounds: Normal heart sounds.   Pulmonary:      Effort: Pulmonary effort is normal.      Breath sounds: Normal breath sounds.   Abdominal:      General: Bowel sounds are normal.      Palpations: Abdomen is soft.   Musculoskeletal:      Cervical back: Normal range of motion and neck supple.      Comments: Decreased Range of Motion in Left Upper Extremity    Skin:     General: Skin is warm and dry.      Capillary Refill: Capillary refill takes less than 2 seconds.   Neurological:      General: No focal deficit present.      Mental Status: He is alert and oriented to person, place, and time.   Psychiatric:         Mood and Affect: Mood normal.         Behavior: Behavior normal.         Thought Content: Thought content normal.         Judgment: Judgment normal.            Vitals:   Vitals:    04/14/22 1127   BP: 122/80   Pulse: 72   SpO2: 95%   Weight: 81.2 kg (179 lb)   Height: 5' 10" (1.778 m)          Current Outpatient Medications:     alirocumab (PRALUENT PEN) 75 mg/mL PnIj, Inject 1 mL (75 mg total) into the skin every 14 (fourteen) days., Disp: 2 mL, Rfl: 12    aspirin (ECOTRIN) 81 MG EC tablet, TAKE 1 TABLET BY MOUTH EVERY DAY, Disp: 90 tablet, Rfl: 3    ciprofloxacin HCl (CILOXAN) 0.3 % ophthalmic solution, 5 drops to left EAR twice daily X 10 days, Disp: 10 mL, Rfl: 0    ciprofloxacin-dexamethasone 0.3-0.1% (CIPRODEX) 0.3-0.1 % DrpS, Place 4 drops into the left ear 2 (two) times daily., Disp: 7.5 mL, Rfl: 0    clopidogreL (PLAVIX) 75 mg tablet, TAKE 1 TABLET BY MOUTH EVERY DAY, Disp: 90 tablet, Rfl: 3    cyanocobalamin (VITAMIN B-12) 1000 MCG tablet, Take 1 tablet (1,000 mcg total) by mouth once daily., Disp: 90 tablet, Rfl: 3    FOLBEE 2.5-25-1 mg Tab, TAKE 1 TABLET BY MOUTH EVERY DAY, Disp: 90 " tablet, Rfl: 3    gemfibroziL (LOPID) 600 MG tablet, TAKE 1 TABLET (600 MG TOTAL) BY MOUTH 2 (TWO) TIMES DAILY BEFORE MEALS, Disp: 60 tablet, Rfl: 0    HYDROcodone-acetaminophen (NORCO) 5-325 mg per tablet, Take 1 tablet by mouth every 6 (six) hours as needed for Pain., Disp: 30 tablet, Rfl: 0    ibuprofen (ADVIL,MOTRIN) 200 MG tablet, Take by mouth every 6 (six) hours as needed., Disp: , Rfl:     lisinopriL 10 MG tablet, TAKE 1 TABLET BY MOUTH EVERY DAY, Disp: 90 tablet, Rfl: 0    triamcinolone acetonide 0.1% (KENALOG) 0.1 % cream, Apply topically., Disp: , Rfl:     amoxicillin-clavulanate 875-125mg (AUGMENTIN) 875-125 mg per tablet, Take 1 tablet by mouth every 12 (twelve) hours. for 7 days, Disp: 14 tablet, Rfl: 0    azelastine (ASTELIN) 137 mcg (0.1 %) nasal spray, 1 spray (137 mcg total) by Nasal route 2 (two) times daily., Disp: 30 mL, Rfl: 12    pantoprazole (PROTONIX) 40 MG tablet, Take 40 mg by mouth., Disp: , Rfl:     sildenafiL (VIAGRA) 50 MG tablet, Take 1 tablet (50 mg total) by mouth daily as needed for Erectile Dysfunction. (Patient not taking: No sig reported), Disp: 10 tablet, Rfl: 3    zolpidem (AMBIEN) 5 MG Tab, Take 1 tablet (5 mg total) by mouth nightly as needed., Disp: 30 tablet, Rfl: 3   Assessment:       Patient Active Problem List    Diagnosis Date Noted    Rotator cuff arthropathy of both shoulders 02/02/2022    History of CVA (cerebrovascular accident) 06/02/2021    Insomnia due to medical condition 02/01/2021    Frequent falls 08/14/2020    Cerebrovascular accident (CVA) due to occlusion of left cerebellar artery 05/08/2020    Cervicalgia 05/08/2020    Upper airway cough syndrome 03/27/2020    Degenerative disc disease, cervical 03/27/2020    B12 deficiency 03/03/2020    Folate deficiency 03/03/2020    Xerostomia due to radiotherapy 02/20/2020    Dry skin dermatitis 02/20/2020    Bilateral sensorineural hearing loss 01/23/2017    Tinnitus of both ears 01/23/2017     T1N0M0 SCCA L nasopharynx 04/07/2015    Benign prostatic hyperplasia with urinary obstruction     DJD (degenerative joint disease) of knee 12/10/2012    Neuropathy     Essential hypertension     Mixed hyperlipidemia     ED (erectile dysfunction)           Plan:       Edy Bartlett III  was seen today for follow-up and may need lab work.    Diagnoses and all orders for this visit:    Edy was seen today for otalgia.    Diagnoses and all orders for this visit:    Recurrent acute suppurative otitis media without spontaneous rupture of left tympanic membrane  -     amoxicillin-clavulanate 875-125mg (AUGMENTIN) 875-125 mg per tablet; Take 1 tablet by mouth every 12 (twelve) hours. for 7 days    Tear of biceps tendon

## 2022-04-18 ENCOUNTER — PATIENT MESSAGE (OUTPATIENT)
Dept: ADMINISTRATIVE | Facility: OTHER | Age: 67
End: 2022-04-18
Payer: MEDICARE

## 2022-04-22 ENCOUNTER — SPECIALTY PHARMACY (OUTPATIENT)
Dept: PHARMACY | Facility: CLINIC | Age: 67
End: 2022-04-22
Payer: MEDICARE

## 2022-04-22 NOTE — TELEPHONE ENCOUNTER
Specialty Pharmacy - Refill Coordination    Specialty Medication Orders Linked to Encounter    Flowsheet Row Most Recent Value   Medication #1 alirocumab (PRALUENT PEN) 75 mg/mL PnIj (Order#[id number], Rx#[id number])          Refill Questions - Documented Responses    Flowsheet Row Most Recent Value   Patient Availability and HIPAA Verification    Does patient want to proceed with activity? Yes   HIPAA/medical authority confirmed? Yes   Relationship to patient of person spoken to? Self   Refill Screening Questions    Would patient like to speak to a pharmacist? No   When does the patient need to receive the medication? 05/01/22   Refill Delivery Questions    When does the patient need to receive the medication? 05/01/22   Shipping Address Home   Address in [facility] confirmed and updated if neccessary? Yes   Expected Copay ($) 25   Is the patient able to afford the medication copay? Yes   Payment Method CC on file   Days supply of Refill 28   Supplies needed? No supplies needed   Refill activity completed? Yes   Refill activity plan Refill scheduled   Shipment/Pickup Date: 04/28/22          Current Outpatient Medications   Medication Sig    alirocumab (PRALUENT PEN) 75 mg/mL PnIj Inject 1 mL (75 mg total) into the skin every 14 (fourteen) days.    aspirin (ECOTRIN) 81 MG EC tablet TAKE 1 TABLET BY MOUTH EVERY DAY    azelastine (ASTELIN) 137 mcg (0.1 %) nasal spray 1 spray (137 mcg total) by Nasal route 2 (two) times daily.    ciprofloxacin HCl (CILOXAN) 0.3 % ophthalmic solution 5 drops to left EAR twice daily X 10 days    ciprofloxacin-dexamethasone 0.3-0.1% (CIPRODEX) 0.3-0.1 % DrpS Place 4 drops into the left ear 2 (two) times daily.    clopidogreL (PLAVIX) 75 mg tablet TAKE 1 TABLET BY MOUTH EVERY DAY    cyanocobalamin (VITAMIN B-12) 1000 MCG tablet Take 1 tablet (1,000 mcg total) by mouth once daily.    FOLBEE 2.5-25-1 mg Tab TAKE 1 TABLET BY MOUTH EVERY DAY    gemfibroziL (LOPID) 600 MG tablet  TAKE 1 TABLET (600 MG TOTAL) BY MOUTH 2 (TWO) TIMES DAILY BEFORE MEALS    HYDROcodone-acetaminophen (NORCO) 5-325 mg per tablet Take 1 tablet by mouth every 6 (six) hours as needed for Pain.    ibuprofen (ADVIL,MOTRIN) 200 MG tablet Take by mouth every 6 (six) hours as needed.    lisinopriL 10 MG tablet TAKE 1 TABLET BY MOUTH EVERY DAY    pantoprazole (PROTONIX) 40 MG tablet Take 40 mg by mouth.    sildenafiL (VIAGRA) 50 MG tablet Take 1 tablet (50 mg total) by mouth daily as needed for Erectile Dysfunction. (Patient not taking: No sig reported)    triamcinolone acetonide 0.1% (KENALOG) 0.1 % cream Apply topically.    zolpidem (AMBIEN) 5 MG Tab Take 1 tablet (5 mg total) by mouth nightly as needed.   Last reviewed on 4/14/2022 11:59 AM by Huy Cordova MD    Review of patient's allergies indicates:   Allergen Reactions    Statins-hmg-coa reductase inhibitors Other (See Comments)     Not allergy, makes him feel awful    Last reviewed on  4/14/2022 11:59 AM by Huy Cordova      Tasks added this encounter   5/22/2022 - Refill Call (Auto Added)   Tasks due within next 3 months   No tasks due.     Leann Oden [facility]  [address] [address]  Phone: [phone]  Fax: [phone]

## 2022-04-25 ENCOUNTER — TELEPHONE (OUTPATIENT)
Dept: ORTHOPEDICS | Facility: CLINIC | Age: 67
End: 2022-04-25
Payer: MEDICARE

## 2022-04-25 NOTE — TELEPHONE ENCOUNTER
----- Message from Yadiel Acuña MA sent at 4/25/2022 11:26 AM CDT -----  Type: Patient Call Back    Who called:self    What is the request in detail:pt. Is asking for staff to call him in regards to a text he received for an earlier appt.. upon checking no earlier appt. Was visible..     Can the clinic reply by [facility]?no    Would the patient rather a call back or a response via [facility]? yes    Best call back number:[phone] (home)

## 2022-04-25 NOTE — TELEPHONE ENCOUNTER
Contacted patient. Informed him on how the wait list policy works. No sooner appointment was available today since he didn't login into [employer] and request the appointment. Patient verbalized understanding and doesn't want his appointment/s in the future to be added to the wait list. 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

## 2022-04-29 ENCOUNTER — OFFICE VISIT (OUTPATIENT)
Dept: ORTHOPEDICS | Facility: CLINIC | Age: 67
End: 2022-04-29
Payer: MEDICARE

## 2022-04-29 VITALS — BODY MASS INDEX: 25.62 KG/M2 | WEIGHT: 179 LBS | HEIGHT: 70 IN

## 2022-04-29 DIAGNOSIS — M25.512 LEFT SHOULDER PAIN: Primary | ICD-10-CM

## 2022-04-29 PROCEDURE — 99999 PR PBB SHADOW E&M-EST. PATIENT-LVL III: CPT | Mod: PBBFAC,,, | Performed by: ORTHOPAEDIC SURGERY

## 2022-04-29 PROCEDURE — 99213 OFFICE O/P EST LOW 20 MIN: CPT | Mod: PBBFAC,PN,25 | Performed by: ORTHOPAEDIC SURGERY

## 2022-04-29 PROCEDURE — 99214 PR OFFICE/OUTPT VISIT, EST, LEVL IV, 30-39 MIN: ICD-10-PCS | Mod: 25,S$PBB,, | Performed by: ORTHOPAEDIC SURGERY

## 2022-04-29 PROCEDURE — 20610 DRAIN/INJ JOINT/BURSA W/O US: CPT | Mod: PBBFAC,PN | Performed by: ORTHOPAEDIC SURGERY

## 2022-04-29 PROCEDURE — 99214 OFFICE O/P EST MOD 30 MIN: CPT | Mod: 25,S$PBB,, | Performed by: ORTHOPAEDIC SURGERY

## 2022-04-29 PROCEDURE — 99999 PR PBB SHADOW E&M-EST. PATIENT-LVL III: ICD-10-PCS | Mod: PBBFAC,,, | Performed by: ORTHOPAEDIC SURGERY

## 2022-04-29 PROCEDURE — 20610 LARGE JOINT ASPIRATION/INJECTION: L SUBACROMIAL BURSA: ICD-10-PCS | Mod: S$PBB,LT,, | Performed by: ORTHOPAEDIC SURGERY

## 2022-04-29 RX ORDER — TRIAMCINOLONE ACETONIDE 40 MG/ML
80 INJECTION, SUSPENSION INTRA-ARTICULAR; INTRAMUSCULAR
Status: DISCONTINUED | OUTPATIENT
Start: 2022-04-29 | End: 2022-04-29 | Stop reason: HOSPADM

## 2022-04-29 RX ADMIN — TRIAMCINOLONE ACETONIDE 80 MG: 40 INJECTION, SUSPENSION INTRA-ARTICULAR; INTRAMUSCULAR at 09:04

## 2022-04-29 NOTE — PROCEDURES
Large Joint Aspiration/Injection: L subacromial bursa    Date/Time: 4/29/2022 9:30 AM  Performed by: Nelson Silva MD  Authorized by: Nelson Silva MD     Consent Done?:  Yes (Verbal)  Site marked: the procedure site was marked    Prep: patient was prepped and draped in usual sterile fashion      Details:  Needle Size:  21 G  Approach:  Posterior  Location:  Shoulder  Site:  L subacromial bursa  Medications:  80 mg triamcinolone acetonide 40 mg/mL  Patient tolerance:  Patient tolerated the procedure well with no immediate complications
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
## 2022-05-09 ENCOUNTER — PATIENT MESSAGE (OUTPATIENT)
Dept: SMOKING CESSATION | Facility: CLINIC | Age: 67
End: 2022-05-09
Payer: MEDICARE

## 2022-05-23 ENCOUNTER — SPECIALTY PHARMACY (OUTPATIENT)
Dept: PHARMACY | Facility: CLINIC | Age: 67
End: 2022-05-23
Payer: MEDICARE

## 2022-05-23 NOTE — TELEPHONE ENCOUNTER
Specialty Pharmacy - Refill Coordination    Specialty Medication Orders Linked to Encounter    Flowsheet Row Most Recent Value   Medication #1 alirocumab (PRALUENT PEN) 75 mg/mL PnIj (Order#[id number], Rx#[id number])          Refill Questions - Documented Responses    Flowsheet Row Most Recent Value   Patient Availability and HIPAA Verification    Does patient want to proceed with activity? Yes   HIPAA/medical authority confirmed? Yes   Relationship to patient of person spoken to? Self   Refill Screening Questions    Would patient like to speak to a pharmacist? No   When does the patient need to receive the medication? 06/01/22   Refill Delivery Questions    How will the patient receive the medication? Delivery Marsha   When does the patient need to receive the medication? 06/01/22   Shipping Address Home   Address in [facility] confirmed and updated if neccessary? Yes   Expected Copay ($) 25   Is the patient able to afford the medication copay? Yes   Payment Method CC on file   Days supply of Refill 28   Refill activity completed? Yes   Refill activity plan Refill scheduled   Shipment/Pickup Date: 05/30/22          Current Outpatient Medications   Medication Sig    alirocumab (PRALUENT PEN) 75 mg/mL PnIj Inject 1 mL (75 mg total) into the skin every 14 (fourteen) days.    aspirin (ECOTRIN) 81 MG EC tablet TAKE 1 TABLET BY MOUTH EVERY DAY    azelastine (ASTELIN) 137 mcg (0.1 %) nasal spray 1 spray (137 mcg total) by Nasal route 2 (two) times daily.    ciprofloxacin HCl (CILOXAN) 0.3 % ophthalmic solution 5 drops to left EAR twice daily X 10 days    ciprofloxacin-dexamethasone 0.3-0.1% (CIPRODEX) 0.3-0.1 % DrpS Place 4 drops into the left ear 2 (two) times daily.    clopidogreL (PLAVIX) 75 mg tablet TAKE 1 TABLET BY MOUTH EVERY DAY    cyanocobalamin (VITAMIN B-12) 1000 MCG tablet Take 1 tablet (1,000 mcg total) by mouth once daily.    FOLBEE 2.5-25-1 mg Tab TAKE 1 TABLET BY MOUTH EVERY DAY     gemfibroziL (LOPID) 600 MG tablet TAKE 1 TABLET (600 MG TOTAL) BY MOUTH 2 (TWO) TIMES DAILY BEFORE MEALS    HYDROcodone-acetaminophen (NORCO) 5-325 mg per tablet Take 1 tablet by mouth every 6 (six) hours as needed for Pain.    ibuprofen (ADVIL,MOTRIN) 200 MG tablet Take by mouth every 6 (six) hours as needed.    lisinopriL 10 MG tablet TAKE 1 TABLET BY MOUTH EVERY DAY    pantoprazole (PROTONIX) 40 MG tablet Take 40 mg by mouth.    sildenafiL (VIAGRA) 50 MG tablet Take 1 tablet (50 mg total) by mouth daily as needed for Erectile Dysfunction. (Patient not taking: No sig reported)    triamcinolone acetonide 0.1% (KENALOG) 0.1 % cream Apply topically.    zolpidem (AMBIEN) 5 MG Tab Take 1 tablet (5 mg total) by mouth nightly as needed.   Last reviewed on 4/29/2022  9:55 AM by Nelson Silva MD    Review of patient's allergies indicates:   Allergen Reactions    Statins-hmg-coa reductase inhibitors Other (See Comments)     Not allergy, makes him feel awful    Last reviewed on  4/29/2022 9:55 AM by eNlson Silva      Tasks added this encounter   6/22/2022 - Refill Call (Auto Added)   Tasks due within next 3 months   No tasks due.     Chava Oden [facility] - [facility]  [address] [address]  Phone: [phone]  Fax: [phone]

## 2022-06-07 ENCOUNTER — PATIENT OUTREACH (OUTPATIENT)
Dept: ADMINISTRATIVE | Facility: HOSPITAL | Age: 67
End: 2022-06-07
Payer: MEDICARE

## 2022-06-07 NOTE — PROGRESS NOTES
Pre-visit Health Maintenance Review 2022  Care Everywhere updates requested and reviewed.  Immunizations reconciled. Media reports reviewed.  Duplicate HM overrides and  orders removed.  Overdue HM topic chart audit and/or requested.  Overdue lab testing linked to upcoming lab appointments if applies.    Health Maintenance Due   Topic Date Due    COVID-19 Vaccine (1) Never done    Shingles Vaccine (1 of 2) Never done    PROSTATE-SPECIFIC ANTIGEN  10/12/2010    Pneumococcal Vaccines (Age 65+) (2 - PCV) 2021    Lipid Panel  2022

## 2022-06-16 ENCOUNTER — PATIENT MESSAGE (OUTPATIENT)
Dept: ADMINISTRATIVE | Facility: HOSPITAL | Age: 67
End: 2022-06-16
Payer: MEDICARE

## 2022-06-16 ENCOUNTER — PATIENT OUTREACH (OUTPATIENT)
Dept: ADMINISTRATIVE | Facility: HOSPITAL | Age: 67
End: 2022-06-16
Payer: MEDICARE

## 2022-06-16 NOTE — LETTER
June 24, 2022    Edy Bartlett III  [address]  [place] [address]             [facility]  [address]  [place] [facility] [place] [address]  Phone: [phone] Dear Mr. Bartlett:    We have tried to reach you by mychart unsuccessfully.    [facility] is committed to your overall health.  To help you get the most out of each of your visits, we will review your information to make sure you are up to date on all of your recommended tests and or procedures.       Your [facility] team has found that you may be due for:     Health Maintenance Due   Topic    COVID-19 Vaccine (1)    Shingles Vaccine (1 of 2)    Colorectal Cancer Screening     PROSTATE-SPECIFIC ANTIGEN     Pneumococcal Vaccines (Age 65+) (2 - PCV)    Lipid Panel          If you have had any of the above done at another facility, please bring the records or information with you so that your record at [facility] will be complete and up to date.     If you have not had any of these tests or procedures done recently and would like to complete this testing before your appointment with Huy Cordova MD please call [phone] or send a message through your [facility] portal to your provider's office.      If you are currently taking medication, please bring it with you to your appointment for review.     Also, if you have any type of Advanced Directives, please bring them with you to your office visit so we may scan them into your chart.          Thanks,      Huy Cordova MD and your [facility] Team

## 2022-06-27 ENCOUNTER — SPECIALTY PHARMACY (OUTPATIENT)
Dept: PHARMACY | Facility: CLINIC | Age: 67
End: 2022-06-27
Payer: MEDICARE

## 2022-06-27 NOTE — TELEPHONE ENCOUNTER
Patient states MD is having him skip this month. Will follow up at next refill call to schedule shipment.

## 2022-07-09 DIAGNOSIS — I10 ESSENTIAL HYPERTENSION: ICD-10-CM

## 2022-07-09 NOTE — TELEPHONE ENCOUNTER
Care Due:                  Date            Visit Type   Department     Provider  --------------------------------------------------------------------------------                                EP -                              PRIMARY      [facility] FAMILY  Last Visit: 04-      CARE ([facility])   MEDICINE       Huy Cordova  Next Visit: None Scheduled  None         None Found                                                            Last  Test          Frequency    Reason                     Performed    Due Date  --------------------------------------------------------------------------------    CBC.........  12 months..  gemfibroziL..............  06- 05-    CMP.........  12 months..  gemfibroziL, lisinopriL..  06- 05-    Lipid Panel.  12 months..  gemfibroziL..............  06- 05-    Health [facility] Embedded Care Gaps. Reference number: [id number]. 7/09/2022   10:51:14 AM CDT

## 2022-07-11 NOTE — TELEPHONE ENCOUNTER
Refill Routing Note   Medication(s) are not appropriate for processing by [facility] for the following reason(s):      - Required laboratory values are outdated    [facility] action(s):  Defer Medication-related problems identified: Requires labs        Medication reconciliation completed: No     Appointments  past 12m or future 3m with PCP    Date Provider   Last Visit   4/14/2022 Huy Cordova MD   Next Visit   Visit date not found Huy Cordova MD   ED visits in past 90 days: 0        Note composed:10:32 AM 07/11/2022

## 2022-07-12 RX ORDER — LISINOPRIL 10 MG/1
TABLET ORAL
Qty: 90 TABLET | Refills: 3 | Status: SHIPPED | OUTPATIENT
Start: 2022-07-12 | End: 2023-07-20

## 2022-07-27 ENCOUNTER — TELEPHONE (OUTPATIENT)
Dept: FAMILY MEDICINE | Facility: CLINIC | Age: 67
End: 2022-07-27
Payer: MEDICARE

## 2022-07-27 ENCOUNTER — SPECIALTY PHARMACY (OUTPATIENT)
Dept: PHARMACY | Facility: CLINIC | Age: 67
End: 2022-07-27
Payer: MEDICARE

## 2022-07-27 NOTE — TELEPHONE ENCOUNTER
Specialty Pharmacy - Refill Coordination    Specialty Medication Orders Linked to Encounter    Flowsheet Row Most Recent Value   Medication #1 alirocumab (PRALUENT PEN) 75 mg/mL PnIj (Order#[id number], Rx#[id number])          Refill Questions - Documented Responses    Flowsheet Row Most Recent Value   Patient Availability and HIPAA Verification    Does patient want to proceed with activity? Yes   HIPAA/medical authority confirmed? Yes   Relationship to patient of person spoken to? Self   Refill Screening Questions    Would patient like to speak to a pharmacist? No   When does the patient need to receive the medication? 08/01/22   Refill Delivery Questions    How will the patient receive the medication? Delivery Marsha   When does the patient need to receive the medication? 08/01/22   Shipping Address Home   Address in [facility] confirmed and updated if neccessary? Yes   Expected Copay ($) 25   Is the patient able to afford the medication copay? Yes   Payment Method CC on file   Days supply of Refill 28   Supplies needed? No supplies needed   Refill activity completed? Yes   Refill activity plan Refill scheduled   Shipment/Pickup Date: 07/28/22          Current Outpatient Medications   Medication Sig    alirocumab (PRALUENT PEN) 75 mg/mL PnIj Inject 1 mL (75 mg total) into the skin every 14 (fourteen) days.    aspirin (ECOTRIN) 81 MG EC tablet TAKE 1 TABLET BY MOUTH EVERY DAY    azelastine (ASTELIN) 137 mcg (0.1 %) nasal spray 1 spray (137 mcg total) by Nasal route 2 (two) times daily.    ciprofloxacin HCl (CILOXAN) 0.3 % ophthalmic solution 5 drops to left EAR twice daily X 10 days    ciprofloxacin-dexamethasone 0.3-0.1% (CIPRODEX) 0.3-0.1 % DrpS Place 4 drops into the left ear 2 (two) times daily.    clopidogreL (PLAVIX) 75 mg tablet TAKE 1 TABLET BY MOUTH EVERY DAY    cyanocobalamin (VITAMIN B-12) 1000 MCG tablet Take 1 tablet (1,000 mcg total) by mouth once daily.    FOLBEE 2.5-25-1 mg Tab TAKE 1  TABLET BY MOUTH EVERY DAY    gemfibroziL (LOPID) 600 MG tablet TAKE 1 TABLET (600 MG TOTAL) BY MOUTH 2 (TWO) TIMES DAILY BEFORE MEALS    HYDROcodone-acetaminophen (NORCO) 5-325 mg per tablet Take 1 tablet by mouth every 6 (six) hours as needed for Pain.    ibuprofen (ADVIL,MOTRIN) 200 MG tablet Take by mouth every 6 (six) hours as needed.    lisinopriL 10 MG tablet TAKE 1 TABLET BY MOUTH EVERY DAY    pantoprazole (PROTONIX) 40 MG tablet Take 40 mg by mouth.    sildenafiL (VIAGRA) 50 MG tablet Take 1 tablet (50 mg total) by mouth daily as needed for Erectile Dysfunction. (Patient not taking: No sig reported)    triamcinolone acetonide 0.1% (KENALOG) 0.1 % cream Apply topically.    zolpidem (AMBIEN) 5 MG Tab Take 1 tablet (5 mg total) by mouth nightly as needed.   Last reviewed on 4/29/2022  9:55 AM by Nelson Silva MD    Review of patient's allergies indicates:   Allergen Reactions    Statins-hmg-coa reductase inhibitors Other (See Comments)     Not allergy, makes him feel awful    Last reviewed on  4/29/2022 9:55 AM by Nelson Silva      Tasks added this encounter   8/22/2022 - Refill Call (Auto Added)   Tasks due within next 3 months   No tasks due.     Kerri Oden [facility]  [address] [facility] [address]  Phone: [phone]  Fax: [phone]

## 2022-07-27 NOTE — TELEPHONE ENCOUNTER
----- Message from Laisha Rodriguez sent at 7/26/2022  9:19 AM CDT -----  Regarding: appointment  Type:  Sooner Appointment Request    Caller is requesting a sooner appointment.  Caller declined first available appointment listed below.  Caller will not accept being placed on the waitlist and is requesting a message be sent to doctor.    Name of Caller:  patient  When is the first available appointment?    Symptoms:  [facility] on 07/19/22 dx cut left arm needs suture removal  Best Call Back Number:  [phone] (home)   Additional Information:  Patient would also like to check the tendon. Arm is turning black and blue. Patient needed the sutures out a week to 10 days from injury. Please call patient to advise.Thanks!

## 2022-07-29 ENCOUNTER — OFFICE VISIT (OUTPATIENT)
Dept: FAMILY MEDICINE | Facility: CLINIC | Age: 67
End: 2022-07-29
Payer: MEDICARE

## 2022-07-29 VITALS
OXYGEN SATURATION: 98 % | DIASTOLIC BLOOD PRESSURE: 78 MMHG | HEIGHT: 68 IN | RESPIRATION RATE: 18 BRPM | BODY MASS INDEX: 27.06 KG/M2 | WEIGHT: 178.56 LBS | SYSTOLIC BLOOD PRESSURE: 122 MMHG | HEART RATE: 72 BPM

## 2022-07-29 DIAGNOSIS — R58 ECCHYMOSIS OF FOREARM: ICD-10-CM

## 2022-07-29 DIAGNOSIS — S41.112D LACERATION OF LEFT UPPER EXTREMITY WITH COMPLICATION, SUBSEQUENT ENCOUNTER: Primary | ICD-10-CM

## 2022-07-29 PROCEDURE — 99999 PR PBB SHADOW E&M-EST. PATIENT-LVL V: ICD-10-PCS | Mod: PBBFAC,,, | Performed by: FAMILY MEDICINE

## 2022-07-29 PROCEDURE — 99999 PR PBB SHADOW E&M-EST. PATIENT-LVL V: CPT | Mod: PBBFAC,,, | Performed by: FAMILY MEDICINE

## 2022-07-29 PROCEDURE — 99213 OFFICE O/P EST LOW 20 MIN: CPT | Mod: S$PBB,,, | Performed by: FAMILY MEDICINE

## 2022-07-29 PROCEDURE — 99213 PR OFFICE/OUTPT VISIT, EST, LEVL III, 20-29 MIN: ICD-10-PCS | Mod: S$PBB,,, | Performed by: FAMILY MEDICINE

## 2022-07-29 PROCEDURE — 99215 OFFICE O/P EST HI 40 MIN: CPT | Mod: PBBFAC,PO | Performed by: FAMILY MEDICINE

## 2022-07-29 NOTE — Clinical Note
He was in today for an injury.  He requested a refill on his Hydrocodone.  I told him I would let you know he was in and asked.

## 2022-07-29 NOTE — PROGRESS NOTES
"Subjective:       Patient ID: Edy Bartlett III is a 66 y.o. male.    Chief Complaint: Arm Injury     Here today for an acute visit.  He is a patient of Dr. Cordova, new to me today.  He is here today to f/u on an injury that occurred at work.  He lacerated his forearm on a piece of broken glass.  He went to [facility] and had his laceration repaired at [place].  He had stiches removed 2 days ago at .  A few days after the injury, he developed a large bruise to his forearm inferior to the laceration.  He has a history of multiple tendon ruputres and is concerned about his forearm.  He has seen Dr. Silva here, last seen in April.     Review of Systems   Constitutional: Negative for appetite change, fatigue and fever.   Respiratory: Negative for cough, shortness of breath and wheezing.    Cardiovascular: Negative for chest pain and palpitations.   Gastrointestinal: Negative for abdominal pain, constipation, diarrhea, nausea and vomiting.   Genitourinary: Negative for difficulty urinating, dysuria, frequency and hematuria.   Neurological: Negative for dizziness, syncope, weakness and headaches.       Objective:      Vitals:    07/29/22 1329   BP: 122/78   BP Location: Left arm   Patient Position: Sitting   Pulse: 72   Resp: 18   SpO2: 98%   Weight: 81 kg (178 lb 9.2 oz)   Height: 5' 8" (1.727 m)      Physical Exam  Cardiovascular:      Rate and Rhythm: Normal rate and regular rhythm.      Pulses: Normal pulses.   Pulmonary:      Effort: Pulmonary effort is normal. No respiratory distress.      Breath sounds: Normal breath sounds. No wheezing.   Musculoskeletal:      Right elbow: Normal range of motion.      Left elbow: Normal range of motion.      Left forearm: Laceration (well healed) and tenderness present. No swelling (large eccymosis to forearm).      Right hand: Normal range of motion.      Left hand: Normal range of motion. Normal strength. Normal sensation.   Neurological:      Mental Status: He is alert. "                    Assessment:       1. Laceration of left upper extremity with complication, subsequent encounter    2. Ecchymosis of forearm        Plan:       Laceration of left upper extremity with complication, subsequent encounter  -     Ambulatory referral/consult to Orthopedics; Future; Expected date: 08/05/2022    Ecchymosis of forearm  -     Ambulatory referral/consult to Orthopedics; Future; Expected date: 08/05/2022    Laceration is healing well.  Will refer to Ortho Hand as he is concerned about another possible tendon tear.        Medication List with Changes/Refills   Current Medications    ALIROCUMAB (PRALUENT PEN) 75 MG/ML PNIJ    Inject 1 mL (75 mg total) into the skin every 14 (fourteen) days.    ASPIRIN (ECOTRIN) 81 MG EC TABLET    TAKE 1 TABLET BY MOUTH EVERY DAY    AZELASTINE (ASTELIN) 137 MCG (0.1 %) NASAL SPRAY    1 spray (137 mcg total) by Nasal route 2 (two) times daily.    CIPROFLOXACIN HCL (CILOXAN) 0.3 % OPHTHALMIC SOLUTION    5 drops to left EAR twice daily X 10 days    CIPROFLOXACIN-DEXAMETHASONE 0.3-0.1% (CIPRODEX) 0.3-0.1 % DRPS    Place 4 drops into the left ear 2 (two) times daily.    CLOPIDOGREL (PLAVIX) 75 MG TABLET    TAKE 1 TABLET BY MOUTH EVERY DAY    CYANOCOBALAMIN (VITAMIN B-12) 1000 MCG TABLET    Take 1 tablet (1,000 mcg total) by mouth once daily.    FOLBEE 2.5-25-1 MG TAB    TAKE 1 TABLET BY MOUTH EVERY DAY    GEMFIBROZIL (LOPID) 600 MG TABLET    TAKE 1 TABLET (600 MG TOTAL) BY MOUTH 2 (TWO) TIMES DAILY BEFORE MEALS    HYDROCODONE-ACETAMINOPHEN (NORCO) 5-325 MG PER TABLET    Take 1 tablet by mouth every 6 (six) hours as needed for Pain.    IBUPROFEN (ADVIL,MOTRIN) 200 MG TABLET    Take by mouth every 6 (six) hours as needed.    LISINOPRIL 10 MG TABLET    TAKE 1 TABLET BY MOUTH EVERY DAY    PANTOPRAZOLE (PROTONIX) 40 MG TABLET    Take 40 mg by mouth.    SILDENAFIL (VIAGRA) 50 MG TABLET    Take 1 tablet (50 mg total) by mouth daily as needed for Erectile Dysfunction.     TRIAMCINOLONE ACETONIDE 0.1% (KENALOG) 0.1 % CREAM    Apply topically.    ZOLPIDEM (AMBIEN) 5 MG TAB    Take 1 tablet (5 mg total) by mouth nightly as needed.

## 2022-08-18 ENCOUNTER — OFFICE VISIT (OUTPATIENT)
Dept: OTOLARYNGOLOGY | Facility: CLINIC | Age: 67
End: 2022-08-18
Payer: MEDICARE

## 2022-08-18 VITALS — HEIGHT: 68 IN | BODY MASS INDEX: 27.43 KG/M2 | WEIGHT: 181 LBS

## 2022-08-18 DIAGNOSIS — J31.0 GUSTATORY RHINITIS: Primary | ICD-10-CM

## 2022-08-18 DIAGNOSIS — H61.23 BILATERAL IMPACTED CERUMEN: ICD-10-CM

## 2022-08-18 DIAGNOSIS — H93.13 TINNITUS, BILATERAL: ICD-10-CM

## 2022-08-18 DIAGNOSIS — H74.02 TS (TYMPANOSCLEROSIS), LEFT: ICD-10-CM

## 2022-08-18 PROCEDURE — 99999 PR PBB SHADOW E&M-EST. PATIENT-LVL III: CPT | Mod: PBBFAC,,, | Performed by: NURSE PRACTITIONER

## 2022-08-18 PROCEDURE — 99213 OFFICE O/P EST LOW 20 MIN: CPT | Mod: PBBFAC,PO | Performed by: NURSE PRACTITIONER

## 2022-08-18 PROCEDURE — 69210 REMOVE IMPACTED EAR WAX UNI: CPT | Mod: 50,PBBFAC,PO | Performed by: NURSE PRACTITIONER

## 2022-08-18 PROCEDURE — 69210 REMOVE IMPACTED EAR WAX UNI: CPT | Mod: S$PBB,,, | Performed by: NURSE PRACTITIONER

## 2022-08-18 PROCEDURE — 99999 PR PBB SHADOW E&M-EST. PATIENT-LVL III: ICD-10-PCS | Mod: PBBFAC,,, | Performed by: NURSE PRACTITIONER

## 2022-08-18 PROCEDURE — 99214 OFFICE O/P EST MOD 30 MIN: CPT | Mod: 25,S$PBB,, | Performed by: NURSE PRACTITIONER

## 2022-08-18 PROCEDURE — 99214 PR OFFICE/OUTPT VISIT, EST, LEVL IV, 30-39 MIN: ICD-10-PCS | Mod: 25,S$PBB,, | Performed by: NURSE PRACTITIONER

## 2022-08-18 PROCEDURE — 69210 PR REMOVAL IMPACTED CERUMEN REQUIRING INSTRUMENTATION, UNILATERAL: ICD-10-PCS | Mod: S$PBB,,, | Performed by: NURSE PRACTITIONER

## 2022-08-18 RX ORDER — IPRATROPIUM BROMIDE 42 UG/1
2 SPRAY, METERED NASAL 3 TIMES DAILY
Qty: 15 ML | Refills: 12 | Status: SHIPPED | OUTPATIENT
Start: 2022-08-18 | End: 2023-08-18

## 2022-08-18 NOTE — PROGRESS NOTES
Subjective:       Patient ID: Edy Bartlett III is a 67 y.o. male.    Chief Complaint: No chief complaint on file.    HPI   Patient last seen 9 months ago for left otorrhea via PET; treated with Ciprofloxacin otic. Culture grew normal skin silvia despite its mucopurulent appearance. He wears hearing aids AU.   Patient returns today for ear cleaning. Patient denies otalgia or otorrhea. Patient states that every time he eats, his nose runs profuse clear discharge.     Review of Systems   Constitutional: Negative.    HENT: Positive for ear discharge, hearing loss, rhinorrhea and tinnitus.    Eyes: Negative.    Respiratory: Negative.    Cardiovascular: Negative.    Gastrointestinal: Negative.    Musculoskeletal: Negative.    Integumentary:  Negative.   Neurological: Negative.    Hematological: Negative.    Psychiatric/Behavioral: Negative.          Objective:      Physical Exam  Vitals and nursing note reviewed.   Constitutional:       General: He is not in acute distress.     Appearance: He is well-developed. He is not ill-appearing or diaphoretic.   HENT:      Head: Normocephalic and atraumatic.      Right Ear: Tympanic membrane, ear canal and external ear normal. Decreased hearing noted. No middle ear effusion. Tympanic membrane is not erythematous.      Left Ear: Ear canal and external ear normal. Decreased hearing noted. Tympanic membrane is scarred. Tympanic membrane is not erythematous.      Nose: Nose normal.      Mouth/Throat:      Mouth: No oral lesions.      Dentition: Abnormal dentition. Dental caries present.      Tongue: No lesions.      Palate: No lesions.      Pharynx: Uvula midline. No posterior oropharyngeal erythema.      Tonsils: No tonsillar exudate.   Eyes:      General: Lids are normal. No scleral icterus.        Right eye: No discharge.         Left eye: No discharge.   Neck:      Trachea: Trachea normal. No tracheal deviation.   Cardiovascular:      Rate and Rhythm: Normal rate.   Pulmonary:       Effort: Pulmonary effort is normal. No respiratory distress.      Breath sounds: No stridor. No wheezing.   Musculoskeletal:         General: Normal range of motion.      Cervical back: Normal range of motion and neck supple.   Skin:     General: Skin is warm and dry.      Coloration: Skin is not pale.   Neurological:      Mental Status: He is alert and oriented to person, place, and time.      Coordination: Coordination normal.      Gait: Gait normal.   Psychiatric:         Speech: Speech normal.         Behavior: Behavior normal. Behavior is cooperative.         Thought Content: Thought content normal.         Judgment: Judgment normal.      SEPARATE PROCEDURE IN OFFICE:   Procedure: Removal of impacted cerumen, Bilateral   Pre Procedure Diagnosis: Cerumen Impaction   Post Procedure Diagnosis: Cerumen Impaction   Verbal informed consent in regards to risk of trauma to ear canal, ear drum or hearing, discomfort during procedure and/or inability to remove cerumen impaction in one session or unforeseen events or complications.   No anesthesia.     Procedure in detail:   Ear canal visualized bilateral with appropriate size ear speculum utilizing Operating Head Binocular Otomicroscope   Utilizing the following:  Ring curet, delicate alligator forceps, and/or suction cannula was used. The impacted cerumen of the ear canals was removed atraumatically. The TM and EAC were then inspected and found to be clear of wax. See description of TMs/EACs in PE above.   Complications: No   Condition: Improved/Good     Assessment:     Bilateral cerumen impactions removed    Left TM sclerosed/opaque  Gustatory rhinitis  Tinnitus AU (AS>AD)  H/o bilateral SNHL   Plan:     Discontinue Astelin. Start Atrovent nasal spray    Patient encouraged to return to clinic if symptoms worsen/persist and as needed for further ENT symptoms or concerns.

## 2022-08-25 ENCOUNTER — SPECIALTY PHARMACY (OUTPATIENT)
Dept: PHARMACY | Facility: CLINIC | Age: 67
End: 2022-08-25
Payer: MEDICARE

## 2022-08-25 NOTE — TELEPHONE ENCOUNTER
Specialty Pharmacy - Refill Coordination    Specialty Medication Orders Linked to Encounter    Flowsheet Row Most Recent Value   Medication #1 alirocumab (PRALUENT PEN) 75 mg/mL PnIj (Order#[id number], Rx#[id number])          Refill Questions - Documented Responses    Flowsheet Row Most Recent Value   Patient Availability and HIPAA Verification    Does patient want to proceed with activity? Yes   HIPAA/medical authority confirmed? Yes   Relationship to patient of person spoken to? Self   Refill Screening Questions    Would patient like to speak to a pharmacist? No   When does the patient need to receive the medication? 09/01/22   Refill Delivery Questions    How will the patient receive the medication? Delivery Marsha   When does the patient need to receive the medication? 09/01/22   Shipping Address Home   Address in [facility] confirmed and updated if neccessary? Yes   Expected Copay ($) 25   Is the patient able to afford the medication copay? Yes   Payment Method CC on file   Days supply of Refill 28   Supplies needed? No supplies needed   Refill activity completed? Yes   Refill activity plan Refill scheduled   Shipment/Pickup Date: 08/29/22          Current Outpatient Medications   Medication Sig    alirocumab (PRALUENT PEN) 75 mg/mL PnIj Inject 1 mL (75 mg total) into the skin every 14 (fourteen) days.    aspirin (ECOTRIN) 81 MG EC tablet TAKE 1 TABLET BY MOUTH EVERY DAY    ciprofloxacin HCl (CILOXAN) 0.3 % ophthalmic solution 5 drops to left EAR twice daily X 10 days (Patient not taking: Reported on 7/29/2022)    ciprofloxacin-dexamethasone 0.3-0.1% (CIPRODEX) 0.3-0.1 % DrpS Place 4 drops into the left ear 2 (two) times daily. (Patient not taking: Reported on 7/29/2022)    clopidogreL (PLAVIX) 75 mg tablet TAKE 1 TABLET BY MOUTH EVERY DAY    cyanocobalamin (VITAMIN B-12) 1000 MCG tablet Take 1 tablet (1,000 mcg total) by mouth once daily.    FOLBEE 2.5-25-1 mg Tab TAKE 1 TABLET BY MOUTH EVERY DAY     gemfibroziL (LOPID) 600 MG tablet TAKE 1 TABLET (600 MG TOTAL) BY MOUTH 2 (TWO) TIMES DAILY BEFORE MEALS    HYDROcodone-acetaminophen (NORCO) 5-325 mg per tablet Take 1 tablet by mouth every 6 (six) hours as needed for Pain.    ibuprofen (ADVIL,MOTRIN) 200 MG tablet Take by mouth every 6 (six) hours as needed.    ipratropium (ATROVENT) 42 mcg (0.06 %) nasal spray 2 sprays by Nasal route 3 (three) times daily. Use 2-3 times per day if necessary for chronic nasal drip    lisinopriL 10 MG tablet TAKE 1 TABLET BY MOUTH EVERY DAY    pantoprazole (PROTONIX) 40 MG tablet Take 40 mg by mouth.    sildenafiL (VIAGRA) 50 MG tablet Take 1 tablet (50 mg total) by mouth daily as needed for Erectile Dysfunction. (Patient not taking: No sig reported)    triamcinolone acetonide 0.1% (KENALOG) 0.1 % cream Apply topically.    zolpidem (AMBIEN) 5 MG Tab Take 1 tablet (5 mg total) by mouth nightly as needed.   Last reviewed on 8/18/2022  1:55 PM by Marimar Jimenez NP    Review of patient's allergies indicates:   Allergen Reactions    Statins-hmg-coa reductase inhibitors Other (See Comments)     Not allergy, makes him feel awful    Last reviewed on  8/18/2022 1:11 PM by Dunia Bowie      Tasks added this encounter   9/22/2022 - Refill Call (Auto Added)   Tasks due within next 3 months   No tasks due.     Kaley Oden [facility]  [address] [place] [id number]  Phone: [phone]  Fax: [phone]

## 2022-09-14 DIAGNOSIS — I10 ESSENTIAL HYPERTENSION: ICD-10-CM

## 2022-09-27 ENCOUNTER — SPECIALTY PHARMACY (OUTPATIENT)
Dept: PHARMACY | Facility: CLINIC | Age: 67
End: 2022-09-27
Payer: MEDICARE

## 2022-09-27 NOTE — TELEPHONE ENCOUNTER
Specialty Pharmacy - Refill Coordination    Specialty Medication Orders Linked to Encounter      Flowsheet Row Most Recent Value   Medication #1 alirocumab (PRALUENT PEN) 75 mg/mL PnIj (Order#[id number], Rx#[id number])            Refill Questions - Documented Responses      Flowsheet Row Most Recent Value   Patient Availability and HIPAA Verification    Does patient want to proceed with activity? Yes   HIPAA/medical authority confirmed? Yes   Relationship to patient of person spoken to? Self   Refill Screening Questions    Would patient like to speak to a pharmacist? No   When does the patient need to receive the medication? 10/01/22   Refill Delivery Questions    How will the patient receive the medication? Delivery Marsha   When does the patient need to receive the medication? 10/01/22   Shipping Address Home   Address in [facility] confirmed and updated if neccessary? Yes   Expected Copay ($) 25   Is the patient able to afford the medication copay? Yes   Payment Method CC on file   Days supply of Refill 28   Supplies needed? No supplies needed   Refill activity completed? Yes   Refill activity plan Refill scheduled   Shipment/Pickup Date: 09/28/22            Current Outpatient Medications   Medication Sig    alirocumab (PRALUENT PEN) 75 mg/mL PnIj Inject 1 mL (75 mg total) into the skin every 14 (fourteen) days.    aspirin (ECOTRIN) 81 MG EC tablet TAKE 1 TABLET BY MOUTH EVERY DAY    ciprofloxacin HCl (CILOXAN) 0.3 % ophthalmic solution 5 drops to left EAR twice daily X 10 days (Patient not taking: Reported on 7/29/2022)    ciprofloxacin-dexamethasone 0.3-0.1% (CIPRODEX) 0.3-0.1 % DrpS Place 4 drops into the left ear 2 (two) times daily. (Patient not taking: Reported on 7/29/2022)    clopidogreL (PLAVIX) 75 mg tablet TAKE 1 TABLET BY MOUTH EVERY DAY    cyanocobalamin (VITAMIN B-12) 1000 MCG tablet Take 1 tablet (1,000 mcg total) by mouth once daily.    FOLBEE 2.5-25-1 mg Tab TAKE 1 TABLET BY MOUTH EVERY DAY     gemfibroziL (LOPID) 600 MG tablet TAKE 1 TABLET (600 MG TOTAL) BY MOUTH 2 (TWO) TIMES DAILY BEFORE MEALS    HYDROcodone-acetaminophen (NORCO) 5-325 mg per tablet Take 1 tablet by mouth every 6 (six) hours as needed for Pain.    ibuprofen (ADVIL,MOTRIN) 200 MG tablet Take by mouth every 6 (six) hours as needed.    ipratropium (ATROVENT) 42 mcg (0.06 %) nasal spray 2 sprays by Nasal route 3 (three) times daily. Use 2-3 times per day if necessary for chronic nasal drip    lisinopriL 10 MG tablet TAKE 1 TABLET BY MOUTH EVERY DAY    pantoprazole (PROTONIX) 40 MG tablet Take 40 mg by mouth.    sildenafiL (VIAGRA) 50 MG tablet Take 1 tablet (50 mg total) by mouth daily as needed for Erectile Dysfunction. (Patient not taking: No sig reported)    triamcinolone acetonide 0.1% (KENALOG) 0.1 % cream Apply topically.    zolpidem (AMBIEN) 5 MG Tab Take 1 tablet (5 mg total) by mouth nightly as needed.   Last reviewed on 8/18/2022  1:55 PM by Marimar Jimenez NP    Review of patient's allergies indicates:   Allergen Reactions    Statins-hmg-coa reductase inhibitors Other (See Comments)     Not allergy, makes him feel awful    Last reviewed on  8/18/2022 1:11 PM by Dunia Bowie      Tasks added this encounter   10/22/2022 - Refill Call (Auto Added)   Tasks due within next 3 months   No tasks due.     Any Oden [facility]  [address] [place] [id number]  Phone: [phone]  Fax: [phone]

## 2022-10-24 ENCOUNTER — TELEPHONE (OUTPATIENT)
Dept: FAMILY MEDICINE | Facility: CLINIC | Age: 67
End: 2022-10-24
Payer: MEDICARE

## 2022-10-24 NOTE — TELEPHONE ENCOUNTER
----- Message from Whitley Olson sent at 10/24/2022  4:33 PM CDT -----  Contact: Self  Type:  Same Day Appointment Request    Caller is requesting a same day appointment.  Caller declined first available appointment listed below.      Name of Caller:  Patient  When is the first available appointment?  11/22  Symptoms:  Back is out and needs to see Dr Tasha Barbour Call Back Number:  [phone]  Additional Information:  Please call pt to back schedule. Thank You

## 2022-10-24 NOTE — TELEPHONE ENCOUNTER
Pt requests an appt for back pain. Advised next available is in about a month, offered to schedule sooner with another provider and pt declined

## 2022-10-25 ENCOUNTER — SPECIALTY PHARMACY (OUTPATIENT)
Dept: PHARMACY | Facility: CLINIC | Age: 67
End: 2022-10-25
Payer: MEDICARE

## 2022-11-01 ENCOUNTER — PATIENT MESSAGE (OUTPATIENT)
Dept: ADMINISTRATIVE | Facility: HOSPITAL | Age: 67
End: 2022-11-01
Payer: MEDICARE

## 2022-11-22 ENCOUNTER — PATIENT MESSAGE (OUTPATIENT)
Dept: PHARMACY | Facility: CLINIC | Age: 67
End: 2022-11-22
Payer: MEDICARE

## 2022-12-27 ENCOUNTER — SPECIALTY PHARMACY (OUTPATIENT)
Dept: PHARMACY | Facility: CLINIC | Age: 67
End: 2022-12-27
Payer: MEDICARE

## 2022-12-27 NOTE — TELEPHONE ENCOUNTER
Praluent copay card rejecting. Will route to [facility]. Based on last encounter, patient will be due to inject 1/1.

## 2022-12-27 NOTE — TELEPHONE ENCOUNTER
During refill call patient states that his medicare should be covering his prescription drug costs. Unable to find Medicare drug coverage for patient.  Patient was unwilling to discuss drug coverage further.     Verified with , Sharron, that the patients current plan on file is a commercial drug plan. Rx set up for refill based off of patients direction.

## 2023-01-12 ENCOUNTER — TELEPHONE (OUTPATIENT)
Dept: ORTHOPEDICS | Facility: CLINIC | Age: 68
End: 2023-01-12
Payer: MEDICARE

## 2023-01-12 NOTE — TELEPHONE ENCOUNTER
Spoke with patient. Appointment scheduled with Dr. Silva and SOY paperwork left downstairs registration desk entrance 3.

## 2023-01-12 NOTE — TELEPHONE ENCOUNTER
----- Message from Terrence Maguire sent at 1/12/2023 11:10 AM CST -----  Type: Needs Medical Advice  Who Called:  Patient     Best Call Back Number: [phone]  Additional Information: Patient states that he would like a callback.  Patient refused to state what the call was about.

## 2023-01-22 DIAGNOSIS — M25.561 RIGHT KNEE PAIN: Primary | ICD-10-CM

## 2023-01-23 ENCOUNTER — OFFICE VISIT (OUTPATIENT)
Dept: ORTHOPEDICS | Facility: CLINIC | Age: 68
End: 2023-01-23
Payer: MEDICARE

## 2023-01-23 ENCOUNTER — HOSPITAL ENCOUNTER (OUTPATIENT)
Dept: RADIOLOGY | Facility: HOSPITAL | Age: 68
Discharge: HOME | End: 2023-01-23
Attending: ORTHOPAEDIC SURGERY
Payer: MEDICARE

## 2023-01-23 VITALS — HEIGHT: 68 IN | BODY MASS INDEX: 27.43 KG/M2 | WEIGHT: 181 LBS

## 2023-01-23 DIAGNOSIS — M17.0 PRIMARY OSTEOARTHRITIS OF BOTH KNEES: ICD-10-CM

## 2023-01-23 DIAGNOSIS — G89.29 CHRONIC LEFT SHOULDER PAIN: Primary | ICD-10-CM

## 2023-01-23 DIAGNOSIS — M25.561 RIGHT KNEE PAIN: ICD-10-CM

## 2023-01-23 DIAGNOSIS — M25.512 CHRONIC LEFT SHOULDER PAIN: Primary | ICD-10-CM

## 2023-01-23 PROCEDURE — 73560 X-RAY EXAM OF KNEE 1 OR 2: CPT | Mod: TC,PO,LT

## 2023-01-23 PROCEDURE — 73562 X-RAY EXAM OF KNEE 3: CPT | Mod: 26,RT,, | Performed by: RADIOLOGY

## 2023-01-23 PROCEDURE — 73562 XR KNEE ORTHO RIGHT: ICD-10-PCS | Mod: 26,RT,, | Performed by: RADIOLOGY

## 2023-01-23 PROCEDURE — 20610 DRAIN/INJ JOINT/BURSA W/O US: CPT | Mod: RT,S$GLB,, | Performed by: ORTHOPAEDIC SURGERY

## 2023-01-23 PROCEDURE — 99999 PR PBB SHADOW E&M-EST. PATIENT-LVL III: ICD-10-PCS | Mod: PBBFAC,,, | Performed by: ORTHOPAEDIC SURGERY

## 2023-01-23 PROCEDURE — 73560 X-RAY EXAM OF KNEE 1 OR 2: CPT | Mod: 26,LT,, | Performed by: RADIOLOGY

## 2023-01-23 PROCEDURE — 99999 PR PBB SHADOW E&M-EST. PATIENT-LVL III: CPT | Mod: PBBFAC,,, | Performed by: ORTHOPAEDIC SURGERY

## 2023-01-23 PROCEDURE — 20610 LARGE JOINT ASPIRATION/INJECTION: R KNEE: ICD-10-PCS | Mod: RT,S$GLB,, | Performed by: ORTHOPAEDIC SURGERY

## 2023-01-23 PROCEDURE — 99214 OFFICE O/P EST MOD 30 MIN: CPT | Mod: 25,S$GLB,, | Performed by: ORTHOPAEDIC SURGERY

## 2023-01-23 PROCEDURE — 99214 PR OFFICE/OUTPT VISIT, EST, LEVL IV, 30-39 MIN: ICD-10-PCS | Mod: 25,S$GLB,, | Performed by: ORTHOPAEDIC SURGERY

## 2023-01-23 PROCEDURE — 73560 XR KNEE ORTHO RIGHT: ICD-10-PCS | Mod: 26,LT,, | Performed by: RADIOLOGY

## 2023-01-23 RX ORDER — TRIAMCINOLONE ACETONIDE 40 MG/ML
40 INJECTION, SUSPENSION INTRA-ARTICULAR; INTRAMUSCULAR
Status: DISCONTINUED | OUTPATIENT
Start: 2023-01-23 | End: 2023-01-23 | Stop reason: HOSPADM

## 2023-01-23 RX ADMIN — TRIAMCINOLONE ACETONIDE 40 MG: 40 INJECTION, SUSPENSION INTRA-ARTICULAR; INTRAMUSCULAR at 09:01

## 2023-01-23 NOTE — PROGRESS NOTES
67 years old recurrent pain right knee points the medial knee as location of his pain requesting injection today     Exam shows tenderness the joint line no signs infection instability skin is intact compartments are soft     X-rays show degenerative disease well placed components on the left     Assessment:  Degenerative disease right knee     Plan:  Kenalog injection right knee, continue with strengthening over time, follow-up as needed

## 2023-01-23 NOTE — PROCEDURES
Large Joint Aspiration/Injection: R knee    Date/Time: 1/23/2023 9:15 AM  Performed by: Nelson Silva MD  Authorized by: Nelson Silva MD     Consent Done?:  Yes (Verbal)  Timeout: prior to procedure the correct patient, procedure, and site was verified    Prep: patient was prepped and draped in usual sterile fashion      Details:  Needle Size:  21 G  Approach:  Anterolateral  Location:  Knee  Site:  R knee  Medications:  40 mg triamcinolone acetonide 40 mg/mL  Patient tolerance:  Patient tolerated the procedure well with no immediate complications

## 2023-01-26 ENCOUNTER — LAB VISIT (OUTPATIENT)
Dept: LAB | Facility: HOSPITAL | Age: 68
End: 2023-01-26
Attending: INTERNAL MEDICINE
Payer: MEDICARE

## 2023-01-26 ENCOUNTER — OFFICE VISIT (OUTPATIENT)
Dept: FAMILY MEDICINE | Facility: CLINIC | Age: 68
End: 2023-01-26
Payer: MEDICARE

## 2023-01-26 ENCOUNTER — SPECIALTY PHARMACY (OUTPATIENT)
Dept: PHARMACY | Facility: CLINIC | Age: 68
End: 2023-01-26
Payer: MEDICARE

## 2023-01-26 VITALS
BODY MASS INDEX: 26.76 KG/M2 | HEART RATE: 71 BPM | DIASTOLIC BLOOD PRESSURE: 80 MMHG | SYSTOLIC BLOOD PRESSURE: 118 MMHG | OXYGEN SATURATION: 96 % | WEIGHT: 176.56 LBS | HEIGHT: 68 IN

## 2023-01-26 DIAGNOSIS — Z85.819 HISTORY OF NASOPHARYNGEAL CANCER: ICD-10-CM

## 2023-01-26 DIAGNOSIS — I67.2 CEREBRAL ATHEROSCLEROSIS: ICD-10-CM

## 2023-01-26 DIAGNOSIS — I69.351 HEMIPLEGIA AND HEMIPARESIS FOLLOWING CEREBRAL INFARCTION AFFECTING RIGHT DOMINANT SIDE: ICD-10-CM

## 2023-01-26 DIAGNOSIS — E78.2 MIXED HYPERLIPIDEMIA: ICD-10-CM

## 2023-01-26 DIAGNOSIS — Z12.5 PROSTATE CANCER SCREENING: ICD-10-CM

## 2023-01-26 DIAGNOSIS — I10 ESSENTIAL HYPERTENSION: ICD-10-CM

## 2023-01-26 DIAGNOSIS — Z86.73 HISTORY OF CVA (CEREBROVASCULAR ACCIDENT): ICD-10-CM

## 2023-01-26 DIAGNOSIS — N52.1 ERECTILE DYSFUNCTION DUE TO DISEASES CLASSIFIED ELSEWHERE: ICD-10-CM

## 2023-01-26 DIAGNOSIS — I10 ESSENTIAL HYPERTENSION: Primary | ICD-10-CM

## 2023-01-26 LAB
ALBUMIN SERPL BCP-MCNC: 3.8 G/DL (ref 3.5–5.2)
ALP SERPL-CCNC: 85 U/L (ref 55–135)
ALT SERPL W/O P-5'-P-CCNC: 23 U/L (ref 10–44)
ANION GAP SERPL CALC-SCNC: 7 MMOL/L (ref 8–16)
AST SERPL-CCNC: 18 U/L (ref 10–40)
BILIRUB SERPL-MCNC: 0.4 MG/DL (ref 0.1–1)
BUN SERPL-MCNC: 12 MG/DL (ref 8–23)
CALCIUM SERPL-MCNC: 9.6 MG/DL (ref 8.7–10.5)
CHLORIDE SERPL-SCNC: 103 MMOL/L (ref 95–110)
CHOLEST SERPL-MCNC: 166 MG/DL (ref 120–199)
CHOLEST/HDLC SERPL: 4.3 {RATIO} (ref 2–5)
CO2 SERPL-SCNC: 28 MMOL/L (ref 23–29)
COMPLEXED PSA SERPL-MCNC: 11.3 NG/ML (ref 0–4)
CREAT SERPL-MCNC: 1.2 MG/DL (ref 0.5–1.4)
EST. GFR  (NO RACE VARIABLE): >60 ML/MIN/1.73 M^2
GLUCOSE SERPL-MCNC: 88 MG/DL (ref 70–110)
HDLC SERPL-MCNC: 39 MG/DL (ref 40–75)
HDLC SERPL: 23.5 % (ref 20–50)
LDLC SERPL CALC-MCNC: 87.2 MG/DL (ref 63–159)
NONHDLC SERPL-MCNC: 127 MG/DL
POTASSIUM SERPL-SCNC: 4.9 MMOL/L (ref 3.5–5.1)
PROT SERPL-MCNC: 7.2 G/DL (ref 6–8.4)
SODIUM SERPL-SCNC: 138 MMOL/L (ref 136–145)
TRIGL SERPL-MCNC: 199 MG/DL (ref 30–150)

## 2023-01-26 PROCEDURE — 99999 PR PBB SHADOW E&M-EST. PATIENT-LVL IV: ICD-10-PCS | Mod: PBBFAC,,, | Performed by: INTERNAL MEDICINE

## 2023-01-26 PROCEDURE — 3079F PR MOST RECENT DIASTOLIC BLOOD PRESSURE 80-89 MM HG: ICD-10-PCS | Mod: CPTII,S$GLB,, | Performed by: INTERNAL MEDICINE

## 2023-01-26 PROCEDURE — 90677 PNEUMOCOCCAL CONJUGATE VACCINE 20-VALENT: ICD-10-PCS | Mod: S$GLB,,, | Performed by: INTERNAL MEDICINE

## 2023-01-26 PROCEDURE — 3288F PR FALLS RISK ASSESSMENT DOCUMENTED: ICD-10-PCS | Mod: CPTII,S$GLB,, | Performed by: INTERNAL MEDICINE

## 2023-01-26 PROCEDURE — 1101F PR PT FALLS ASSESS DOC 0-1 FALLS W/OUT INJ PAST YR: ICD-10-PCS | Mod: CPTII,S$GLB,, | Performed by: INTERNAL MEDICINE

## 2023-01-26 PROCEDURE — 99999 PR PBB SHADOW E&M-EST. PATIENT-LVL IV: CPT | Mod: PBBFAC,,, | Performed by: INTERNAL MEDICINE

## 2023-01-26 PROCEDURE — 99214 OFFICE O/P EST MOD 30 MIN: CPT | Mod: 25,S$GLB,, | Performed by: INTERNAL MEDICINE

## 2023-01-26 PROCEDURE — 3008F BODY MASS INDEX DOCD: CPT | Mod: CPTII,S$GLB,, | Performed by: INTERNAL MEDICINE

## 2023-01-26 PROCEDURE — 3288F FALL RISK ASSESSMENT DOCD: CPT | Mod: CPTII,S$GLB,, | Performed by: INTERNAL MEDICINE

## 2023-01-26 PROCEDURE — 85025 COMPLETE CBC W/AUTO DIFF WBC: CPT | Performed by: INTERNAL MEDICINE

## 2023-01-26 PROCEDURE — 4010F ACE/ARB THERAPY RXD/TAKEN: CPT | Mod: CPTII,S$GLB,, | Performed by: INTERNAL MEDICINE

## 2023-01-26 PROCEDURE — 3079F DIAST BP 80-89 MM HG: CPT | Mod: CPTII,S$GLB,, | Performed by: INTERNAL MEDICINE

## 2023-01-26 PROCEDURE — 99214 PR OFFICE/OUTPT VISIT, EST, LEVL IV, 30-39 MIN: ICD-10-PCS | Mod: 25,S$GLB,, | Performed by: INTERNAL MEDICINE

## 2023-01-26 PROCEDURE — 4010F PR ACE/ARB THEARPY RXD/TAKEN: ICD-10-PCS | Mod: CPTII,S$GLB,, | Performed by: INTERNAL MEDICINE

## 2023-01-26 PROCEDURE — 3008F PR BODY MASS INDEX (BMI) DOCUMENTED: ICD-10-PCS | Mod: CPTII,S$GLB,, | Performed by: INTERNAL MEDICINE

## 2023-01-26 PROCEDURE — 84153 ASSAY OF PSA TOTAL: CPT | Performed by: INTERNAL MEDICINE

## 2023-01-26 PROCEDURE — 1159F MED LIST DOCD IN RCRD: CPT | Mod: CPTII,S$GLB,, | Performed by: INTERNAL MEDICINE

## 2023-01-26 PROCEDURE — 1160F RVW MEDS BY RX/DR IN RCRD: CPT | Mod: CPTII,S$GLB,, | Performed by: INTERNAL MEDICINE

## 2023-01-26 PROCEDURE — 3074F SYST BP LT 130 MM HG: CPT | Mod: CPTII,S$GLB,, | Performed by: INTERNAL MEDICINE

## 2023-01-26 PROCEDURE — 90677 PCV20 VACCINE IM: CPT | Mod: S$GLB,,, | Performed by: INTERNAL MEDICINE

## 2023-01-26 PROCEDURE — 1159F PR MEDICATION LIST DOCUMENTED IN MEDICAL RECORD: ICD-10-PCS | Mod: CPTII,S$GLB,, | Performed by: INTERNAL MEDICINE

## 2023-01-26 PROCEDURE — 3074F PR MOST RECENT SYSTOLIC BLOOD PRESSURE < 130 MM HG: ICD-10-PCS | Mod: CPTII,S$GLB,, | Performed by: INTERNAL MEDICINE

## 2023-01-26 PROCEDURE — 1101F PT FALLS ASSESS-DOCD LE1/YR: CPT | Mod: CPTII,S$GLB,, | Performed by: INTERNAL MEDICINE

## 2023-01-26 PROCEDURE — 80053 COMPREHEN METABOLIC PANEL: CPT | Performed by: INTERNAL MEDICINE

## 2023-01-26 PROCEDURE — 80061 LIPID PANEL: CPT | Performed by: INTERNAL MEDICINE

## 2023-01-26 PROCEDURE — 1160F PR REVIEW ALL MEDS BY PRESCRIBER/CLIN PHARMACIST DOCUMENTED: ICD-10-PCS | Mod: CPTII,S$GLB,, | Performed by: INTERNAL MEDICINE

## 2023-01-26 PROCEDURE — 36415 COLL VENOUS BLD VENIPUNCTURE: CPT | Mod: PO | Performed by: INTERNAL MEDICINE

## 2023-01-26 PROCEDURE — G0009 PNEUMOCOCCAL CONJUGATE VACCINE 20-VALENT: ICD-10-PCS | Mod: S$GLB,,, | Performed by: INTERNAL MEDICINE

## 2023-01-26 PROCEDURE — G0009 ADMIN PNEUMOCOCCAL VACCINE: HCPCS | Mod: S$GLB,,, | Performed by: INTERNAL MEDICINE

## 2023-01-26 RX ORDER — CLOPIDOGREL BISULFATE 75 MG/1
75 TABLET ORAL DAILY
Qty: 90 TABLET | Refills: 3 | Status: SHIPPED | OUTPATIENT
Start: 2023-01-26 | End: 2023-07-26 | Stop reason: SDUPTHER

## 2023-01-26 NOTE — PROGRESS NOTES
Patient ID: Edy Bartlett III     Chief Complaint:   Chief Complaint   Patient presents with    Numbness to right hand        HPI:  Patient complains of some left upper extremity weakness and weakness in his left hand specially but occurred spontaneously 4 days ago.  Just prior to this he drove himself to town to get some fast food and was reaching for his drink at home when he noticed the weakness and inability to close his left hand.  He was very distressed because he does have a history of a stroke.  He was by himself so he called his wife and decided to move around quite a bit to get his blood flowing better and after a few moments he gained more feeling and range of motion in his left upper extremity.  The symptoms have not recurred.  Coincidentally, he had been off of his Plavix and aspirin and lisinopril for a few days while he was in between prescription refills.  He has been dutiful about taking them ever since and he is relatively asymptomatic today.  I offered a repeat workup for modifiable risk factors for stroke including an MRI of his brain and an echocardiogram  but he respectfully declines and I agree because it would not change our management at this point.  I will get a Carotid Ultrasound as I hear a bruit on the Left. His vital signs do look very good he looks well. His symptoms could have been due to degenerative joint disease in his neck causing a pinched nerve. I would like to give him a Prevnar 20 today to complete his pneumonia shot prophylaxis and I do want him to get a shingles vaccine from his pharmacy in about 1 month.  He is due for colon cancer screening but he respectfully declines right now so we may consider a stool occult blood test at a later date.  He is also due for labs which we will get before we leave today.    Review of Systems   Constitutional: Negative.    HENT: Negative.     Eyes: Negative.    Respiratory: Negative.     Cardiovascular: Negative.    Gastrointestinal:  "Negative.    Endocrine: Negative.    Genitourinary: Negative.    Musculoskeletal: Negative.    Skin: Negative.    Allergic/Immunologic: Negative.    Neurological: Negative.    Hematological: Negative.    Psychiatric/Behavioral: Negative.          Objective:      Physical Exam   Physical Exam  Vitals and nursing note reviewed.   Constitutional:       Appearance: Normal appearance. He is well-developed.   HENT:      Head: Normocephalic and atraumatic.      Nose: Nose normal.      Mouth/Throat:      Mouth: Mucous membranes are moist.   Eyes:      Extraocular Movements: Extraocular movements intact.      Conjunctiva/sclera: Conjunctivae normal.      Pupils: Pupils are equal, round, and reactive to light.   Cardiovascular:      Rate and Rhythm: Normal rate and regular rhythm.      Pulses: Normal pulses.      Heart sounds: Normal heart sounds.   Pulmonary:      Effort: Pulmonary effort is normal.      Breath sounds: Normal breath sounds.   Abdominal:      General: Bowel sounds are normal.      Palpations: Abdomen is soft.   Musculoskeletal:      Cervical back: Normal range of motion and neck supple.      Comments: Decreased Range of Motion in neck due to previous surgery    Skin:     General: Skin is warm and dry.      Capillary Refill: Capillary refill takes less than 2 seconds.   Neurological:      General: No focal deficit present.      Mental Status: He is alert and oriented to person, place, and time.   Psychiatric:         Mood and Affect: Mood normal.         Behavior: Behavior normal.         Thought Content: Thought content normal.         Judgment: Judgment normal.          Vitals:   Vitals:    01/26/23 1350   BP: 118/80   Pulse: 71   SpO2: 96%   Weight: 80.1 kg (176 lb 9.4 oz)   Height: 5' 8" (1.727 m)          Current Outpatient Medications:     alirocumab (PRALUENT PEN) 75 mg/mL PnIj, Inject 1 mL (75 mg total) into the skin every 14 (fourteen) days., Disp: 2 mL, Rfl: 12    aspirin (ECOTRIN) 81 MG EC tablet, " TAKE 1 TABLET BY MOUTH EVERY DAY, Disp: 90 tablet, Rfl: 3    ciprofloxacin HCl (CILOXAN) 0.3 % ophthalmic solution, 5 drops to left EAR twice daily X 10 days, Disp: 10 mL, Rfl: 0    ciprofloxacin-dexamethasone 0.3-0.1% (CIPRODEX) 0.3-0.1 % DrpS, Place 4 drops into the left ear 2 (two) times daily., Disp: 7.5 mL, Rfl: 0    cyanocobalamin (VITAMIN B-12) 1000 MCG tablet, Take 1 tablet (1,000 mcg total) by mouth once daily., Disp: 90 tablet, Rfl: 3    FOLBEE 2.5-25-1 mg Tab, TAKE 1 TABLET BY MOUTH EVERY DAY, Disp: 90 tablet, Rfl: 3    gemfibroziL (LOPID) 600 MG tablet, TAKE 1 TABLET (600 MG TOTAL) BY MOUTH 2 (TWO) TIMES DAILY BEFORE MEALS, Disp: 60 tablet, Rfl: 0    HYDROcodone-acetaminophen (NORCO) 5-325 mg per tablet, Take 1 tablet by mouth every 6 (six) hours as needed for Pain., Disp: 30 tablet, Rfl: 0    ibuprofen (ADVIL,MOTRIN) 200 MG tablet, Take by mouth every 6 (six) hours as needed., Disp: , Rfl:     ipratropium (ATROVENT) 42 mcg (0.06 %) nasal spray, 2 sprays by Nasal route 3 (three) times daily. Use 2-3 times per day if necessary for chronic nasal drip, Disp: 15 mL, Rfl: 12    lisinopriL 10 MG tablet, TAKE 1 TABLET BY MOUTH EVERY DAY, Disp: 90 tablet, Rfl: 3    triamcinolone acetonide 0.1% (KENALOG) 0.1 % cream, Apply topically., Disp: , Rfl:     clopidogreL (PLAVIX) 75 mg tablet, Take 1 tablet (75 mg total) by mouth once daily., Disp: 90 tablet, Rfl: 3    pantoprazole (PROTONIX) 40 MG tablet, Take 40 mg by mouth., Disp: , Rfl:     sildenafiL (VIAGRA) 50 MG tablet, Take 1 tablet (50 mg total) by mouth daily as needed for Erectile Dysfunction. (Patient not taking: No sig reported), Disp: 10 tablet, Rfl: 3    zolpidem (AMBIEN) 5 MG Tab, Take 1 tablet (5 mg total) by mouth nightly as needed., Disp: 30 tablet, Rfl: 3   Assessment:       Patient Active Problem List    Diagnosis Date Noted    Hemiplegia and hemiparesis following cerebral infarction affecting right dominant side 01/26/2023    History of  nasopharyngeal cancer 01/26/2023    Rotator cuff arthropathy of both shoulders 02/02/2022    History of CVA (cerebrovascular accident) 06/02/2021    Insomnia due to medical condition 02/01/2021    Frequent falls 08/14/2020    Cerebrovascular accident (CVA) due to occlusion of left cerebellar artery 05/08/2020    Cervicalgia 05/08/2020    Upper airway cough syndrome 03/27/2020    Degenerative disc disease, cervical 03/27/2020    B12 deficiency 03/03/2020    Folate deficiency 03/03/2020    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    Diagnoses and all orders for this visit:    Edy was seen today for numbness to right hand.    Diagnoses and all orders for this visit:    Essential hypertension  -     CBC Auto Differential; Future  Controlled with med     History of CVA (cerebrovascular accident)  -     clopidogreL (PLAVIX) 75 mg tablet; Take 1 tablet (75 mg total) by mouth once daily.  -     US Carotid Bilateral; Future  Left Upper Extremity weakness has resolved     Erectile dysfunction due to diseases classified elsewhere  -     Ambulatory referral/consult to Urology; Future    Prostate cancer screening  -     PSA, Screening; Future  Check labs      Mixed hyperlipidemia  -     Comprehensive Metabolic Panel; Future  -     Lipid Panel; Future  Check labs      Hemiplegia and hemiparesis following cerebral infarction affecting right dominant side  Monitor     History of nasopharyngeal cancer  Resolved     Cerebral atherosclerosis  -     US Carotid Bilateral; Future    Other orders  -     (In Office  Administered) Pneumococcal Conjugate Vaccine (20 Valent) (IM)

## 2023-01-26 NOTE — TELEPHONE ENCOUNTER
Outgoing call regarding praluent refill; per pt, he's due to inject on 2/1 and has a pen on hand; next injection is on 2/15; informed him that [facility] will follow up on 2/8 to schedule delivery

## 2023-01-27 ENCOUNTER — TELEPHONE (OUTPATIENT)
Dept: FAMILY MEDICINE | Facility: CLINIC | Age: 68
End: 2023-01-27
Payer: MEDICARE

## 2023-01-27 LAB
BASOPHILS # BLD AUTO: 0.04 K/UL (ref 0–0.2)
BASOPHILS NFR BLD: 0.6 % (ref 0–1.9)
DIFFERENTIAL METHOD: ABNORMAL
EOSINOPHIL # BLD AUTO: 0 K/UL (ref 0–0.5)
EOSINOPHIL NFR BLD: 0.6 % (ref 0–8)
ERYTHROCYTE [DISTWIDTH] IN BLOOD BY AUTOMATED COUNT: 13.3 % (ref 11.5–14.5)
HCT VFR BLD AUTO: 51.6 % (ref 40–54)
HGB BLD-MCNC: 17 G/DL (ref 14–18)
IMM GRANULOCYTES # BLD AUTO: 0.02 K/UL (ref 0–0.04)
IMM GRANULOCYTES NFR BLD AUTO: 0.3 % (ref 0–0.5)
LYMPHOCYTES # BLD AUTO: 1.2 K/UL (ref 1–4.8)
LYMPHOCYTES NFR BLD: 18.5 % (ref 18–48)
MCH RBC QN AUTO: 31.4 PG (ref 27–31)
MCHC RBC AUTO-ENTMCNC: 32.9 G/DL (ref 32–36)
MCV RBC AUTO: 95 FL (ref 82–98)
MONOCYTES # BLD AUTO: 0.7 K/UL (ref 0.3–1)
MONOCYTES NFR BLD: 10.3 % (ref 4–15)
NEUTROPHILS # BLD AUTO: 4.4 K/UL (ref 1.8–7.7)
NEUTROPHILS NFR BLD: 69.7 % (ref 38–73)
NRBC BLD-RTO: 0 /100 WBC
PLATELET # BLD AUTO: 227 K/UL (ref 150–450)
PMV BLD AUTO: 9.3 FL (ref 9.2–12.9)
RBC # BLD AUTO: 5.42 M/UL (ref 4.6–6.2)
WBC # BLD AUTO: 6.34 K/UL (ref 3.9–12.7)

## 2023-01-31 ENCOUNTER — PATIENT OUTREACH (OUTPATIENT)
Dept: ADMINISTRATIVE | Facility: HOSPITAL | Age: 68
End: 2023-01-31
Payer: MEDICARE

## 2023-02-16 ENCOUNTER — TELEPHONE (OUTPATIENT)
Dept: FAMILY MEDICINE | Facility: CLINIC | Age: 68
End: 2023-02-16
Payer: MEDICARE

## 2023-02-16 NOTE — TELEPHONE ENCOUNTER
----- Message from Jose Banda sent at 2/13/2023  4:27 PM CST -----  Contact: parameds  Type: Needs Medical Advice  Who Called:  Viry    Best Call Back Number: [phone] ext [phone]  Additional Information: Viry would like to know has pt signed a medical records release form. Please advise.

## 2023-02-20 ENCOUNTER — SPECIALTY PHARMACY (OUTPATIENT)
Dept: PHARMACY | Facility: CLINIC | Age: 68
End: 2023-02-20
Payer: MEDICARE

## 2023-02-20 NOTE — TELEPHONE ENCOUNTER
Specialty Pharmacy - Refill Coordination    Specialty Medication Orders Linked to Encounter      Flowsheet Row Most Recent Value   Medication #1 alirocumab (PRALUENT PEN) 75 mg/mL PnIj (Order#[id number], Rx#[id number])                Current Outpatient Medications   Medication Sig    alirocumab (PRALUENT PEN) 75 mg/mL PnIj Inject 1 mL (75 mg total) into the skin every 14 (fourteen) days.    aspirin (ECOTRIN) 81 MG EC tablet TAKE 1 TABLET BY MOUTH EVERY DAY    ciprofloxacin HCl (CILOXAN) 0.3 % ophthalmic solution 5 drops to left EAR twice daily X 10 days    ciprofloxacin-dexamethasone 0.3-0.1% (CIPRODEX) 0.3-0.1 % DrpS Place 4 drops into the left ear 2 (two) times daily.    clopidogreL (PLAVIX) 75 mg tablet Take 1 tablet (75 mg total) by mouth once daily.    cyanocobalamin (VITAMIN B-12) 1000 MCG tablet Take 1 tablet (1,000 mcg total) by mouth once daily.    FOLBEE 2.5-25-1 mg Tab TAKE 1 TABLET BY MOUTH EVERY DAY    gemfibroziL (LOPID) 600 MG tablet TAKE 1 TABLET (600 MG TOTAL) BY MOUTH 2 (TWO) TIMES DAILY BEFORE MEALS    HYDROcodone-acetaminophen (NORCO) 5-325 mg per tablet Take 1 tablet by mouth every 6 (six) hours as needed for Pain.    ibuprofen (ADVIL,MOTRIN) 200 MG tablet Take by mouth every 6 (six) hours as needed.    ipratropium (ATROVENT) 42 mcg (0.06 %) nasal spray 2 sprays by Nasal route 3 (three) times daily. Use 2-3 times per day if necessary for chronic nasal drip    lisinopriL 10 MG tablet TAKE 1 TABLET BY MOUTH EVERY DAY    pantoprazole (PROTONIX) 40 MG tablet Take 40 mg by mouth.    sildenafiL (VIAGRA) 50 MG tablet Take 1 tablet (50 mg total) by mouth daily as needed for Erectile Dysfunction. (Patient not taking: No sig reported)    triamcinolone acetonide 0.1% (KENALOG) 0.1 % cream Apply topically.    zolpidem (AMBIEN) 5 MG Tab Take 1 tablet (5 mg total) by mouth nightly as needed.   Last reviewed on 1/26/2023  2:29 PM by Huy Cordova MD    Review of patient's allergies indicates:   Allergen  Reactions    Statins-hmg-coa reductase inhibitors Other (See Comments)     Not allergy, makes him feel awful    Last reviewed on  1/26/2023 2:29 PM by Huy Cordova      Tasks added this encounter   No tasks added.   Tasks due within next 3 months   2/8/2023 - Refill Call (Auto Added)     Lucero Jha, PharmD  [facility]  [address]  [facility] [address]  Phone: [phone]  Fax: [phone]

## 2023-04-11 ENCOUNTER — PATIENT MESSAGE (OUTPATIENT)
Dept: ADMINISTRATIVE | Facility: HOSPITAL | Age: 68
End: 2023-04-11
Payer: MEDICARE

## 2023-04-18 ENCOUNTER — OFFICE VISIT (OUTPATIENT)
Dept: OTOLARYNGOLOGY | Facility: CLINIC | Age: 68
End: 2023-04-18
Payer: MEDICARE

## 2023-04-18 VITALS — HEIGHT: 68 IN | WEIGHT: 186.06 LBS | BODY MASS INDEX: 28.2 KG/M2

## 2023-04-18 DIAGNOSIS — H90.3 BILATERAL SENSORINEURAL HEARING LOSS: ICD-10-CM

## 2023-04-18 DIAGNOSIS — H61.23 BILATERAL IMPACTED CERUMEN: Primary | ICD-10-CM

## 2023-04-18 DIAGNOSIS — H74.02 TS (TYMPANOSCLEROSIS), LEFT: ICD-10-CM

## 2023-04-18 DIAGNOSIS — H93.13 TINNITUS, BILATERAL: ICD-10-CM

## 2023-04-18 PROCEDURE — 3288F FALL RISK ASSESSMENT DOCD: CPT | Mod: CPTII,S$GLB,, | Performed by: NURSE PRACTITIONER

## 2023-04-18 PROCEDURE — 3008F BODY MASS INDEX DOCD: CPT | Mod: CPTII,S$GLB,, | Performed by: NURSE PRACTITIONER

## 2023-04-18 PROCEDURE — 4010F ACE/ARB THERAPY RXD/TAKEN: CPT | Mod: CPTII,S$GLB,, | Performed by: NURSE PRACTITIONER

## 2023-04-18 PROCEDURE — 3008F PR BODY MASS INDEX (BMI) DOCUMENTED: ICD-10-PCS | Mod: CPTII,S$GLB,, | Performed by: NURSE PRACTITIONER

## 2023-04-18 PROCEDURE — 4010F PR ACE/ARB THEARPY RXD/TAKEN: ICD-10-PCS | Mod: CPTII,S$GLB,, | Performed by: NURSE PRACTITIONER

## 2023-04-18 PROCEDURE — 69210 REMOVE IMPACTED EAR WAX UNI: CPT | Mod: S$GLB,,, | Performed by: NURSE PRACTITIONER

## 2023-04-18 PROCEDURE — 99213 OFFICE O/P EST LOW 20 MIN: CPT | Mod: 25,S$GLB,, | Performed by: NURSE PRACTITIONER

## 2023-04-18 PROCEDURE — 1160F RVW MEDS BY RX/DR IN RCRD: CPT | Mod: CPTII,S$GLB,, | Performed by: NURSE PRACTITIONER

## 2023-04-18 PROCEDURE — 1126F AMNT PAIN NOTED NONE PRSNT: CPT | Mod: CPTII,S$GLB,, | Performed by: NURSE PRACTITIONER

## 2023-04-18 PROCEDURE — 1159F MED LIST DOCD IN RCRD: CPT | Mod: CPTII,S$GLB,, | Performed by: NURSE PRACTITIONER

## 2023-04-18 PROCEDURE — 99999 PR PBB SHADOW E&M-EST. PATIENT-LVL III: CPT | Mod: PBBFAC,,, | Performed by: NURSE PRACTITIONER

## 2023-04-18 PROCEDURE — 3288F PR FALLS RISK ASSESSMENT DOCUMENTED: ICD-10-PCS | Mod: CPTII,S$GLB,, | Performed by: NURSE PRACTITIONER

## 2023-04-18 PROCEDURE — 1160F PR REVIEW ALL MEDS BY PRESCRIBER/CLIN PHARMACIST DOCUMENTED: ICD-10-PCS | Mod: CPTII,S$GLB,, | Performed by: NURSE PRACTITIONER

## 2023-04-18 PROCEDURE — 99999 PR PBB SHADOW E&M-EST. PATIENT-LVL III: ICD-10-PCS | Mod: PBBFAC,,, | Performed by: NURSE PRACTITIONER

## 2023-04-18 PROCEDURE — 1100F PR PT FALLS ASSESS DOC 2+ FALLS/FALL W/INJURY/YR: ICD-10-PCS | Mod: CPTII,S$GLB,, | Performed by: NURSE PRACTITIONER

## 2023-04-18 PROCEDURE — 99213 PR OFFICE/OUTPT VISIT, EST, LEVL III, 20-29 MIN: ICD-10-PCS | Mod: 25,S$GLB,, | Performed by: NURSE PRACTITIONER

## 2023-04-18 PROCEDURE — 1159F PR MEDICATION LIST DOCUMENTED IN MEDICAL RECORD: ICD-10-PCS | Mod: CPTII,S$GLB,, | Performed by: NURSE PRACTITIONER

## 2023-04-18 PROCEDURE — 69210 PR REMOVAL IMPACTED CERUMEN REQUIRING INSTRUMENTATION, UNILATERAL: ICD-10-PCS | Mod: S$GLB,,, | Performed by: NURSE PRACTITIONER

## 2023-04-18 PROCEDURE — 1100F PTFALLS ASSESS-DOCD GE2>/YR: CPT | Mod: CPTII,S$GLB,, | Performed by: NURSE PRACTITIONER

## 2023-04-18 PROCEDURE — 1126F PR PAIN SEVERITY QUANTIFIED, NO PAIN PRESENT: ICD-10-PCS | Mod: CPTII,S$GLB,, | Performed by: NURSE PRACTITIONER

## 2023-04-18 NOTE — PATIENT INSTRUCTIONS
Tinnitus (Ringing in the Ears)  Tinnitus is the term for a noise in your ear not caused by an outside sound. The noise might be a ringing, buzzing, hissing, roaring. It can vary in pitch and may be soft or quite loud. For some people, tinnitus is a minor nuisance. But for others, the noise can make it hard to hear, work, and even sleep. When tinnitus can't be cured, a number of treatments may offer relief.  What causes tinnitus?  Loud noises, hearing loss, and ear wax can cause tinnitus. So can certain medicines. Large amounts of aspirin or caffeine are sometimes to blame. In many cases, the exact cause of tinnitus is unknown.  How is tinnitus treated?  Identifying and removing the cause is the best way to treat tinnitus. For that reason, your healthcare provider may refer you to an otolaryngologist (ear, nose, and throat doctor). Your hearing may also be checked by an audiologist (hearing specialist). If you have hearing loss, wearing a hearing aid may help your tinnitus. When the cause can't be found, the tinnitus itself may be treated. Some of the treatments are listed below, and your healthcare provider can tell you more about them:  Avoid exposure to loud sounds, which will exacerbate tinnitus.  Wear ear protection around loud noises.  Get your blood pressure check regularly.  If it is high, see your doctor.  Check with your PCP whether labs can be done to check for anemia and hyperthyroid, as these can worsen tinnitus.   Decrease salt intake.  Avoid stimulants such as caffeine and tobacco.  Exercise daily to improve circulation. Poor circulation worsens tinnitus.   Avoid sleep deprivation. Sleep deprivation and insomnia can worsen tinnitus. Get adequate rest.  Reduce aspirin use, if possible. Aspirin as well as NSAIDs and narcotic pain relievers can exacerbate tinnitus.   Stop worrying about the noise.  Stress worsens tinnitus. Recognize the noise as an annoyance and learn to ignore it as much as possible.  Patients with higher rates of anxiety, depression, concentration, or problems sleeping may need to discuss taking something for anxiety or depression with their primary care provider.     Consider a trial of lipoflavonoids, slow-release niacin, or Arches' Tinnitus Formula (over-the-counter).  Purchase a white noise machine to mask tinnitus.  The [employer] Tinnitus Relief joe on your smart phone uses a combination of sounds and relaxing exercises that aim to distract your brain from focusing on tinnitus. Over time the brain learns to focus less on the tinnitus.   When no underlying pathology can be identified, an audiogram is needed to screen for hearing loss. If hearing loss is noted, hearing aids with masking technology are recommended to help relieve tinnitus.   Maskers are small devices that look like hearing aids. They emit a pleasant sound that helps cover up the ringing in your ears, similar to the technology used in noise-cancelling headphones. Hearing aids and maskers are sometimes used together.  Cognitive Behavioral Therapy (CBT), otherwise known as Tinnitus Retraining Therapy (TRT), can be done by either an audiologist or a cognitive behavioral therapist who is certified in TRT. Tinnitus retraining therapy combines biofeedback, counseling and maskers.   Medicines that treat anxiety and depression may ease tinnitus in some people.  Hypnosis or relaxation therapy may help noise seem less severe.    First-line treatment for tinnitus:  Elimination of exacerbating factors such as elevated blood pressure, high sodium intake, caffeine/stimulants, sleep deprivation/insomnia, certain medications, aspirin, exposure to loud sounds, etc. White noise is recommended as first-line treatment.    Second-line treatment for tinnitus:  Maskers (with or without the use of a hearing aid depending on the outcome of your hearing test) and/or Cognitive Behavior Therapy (Tinnitus Retraining Therapy).  To find an audiologist or  Cognitive Behavioral Therapist in your area who is certified in Tinnitus Retraining Therapy, you must contact the American Tinnitus Association at 1-265.623.8492 or www.anna.org.    For more information  American Speech-Hearing-Language Association [phone] www.jen.org  American Tinnitus Association 059-171-0133 www.anna.org  National [place] on Deafness and other Communication Disorders [phone] www.nidcd.nih.gov

## 2023-04-18 NOTE — PROGRESS NOTES
Subjective:       Patient ID: Edy Bartlett III is a 67 y.o. male.    Chief Complaint: No chief complaint on file.    HPI   Patient last seen 8 months ago for ear cleaning and vasomotor rhinitis. He returns today for ear check-up. Patient reports muffled hearing and suspects wax buildup. He would also like to know what can be done for his intense left-sided tinnitus.     Review of Systems   Constitutional: Negative.    HENT:  Positive for ear discharge, hearing loss, rhinorrhea and tinnitus.    Eyes: Negative.    Respiratory: Negative.     Cardiovascular: Negative.    Gastrointestinal: Negative.    Musculoskeletal: Negative.    Integumentary:  Negative.   Neurological: Negative.    Hematological: Negative.    Psychiatric/Behavioral: Negative.         Objective:      Physical Exam  Vitals and nursing note reviewed.   Constitutional:       General: He is not in acute distress.     Appearance: He is well-developed. He is not ill-appearing or diaphoretic.   HENT:      Head: Normocephalic and atraumatic.      Right Ear: Tympanic membrane, ear canal and external ear normal. Decreased hearing noted. No middle ear effusion. Tympanic membrane is not erythematous.      Left Ear: Ear canal and external ear normal. Decreased hearing noted. Tympanic membrane is scarred. Tympanic membrane is not erythematous.      Nose: Nose normal.      Mouth/Throat:      Mouth: No oral lesions.      Dentition: Abnormal dentition. Dental caries present.      Tongue: No lesions.      Palate: No lesions.      Pharynx: Uvula midline. No posterior oropharyngeal erythema.      Tonsils: No tonsillar exudate.   Eyes:      General: Lids are normal. No scleral icterus.        Right eye: No discharge.         Left eye: No discharge.   Neck:      Trachea: Trachea normal. No tracheal deviation.   Cardiovascular:      Rate and Rhythm: Normal rate.   Pulmonary:      Effort: Pulmonary effort is normal. No respiratory distress.      Breath sounds: No stridor.  No wheezing.   Musculoskeletal:         General: Normal range of motion.      Cervical back: Normal range of motion and neck supple.   Skin:     General: Skin is warm and dry.      Coloration: Skin is not pale.   Neurological:      Mental Status: He is alert and oriented to person, place, and time.      Coordination: Coordination normal.      Gait: Gait normal.   Psychiatric:         Speech: Speech normal.         Behavior: Behavior normal. Behavior is cooperative.         Thought Content: Thought content normal.         Judgment: Judgment normal.      SEPARATE PROCEDURE IN OFFICE:   Procedure: Removal of impacted cerumen, Bilateral   Pre Procedure Diagnosis: Cerumen Impaction   Post Procedure Diagnosis: Cerumen Impaction   Verbal informed consent in regards to risk of trauma to ear canal, ear drum or hearing, discomfort during procedure and/or inability to remove cerumen impaction in one session or unforeseen events or complications.   No anesthesia.     Procedure in detail:   Ear canal visualized bilateral with appropriate size ear speculum utilizing Operating Head Binocular Otomicroscope   Utilizing the following:  Ring curet, delicate alligator forceps, and/or suction cannula was used. The impacted cerumen of the ear canals was removed atraumatically. The TM and EAC were then inspected and found to be clear of wax. See description of TMs/EACs in PE above.   Complications: No   Condition: Improved/Good     Assessment:     Bilateral cerumen impactions removed    Left TM sclerosed/opaque  Tinnitus AU (AS>AD)  H/o bilateral SNHL   Plan:     Tinnitus handout given and discussed at length. Discussed elimination of exacerbating factors such as elevated blood pressure, high sodium intake, caffeine/stimulants, sleep deprivation/insomnia, certain medications, exposure to loud sounds, etc. Discussed white noise, hearing aids w/masking technology, and tinnitus retraining therapy (TRT). All questions answered.     Patient  encouraged to return to clinic if symptoms worsen/persist and as needed for further ENT symptoms or concerns.

## 2023-07-13 ENCOUNTER — TELEPHONE (OUTPATIENT)
Dept: OTOLARYNGOLOGY | Facility: CLINIC | Age: 68
End: 2023-07-13
Payer: MEDICARE

## 2023-07-13 NOTE — TELEPHONE ENCOUNTER
LVM for pt stating that the soonest appt is in Oct. And appt date and time has been given and added to wait list.

## 2023-07-13 NOTE — TELEPHONE ENCOUNTER
----- Message from Jose Banda sent at 7/13/2023  8:50 AM CDT -----  Contact: pt  Type:  Sooner Appointment Request    Caller is requesting a sooner appointment.  Caller declined first available appointment listed below.  Caller will not accept being placed on the waitlist and is requesting a message be sent to doctor.    Name of Caller:  pt   When is the first available appointment?  10/27  Symptoms:  ear blockage   Best Call Back Number:  [phone]    Additional Information:  pt would like a sooner appt he states he is hard of hearing if he doesn't answer would you all be able to call back. Please advise.

## 2023-07-20 DIAGNOSIS — I10 ESSENTIAL HYPERTENSION: ICD-10-CM

## 2023-07-20 RX ORDER — LISINOPRIL 10 MG/1
TABLET ORAL
Qty: 90 TABLET | Refills: 1 | Status: SHIPPED | OUTPATIENT
Start: 2023-07-20 | End: 2023-07-26 | Stop reason: SDUPTHER

## 2023-07-20 NOTE — TELEPHONE ENCOUNTER
No care due was identified.  Health [facility] Embedded Care Due Messages. Reference number: [id number].   7/20/2023 12:58:21 AM CDT

## 2023-07-20 NOTE — TELEPHONE ENCOUNTER
Refill Decision Note   Edy Bartlett  is requesting a refill authorization.  Brief Assessment and Rationale for Refill:  Approve     Medication Therapy Plan:         Comments:     Note composed:8:41 AM 07/20/2023             Appointments     Last Visit   1/26/2023 Huy Cordova MD   Next Visit   7/26/2023 Huy Cordova MD

## 2023-07-26 ENCOUNTER — TELEPHONE (OUTPATIENT)
Dept: FAMILY MEDICINE | Facility: CLINIC | Age: 68
End: 2023-07-26

## 2023-07-26 ENCOUNTER — LAB VISIT (OUTPATIENT)
Dept: LAB | Facility: HOSPITAL | Age: 68
End: 2023-07-26
Attending: INTERNAL MEDICINE
Payer: MEDICARE

## 2023-07-26 ENCOUNTER — OFFICE VISIT (OUTPATIENT)
Dept: FAMILY MEDICINE | Facility: CLINIC | Age: 68
End: 2023-07-26
Payer: MEDICARE

## 2023-07-26 VITALS
HEIGHT: 68 IN | BODY MASS INDEX: 28.2 KG/M2 | OXYGEN SATURATION: 95 % | SYSTOLIC BLOOD PRESSURE: 136 MMHG | HEART RATE: 79 BPM | DIASTOLIC BLOOD PRESSURE: 84 MMHG | WEIGHT: 186.06 LBS

## 2023-07-26 DIAGNOSIS — N13.8 BENIGN PROSTATIC HYPERPLASIA WITH URINARY OBSTRUCTION: ICD-10-CM

## 2023-07-26 DIAGNOSIS — R97.20 ELEVATED PSA, BETWEEN 10 AND LESS THAN 20 NG/ML: ICD-10-CM

## 2023-07-26 DIAGNOSIS — Z12.11 COLON CANCER SCREENING: ICD-10-CM

## 2023-07-26 DIAGNOSIS — E78.2 MIXED HYPERLIPIDEMIA: ICD-10-CM

## 2023-07-26 DIAGNOSIS — N40.1 BENIGN PROSTATIC HYPERPLASIA WITH URINARY OBSTRUCTION: ICD-10-CM

## 2023-07-26 DIAGNOSIS — Z00.00 WELLNESS EXAMINATION: Primary | ICD-10-CM

## 2023-07-26 DIAGNOSIS — I10 ESSENTIAL HYPERTENSION: ICD-10-CM

## 2023-07-26 DIAGNOSIS — R19.4 CHANGE IN BOWEL HABIT: ICD-10-CM

## 2023-07-26 DIAGNOSIS — Z86.73 HISTORY OF CVA (CEREBROVASCULAR ACCIDENT): ICD-10-CM

## 2023-07-26 LAB — COMPLEXED PSA SERPL-MCNC: 11.9 NG/ML (ref 0–4)

## 2023-07-26 PROCEDURE — 1159F MED LIST DOCD IN RCRD: CPT | Mod: CPTII,S$GLB,, | Performed by: INTERNAL MEDICINE

## 2023-07-26 PROCEDURE — 36415 COLL VENOUS BLD VENIPUNCTURE: CPT | Mod: PO | Performed by: INTERNAL MEDICINE

## 2023-07-26 PROCEDURE — 3079F PR MOST RECENT DIASTOLIC BLOOD PRESSURE 80-89 MM HG: ICD-10-PCS | Mod: CPTII,S$GLB,, | Performed by: INTERNAL MEDICINE

## 2023-07-26 PROCEDURE — 4010F PR ACE/ARB THEARPY RXD/TAKEN: ICD-10-PCS | Mod: CPTII,S$GLB,, | Performed by: INTERNAL MEDICINE

## 2023-07-26 PROCEDURE — 1101F PR PT FALLS ASSESS DOC 0-1 FALLS W/OUT INJ PAST YR: ICD-10-PCS | Mod: CPTII,S$GLB,, | Performed by: INTERNAL MEDICINE

## 2023-07-26 PROCEDURE — 99214 PR OFFICE/OUTPT VISIT, EST, LEVL IV, 30-39 MIN: ICD-10-PCS | Mod: S$GLB,,, | Performed by: INTERNAL MEDICINE

## 2023-07-26 PROCEDURE — 4010F ACE/ARB THERAPY RXD/TAKEN: CPT | Mod: CPTII,S$GLB,, | Performed by: INTERNAL MEDICINE

## 2023-07-26 PROCEDURE — 1160F RVW MEDS BY RX/DR IN RCRD: CPT | Mod: CPTII,S$GLB,, | Performed by: INTERNAL MEDICINE

## 2023-07-26 PROCEDURE — 99999 PR PBB SHADOW E&M-EST. PATIENT-LVL III: CPT | Mod: PBBFAC,,, | Performed by: INTERNAL MEDICINE

## 2023-07-26 PROCEDURE — 3008F PR BODY MASS INDEX (BMI) DOCUMENTED: ICD-10-PCS | Mod: CPTII,S$GLB,, | Performed by: INTERNAL MEDICINE

## 2023-07-26 PROCEDURE — 3008F BODY MASS INDEX DOCD: CPT | Mod: CPTII,S$GLB,, | Performed by: INTERNAL MEDICINE

## 2023-07-26 PROCEDURE — 3075F SYST BP GE 130 - 139MM HG: CPT | Mod: CPTII,S$GLB,, | Performed by: INTERNAL MEDICINE

## 2023-07-26 PROCEDURE — 3075F PR MOST RECENT SYSTOLIC BLOOD PRESS GE 130-139MM HG: ICD-10-PCS | Mod: CPTII,S$GLB,, | Performed by: INTERNAL MEDICINE

## 2023-07-26 PROCEDURE — 99214 OFFICE O/P EST MOD 30 MIN: CPT | Mod: S$GLB,,, | Performed by: INTERNAL MEDICINE

## 2023-07-26 PROCEDURE — 1101F PT FALLS ASSESS-DOCD LE1/YR: CPT | Mod: CPTII,S$GLB,, | Performed by: INTERNAL MEDICINE

## 2023-07-26 PROCEDURE — 3079F DIAST BP 80-89 MM HG: CPT | Mod: CPTII,S$GLB,, | Performed by: INTERNAL MEDICINE

## 2023-07-26 PROCEDURE — 1126F AMNT PAIN NOTED NONE PRSNT: CPT | Mod: CPTII,S$GLB,, | Performed by: INTERNAL MEDICINE

## 2023-07-26 PROCEDURE — 99999 PR PBB SHADOW E&M-EST. PATIENT-LVL III: ICD-10-PCS | Mod: PBBFAC,,, | Performed by: INTERNAL MEDICINE

## 2023-07-26 PROCEDURE — 1126F PR PAIN SEVERITY QUANTIFIED, NO PAIN PRESENT: ICD-10-PCS | Mod: CPTII,S$GLB,, | Performed by: INTERNAL MEDICINE

## 2023-07-26 PROCEDURE — 3288F FALL RISK ASSESSMENT DOCD: CPT | Mod: CPTII,S$GLB,, | Performed by: INTERNAL MEDICINE

## 2023-07-26 PROCEDURE — 1159F PR MEDICATION LIST DOCUMENTED IN MEDICAL RECORD: ICD-10-PCS | Mod: CPTII,S$GLB,, | Performed by: INTERNAL MEDICINE

## 2023-07-26 PROCEDURE — 1160F PR REVIEW ALL MEDS BY PRESCRIBER/CLIN PHARMACIST DOCUMENTED: ICD-10-PCS | Mod: CPTII,S$GLB,, | Performed by: INTERNAL MEDICINE

## 2023-07-26 PROCEDURE — 3288F PR FALLS RISK ASSESSMENT DOCUMENTED: ICD-10-PCS | Mod: CPTII,S$GLB,, | Performed by: INTERNAL MEDICINE

## 2023-07-26 PROCEDURE — 84153 ASSAY OF PSA TOTAL: CPT | Performed by: INTERNAL MEDICINE

## 2023-07-26 RX ORDER — ALIROCUMAB 75 MG/ML
75 INJECTION, SOLUTION SUBCUTANEOUS
Qty: 2 ML | Refills: 12 | Status: ON HOLD | OUTPATIENT
Start: 2023-07-26 | End: 2023-11-15 | Stop reason: HOSPADM

## 2023-07-26 RX ORDER — CLOPIDOGREL BISULFATE 75 MG/1
75 TABLET ORAL DAILY
Qty: 90 TABLET | Refills: 3 | Status: SHIPPED | OUTPATIENT
Start: 2023-07-26

## 2023-07-26 RX ORDER — ALIROCUMAB 75 MG/ML
75 INJECTION, SOLUTION SUBCUTANEOUS
Qty: 2 ML | Refills: 12 | Status: SHIPPED | OUTPATIENT
Start: 2023-07-26 | End: 2023-07-26 | Stop reason: SDUPTHER

## 2023-07-26 RX ORDER — LISINOPRIL 10 MG/1
10 TABLET ORAL DAILY
Qty: 90 TABLET | Refills: 3 | Status: SHIPPED | OUTPATIENT
Start: 2023-07-26

## 2023-07-26 NOTE — PROGRESS NOTES
"[facility]  [facility]   [address] [facility] [address]       Patient ID: Edy Bartlett III     Chief Complaint:   Chief Complaint   Patient presents with    Follow-up     6 month         HPI: Annual exam. Doing well overall. Needs colon cancer and we have decided to try to get a Colonoscopy.   Meds refilled.   Hypertension Controlled with med.   I do recommend he get the shingles shots at his pharmacy.   Labs from 1/2023 reviewed. PSA was elevated - will Recheck as he may need antibiotics or urology.   Still needs Carotid Ultrasound for Left carotid bruit.     Review of Systems       Negative     Objective:      Physical Exam   Physical Exam       Normal b except for Left carotid bruit    Vitals:   Vitals:    07/26/23 1405   BP: 136/84   Pulse: 79   SpO2: 95%   Weight: 84.4 kg (186 lb 1.1 oz)   Height: 5' 8" (1.727 m)        Assessment:           Plan:       Edy Bartlett III  was seen today for follow-up and may need lab work.    Diagnoses and all orders for this visit:    Edy was seen today for follow-up.    Diagnoses and all orders for this visit:    Wellness examination    Mixed hyperlipidemia  -     alirocumab (PRALUENT PEN) 75 mg/mL PnIj; Inject 1 mL (75 mg total) into the skin every 14 (fourteen) days.  Controlled with med     History of CVA (cerebrovascular accident)  -     clopidogreL (PLAVIX) 75 mg tablet; Take 1 tablet (75 mg total) by mouth once daily.  No significant residual deficits    Essential hypertension  -     lisinopriL 10 MG tablet; Take 1 tablet (10 mg total) by mouth once daily.  Controlled with med     Colon cancer screening  -     Case Request Endoscopy: COLONOSCOPY    Change in bowel habit  -     Case Request Endoscopy: COLONOSCOPY    Benign prostatic hyperplasia with urinary obstruction  Monitor     Elevated PSA, between 10 and less than 20 ng/ml  -     PROSTATE SPECIFIC ANTIGEN, DIAGNOSTIC; Future  Labs today          Huy Cordova MD    "

## 2023-08-01 ENCOUNTER — SPECIALTY PHARMACY (OUTPATIENT)
Dept: PHARMACY | Facility: CLINIC | Age: 68
End: 2023-08-01
Payer: MEDICARE

## 2023-08-01 ENCOUNTER — TELEPHONE (OUTPATIENT)
Dept: PHARMACY | Facility: CLINIC | Age: 68
End: 2023-08-01
Payer: MEDICARE

## 2023-08-01 NOTE — TELEPHONE ENCOUNTER
Donato, this is Lucero Jha, clinical pharmacist with [facility] that is part of your care team.  We have begun working on your prescription that your doctor has sent us. Our next steps include:     Working with your insurance company to obtain approval for your medication  Working with you to ensure your medication is affordable     We will be calling you along the way with updates on your medication but if you have any concerns or receive information that you would like to discuss please reach us at [phone].    Welcome call outcome: Patient/caregiver reached    Patient was confused about his insurance plan. 3 way patient with plan and had questions answered.

## 2023-08-01 NOTE — TELEPHONE ENCOUNTER
Specialty Pharmacy - Initial Clinical Assessment    Specialty Medication Orders Linked to Encounter      Flowsheet Row Most Recent Value   Medication #1 alirocumab (PRALUENT PEN) 75 mg/mL PnIj (Order#[id number], Rx#[id number])          Patient Diagnosis   E78.2 - Mixed hyperlipidemia  I63.542 - Cerebrovascular accident (CVA) due to occlusion of left cerebellar artery  Z86.73 - History of CVA (cerebrovascular accident)    Subjective    Edy Bartlett III is a 67 y.o. male, who is followed by the specialty pharmacy service for management and education.    Recent Encounters       Date Type Provider Description    08/01/2023 Specialty Pharmacy Lucero Jha, [place] Initial Clinical Assessment    08/01/2023 Specialty Pharmacy Lucero Jha PharmD Referral Authorization    02/20/2023 Specialty Pharmacy Lucero Jha, [place] Refill Coordination    01/26/2023 Specialty Pharmacy Any Myers Refill Coordination    12/27/2022 Specialty Pharmacy Viri Posada Refill Coordination            Current Outpatient Medications   Medication Sig    alirocumab (PRALUENT PEN) 75 mg/mL PnIj Inject 1 mL (75 mg total) into the skin every 14 (fourteen) days.    alirocumab (PRALUENT PEN) 75 mg/mL PnIj Inject 1 mL (75 mg total) into the skin every 14 (fourteen) days.    aspirin (ECOTRIN) 81 MG EC tablet TAKE 1 TABLET BY MOUTH EVERY DAY    clopidogreL (PLAVIX) 75 mg tablet Take 1 tablet (75 mg total) by mouth once daily.    ipratropium (ATROVENT) 42 mcg (0.06 %) nasal spray 2 sprays by Nasal route 3 (three) times daily. Use 2-3 times per day if necessary for chronic nasal drip    lisinopriL 10 MG tablet Take 1 tablet (10 mg total) by mouth once daily.   Last reviewed on 7/26/2023  2:21 PM by Huy Cordova MD    Review of patient's allergies indicates:   Allergen Reactions    Statins-hmg-coa reductase inhibitors Other (See Comments)     Not allergy, makes him feel awful   Last reviewed on  7/26/2023 2:21 PM by Huy BILLS  Tasha          Assessment Questions - Documented Responses      Flowsheet Row Most Recent Value   Assessment    All education points have been covered with patient? No, patient declined- printed education provided   Do you want to schedule first shipment? Yes   Medication #1 Assessment Info    Patient status Existing medication, Exisiting to OSP   Is this medication appropriate for the patient? Yes   Is this medication effective? Yes          Refill Questions - Documented Responses      Flowsheet Row Most Recent Value   Patient Availability and HIPAA Verification    Does patient want to proceed with activity? Yes   HIPAA/medical authority confirmed? Yes   Relationship to patient of person spoken to? Self   Refill Screening Questions    When does the patient need to receive the medication? 08/01/23   Refill Delivery Questions    How will the patient receive the medication? MEDRx   When does the patient need to receive the medication? 08/01/23   Shipping Address Home   Address in [facility] confirmed and updated if neccessary? Yes   Expected Copay ($) 20   Is the patient able to afford the medication copay? Yes   Payment Method CC on file   Days supply of Refill 28   Supplies needed? No supplies needed   Refill activity completed? Yes   Refill activity plan Refill scheduled   Shipment/Pickup Date: 08/01/23            Objective    He has a past medical history of BPH (benign prostatic hypertrophy) with urinary obstruction, Cerebellar stroke, Complication of anesthesia, DDD (degenerative disc disease), DDD (degenerative disc disease), cervical, DDD (degenerative disc disease), lumbar, DJD (degenerative joint disease) of knee (12/10/2012), ED (erectile dysfunction), Elevated PSA, Hyperlipidemia, Hypertension, Mass of skin (2012), Mobility impaired, Neuropathy, OA (osteoarthritis of spine), and Throat cancer.    Tried/failed medications:     BP Readings from Last 4 Encounters:   07/26/23 136/84   01/26/23 118/80  "  07/29/22 122/78   04/14/22 122/80     Ht Readings from Last 4 Encounters:   07/26/23 5' 8" (1.727 m)   04/18/23 5' 8" (1.727 m)   01/26/23 5' 8" (1.727 m)   01/23/23 5' 8" (1.727 m)     Wt Readings from Last 4 Encounters:   07/26/23 84.4 kg (186 lb 1.1 oz)   04/18/23 84.4 kg (186 lb 1.1 oz)   01/26/23 80.1 kg (176 lb 9.4 oz)   01/23/23 82.1 kg (181 lb)       The goals of prescribed drug therapy management include:  Supporting patient to meet the prescriber's medical treatment objectives  Improving or maintaining quality of life  Maintaining optimal therapy adherence  Minimizing and managing side effects           Assessment/Plan  Patient plans to start therapy on 08/01/23      Indication, dosage, appropriateness, effectiveness, safety and convenience of his specialty medication(s) were reviewed today.     Patient Education   Pharmacist offer to  patient was declined. Printed educational materials will be provided with medication.    Tasks added this encounter   No tasks added.   Tasks due within next 3 months   No tasks due.     Lucero Jha, PharmD  [facility]  [address] [facility] [address]  Phone: [phone]  Fax: [phone]  "

## 2023-08-02 ENCOUNTER — HOSPITAL ENCOUNTER (OUTPATIENT)
Dept: RADIOLOGY | Facility: HOSPITAL | Age: 68
Discharge: HOME | End: 2023-08-02
Attending: INTERNAL MEDICINE
Payer: MEDICARE

## 2023-08-02 DIAGNOSIS — Z86.73 HISTORY OF CVA (CEREBROVASCULAR ACCIDENT): ICD-10-CM

## 2023-08-02 DIAGNOSIS — I67.2 CEREBRAL ATHEROSCLEROSIS: ICD-10-CM

## 2023-08-02 PROCEDURE — 93880 EXTRACRANIAL BILAT STUDY: CPT | Mod: 26,,, | Performed by: RADIOLOGY

## 2023-08-02 PROCEDURE — 93880 EXTRACRANIAL BILAT STUDY: CPT | Mod: TC,PO

## 2023-08-02 PROCEDURE — 93880 US CAROTID BILATERAL: ICD-10-PCS | Mod: 26,,, | Performed by: RADIOLOGY

## 2023-08-06 DIAGNOSIS — I65.29 OCCLUSION AND STENOSIS OF UNSPECIFIED CAROTID ARTERY: Primary | ICD-10-CM

## 2023-08-11 DIAGNOSIS — M25.562 ACUTE PAIN OF LEFT KNEE: Primary | ICD-10-CM

## 2023-08-14 ENCOUNTER — HOSPITAL ENCOUNTER (OUTPATIENT)
Dept: RADIOLOGY | Facility: HOSPITAL | Age: 68
Discharge: HOME | End: 2023-08-14
Attending: ORTHOPAEDIC SURGERY
Payer: MEDICARE

## 2023-08-14 ENCOUNTER — OFFICE VISIT (OUTPATIENT)
Dept: ORTHOPEDICS | Facility: CLINIC | Age: 68
End: 2023-08-14
Payer: MEDICARE

## 2023-08-14 VITALS — WEIGHT: 186 LBS | HEIGHT: 68 IN | BODY MASS INDEX: 28.19 KG/M2

## 2023-08-14 DIAGNOSIS — M12.811 ROTATOR CUFF ARTHROPATHY OF RIGHT SHOULDER: ICD-10-CM

## 2023-08-14 DIAGNOSIS — M25.562 ACUTE PAIN OF LEFT KNEE: ICD-10-CM

## 2023-08-14 DIAGNOSIS — M25.562 BILATERAL KNEE PAIN: ICD-10-CM

## 2023-08-14 DIAGNOSIS — M17.11 OSTEOARTHRITIS OF RIGHT KNEE: ICD-10-CM

## 2023-08-14 DIAGNOSIS — M25.511 RIGHT SHOULDER PAIN: ICD-10-CM

## 2023-08-14 DIAGNOSIS — M25.561 RIGHT KNEE PAIN: Primary | ICD-10-CM

## 2023-08-14 DIAGNOSIS — M25.561 BILATERAL KNEE PAIN: ICD-10-CM

## 2023-08-14 PROCEDURE — 99999 PR PBB SHADOW E&M-EST. PATIENT-LVL III: CPT | Mod: PBBFAC,,, | Performed by: ORTHOPAEDIC SURGERY

## 2023-08-14 PROCEDURE — 20610 DRAIN/INJ JOINT/BURSA W/O US: CPT | Mod: RT,S$GLB,, | Performed by: ORTHOPAEDIC SURGERY

## 2023-08-14 PROCEDURE — 3008F PR BODY MASS INDEX (BMI) DOCUMENTED: ICD-10-PCS | Mod: CPTII,S$GLB,, | Performed by: ORTHOPAEDIC SURGERY

## 2023-08-14 PROCEDURE — 1101F PT FALLS ASSESS-DOCD LE1/YR: CPT | Mod: CPTII,S$GLB,, | Performed by: ORTHOPAEDIC SURGERY

## 2023-08-14 PROCEDURE — 1125F PR PAIN SEVERITY QUANTIFIED, PAIN PRESENT: ICD-10-PCS | Mod: CPTII,S$GLB,, | Performed by: ORTHOPAEDIC SURGERY

## 2023-08-14 PROCEDURE — 4010F PR ACE/ARB THEARPY RXD/TAKEN: ICD-10-PCS | Mod: CPTII,S$GLB,, | Performed by: ORTHOPAEDIC SURGERY

## 2023-08-14 PROCEDURE — 99999 PR PBB SHADOW E&M-EST. PATIENT-LVL III: ICD-10-PCS | Mod: PBBFAC,,, | Performed by: ORTHOPAEDIC SURGERY

## 2023-08-14 PROCEDURE — 1159F MED LIST DOCD IN RCRD: CPT | Mod: CPTII,S$GLB,, | Performed by: ORTHOPAEDIC SURGERY

## 2023-08-14 PROCEDURE — 99214 OFFICE O/P EST MOD 30 MIN: CPT | Mod: 25,S$GLB,, | Performed by: ORTHOPAEDIC SURGERY

## 2023-08-14 PROCEDURE — 3008F BODY MASS INDEX DOCD: CPT | Mod: CPTII,S$GLB,, | Performed by: ORTHOPAEDIC SURGERY

## 2023-08-14 PROCEDURE — 73562 X-RAY EXAM OF KNEE 3: CPT | Mod: TC,50,PO

## 2023-08-14 PROCEDURE — 73562 X-RAY EXAM OF KNEE 3: CPT | Mod: 26,50,, | Performed by: RADIOLOGY

## 2023-08-14 PROCEDURE — 73562 XR KNEE ORTHO BILAT: ICD-10-PCS | Mod: 26,50,, | Performed by: RADIOLOGY

## 2023-08-14 PROCEDURE — 1159F PR MEDICATION LIST DOCUMENTED IN MEDICAL RECORD: ICD-10-PCS | Mod: CPTII,S$GLB,, | Performed by: ORTHOPAEDIC SURGERY

## 2023-08-14 PROCEDURE — 4010F ACE/ARB THERAPY RXD/TAKEN: CPT | Mod: CPTII,S$GLB,, | Performed by: ORTHOPAEDIC SURGERY

## 2023-08-14 PROCEDURE — 99214 PR OFFICE/OUTPT VISIT, EST, LEVL IV, 30-39 MIN: ICD-10-PCS | Mod: 25,S$GLB,, | Performed by: ORTHOPAEDIC SURGERY

## 2023-08-14 PROCEDURE — 1101F PR PT FALLS ASSESS DOC 0-1 FALLS W/OUT INJ PAST YR: ICD-10-PCS | Mod: CPTII,S$GLB,, | Performed by: ORTHOPAEDIC SURGERY

## 2023-08-14 PROCEDURE — 3288F PR FALLS RISK ASSESSMENT DOCUMENTED: ICD-10-PCS | Mod: CPTII,S$GLB,, | Performed by: ORTHOPAEDIC SURGERY

## 2023-08-14 PROCEDURE — 20610 LARGE JOINT ASPIRATION/INJECTION: R SUBACROMIAL BURSA: ICD-10-PCS | Mod: RT,S$GLB,, | Performed by: ORTHOPAEDIC SURGERY

## 2023-08-14 PROCEDURE — 1125F AMNT PAIN NOTED PAIN PRSNT: CPT | Mod: CPTII,S$GLB,, | Performed by: ORTHOPAEDIC SURGERY

## 2023-08-14 PROCEDURE — 3288F FALL RISK ASSESSMENT DOCD: CPT | Mod: CPTII,S$GLB,, | Performed by: ORTHOPAEDIC SURGERY

## 2023-08-14 RX ORDER — TRIAMCINOLONE ACETONIDE 40 MG/ML
40 INJECTION, SUSPENSION INTRA-ARTICULAR; INTRAMUSCULAR
Status: DISCONTINUED | OUTPATIENT
Start: 2023-08-14 | End: 2023-08-14 | Stop reason: HOSPADM

## 2023-08-14 RX ORDER — TRIAMCINOLONE ACETONIDE 40 MG/ML
80 INJECTION, SUSPENSION INTRA-ARTICULAR; INTRAMUSCULAR
Status: DISCONTINUED | OUTPATIENT
Start: 2023-08-14 | End: 2023-08-14 | Stop reason: HOSPADM

## 2023-08-14 RX ADMIN — TRIAMCINOLONE ACETONIDE 80 MG: 40 INJECTION, SUSPENSION INTRA-ARTICULAR; INTRAMUSCULAR at 09:08

## 2023-08-14 RX ADMIN — TRIAMCINOLONE ACETONIDE 40 MG: 40 INJECTION, SUSPENSION INTRA-ARTICULAR; INTRAMUSCULAR at 09:08

## 2023-08-14 NOTE — PROCEDURES
Large Joint Aspiration/Injection: R subacromial bursa    Date/Time: 8/14/2023 9:00 AM    Performed by: Nelsno Silva MD  Authorized by: Nelson Silva MD    Consent Done?:  Yes (Verbal)  Site marked: the procedure site was marked    Prep: patient was prepped and draped in usual sterile fashion      Details:  Needle Size:  21 G  Approach:  Posterior  Location:  Shoulder  Site:  R subacromial bursa  Medications:  80 mg triamcinolone acetonide 40 mg/mL  Patient tolerance:  Patient tolerated the procedure well with no immediate complications

## 2023-08-14 NOTE — PROCEDURES
Large Joint Aspiration/Injection: R knee    Date/Time: 8/14/2023 9:00 AM    Performed by: Nelson Silva MD  Authorized by: Nelson Silva MD    Consent Done?:  Yes (Verbal)  Timeout: prior to procedure the correct patient, procedure, and site was verified    Prep: patient was prepped and draped in usual sterile fashion      Details:  Needle Size:  21 G  Approach:  Anterolateral  Location:  Knee  Site:  R knee  Medications:  40 mg triamcinolone acetonide 40 mg/mL  Patient tolerance:  Patient tolerated the procedure well with no immediate complications

## 2023-08-14 NOTE — PROGRESS NOTES
67 years old right shoulder and right knee pain.  Worse with activity relieved with rest.  We would given him a Kenalog injection to his right knee about 7 months ago with good relief also had given him injection into his right shoulder year and a half ago with good relief having recurrence of symptoms requesting some form relief    Exam shows no outward signs of injury or infection, tenderness the joint line of the knee no instability skin is intact compartments are soft, weak and painful cuff strength of the right shoulder no instability or signs infection    X-rays of the knee show well placed partial knee replacement on the left knee, mild degenerative changes right knee    Assessment:  Right knee arthrosis, right shoulder rotator cuff arthropathy     Plan: Kenalog injection right shoulder, Kenalog injection right knee, encourage strengthening over time, follow-up as needed

## 2023-08-24 ENCOUNTER — TELEPHONE (OUTPATIENT)
Dept: GASTROENTEROLOGY | Facility: CLINIC | Age: 68
End: 2023-08-24
Payer: MEDICARE

## 2023-09-09 ENCOUNTER — TELEPHONE (OUTPATIENT)
Dept: FAMILY MEDICINE | Facility: CLINIC | Age: 68
End: 2023-09-09
Payer: MEDICARE

## 2023-09-09 NOTE — TELEPHONE ENCOUNTER
----- Message from Huy Cordova MD sent at 9/6/2023  1:59 PM CDT -----  Let's get the CTA of his carotids ASAP and I'll pick someone for him to see if needed.

## 2023-09-14 DIAGNOSIS — I65.29 OCCLUSION AND STENOSIS OF UNSPECIFIED CAROTID ARTERY: Primary | ICD-10-CM

## 2023-09-15 ENCOUNTER — HOSPITAL ENCOUNTER (OUTPATIENT)
Dept: RADIOLOGY | Facility: HOSPITAL | Age: 68
Discharge: HOME | End: 2023-09-15
Attending: INTERNAL MEDICINE
Payer: MEDICARE

## 2023-09-15 DIAGNOSIS — I65.29 OCCLUSION AND STENOSIS OF UNSPECIFIED CAROTID ARTERY: ICD-10-CM

## 2023-09-15 PROCEDURE — 70498 CT ANGIOGRAPHY NECK: CPT | Mod: 26,,, | Performed by: RADIOLOGY

## 2023-09-15 PROCEDURE — 70498 CTA NECK: ICD-10-PCS | Mod: 26,,, | Performed by: RADIOLOGY

## 2023-09-15 PROCEDURE — 70498 CT ANGIOGRAPHY NECK: CPT | Mod: TC,PO

## 2023-09-15 PROCEDURE — 25500020 PHARM REV CODE 255: Mod: PO | Performed by: INTERNAL MEDICINE

## 2023-09-15 RX ADMIN — IOHEXOL 100 ML: 350 INJECTION, SOLUTION INTRAVENOUS at 04:09

## 2023-09-19 ENCOUNTER — TELEPHONE (OUTPATIENT)
Dept: FAMILY MEDICINE | Facility: CLINIC | Age: 68
End: 2023-09-19
Payer: MEDICARE

## 2023-09-19 DIAGNOSIS — I65.29 OCCLUSION AND STENOSIS OF UNSPECIFIED CAROTID ARTERY: Primary | ICD-10-CM

## 2023-09-19 NOTE — TELEPHONE ENCOUNTER
Referral placed to Dr. Howard Dumont for carotid stenosis. Please fax referral today and give Mr. Snow his number.

## 2023-09-19 NOTE — TELEPHONE ENCOUNTER
----- Message from Chrissie Miller sent at 9/19/2023  9:18 AM CDT -----  Type:  Appointment Request    Caller is requesting an appointment.      Name of Caller:  Pt    Would the patient rather a call back or a response via [facility]?  Call back    Best Call Back Number:  [phone]    Additional Information:  Pt is wanting Dr Cordova to set him up with a cardiologist or ct surgeon. He is would like someone to call him back.   Please call back to advise. Thanks!

## 2023-09-19 NOTE — TELEPHONE ENCOUNTER
Called pt and spoke to him per pcp.    Got referral faxed. Pt doesn't have pen and paper handy. Sent [employer] message with information

## 2023-09-21 ENCOUNTER — TELEPHONE (OUTPATIENT)
Dept: FAMILY MEDICINE | Facility: CLINIC | Age: 68
End: 2023-09-21
Payer: MEDICARE

## 2023-09-21 NOTE — TELEPHONE ENCOUNTER
----- Message from Francesca Cunningham sent at 9/20/2023  9:39 AM CDT -----  Contact: Fred  Type: Needs Medical Advice  Who Called:  Fred Barbour Call Back Number: [phone]  Additional Information: Fred is calling the office needs referral sent over to there office for  fax # [phone].Please call back and advise.

## 2023-09-22 ENCOUNTER — PATIENT MESSAGE (OUTPATIENT)
Dept: PHARMACY | Facility: CLINIC | Age: 68
End: 2023-09-22
Payer: MEDICARE

## 2023-09-26 ENCOUNTER — TELEPHONE (OUTPATIENT)
Dept: GASTROENTEROLOGY | Facility: CLINIC | Age: 68
End: 2023-09-26
Payer: MEDICARE

## 2023-09-26 NOTE — TELEPHONE ENCOUNTER
""I don't do test anymore. Let me do this artery thing."  Pt declines scheduling C-scope in case he dies after seeing his Cardiologist for the artery thing (blockage). Case is cancelled.   "

## 2023-10-03 ENCOUNTER — TELEPHONE (OUTPATIENT)
Dept: FAMILY MEDICINE | Facility: CLINIC | Age: 68
End: 2023-10-03
Payer: MEDICARE

## 2023-10-03 NOTE — TELEPHONE ENCOUNTER
----- Message from Elvia Sal, Patient Care Assistant sent at 10/3/2023 12:12 PM CDT -----  Regarding: advice  Contact: Fred with Dr. Dumont  Type: Needs Medical Advice    Who Called:  Fred with Dr. Dumont    Best Call Back Number:  fax      Additional Information: Fred with Dr. Dumont states she would like a callback regarding requesting last clinical notes and any cardio test for the pt. Please call to advise. Thanks!

## 2023-10-03 NOTE — TELEPHONE ENCOUNTER
Spoke with Fred at Dr. Dumont office in regards to patient referral. Fred is needing Dr. Cordova's last 2 progress notes and the test reports from the CTA of the neck and Carotid US. I have faxed over everything needed to .

## 2023-10-04 ENCOUNTER — TELEPHONE (OUTPATIENT)
Dept: FAMILY MEDICINE | Facility: CLINIC | Age: 68
End: 2023-10-04
Payer: MEDICARE

## 2023-10-04 NOTE — TELEPHONE ENCOUNTER
----- Message from Laisha Rodriguez sent at 10/4/2023 10:29 AM CDT -----  Regarding: advice  Contact: Ale Martinez with Dr Howard martinez  Type: Needs Medical Advice  Who Called:  Ale with Dr Howard Martinez's office  Symptoms (please be specific):    How long has patient had these symptoms:    Pharmacy name and phone #:    Best Call Back Number: [phone]  Additional Information: Ale received a referral for patient and has scheduled him for 10/05/23. She needs last office visit. Please fax to [phone]. Please call Ale if any questions. Thanks!

## 2023-10-04 NOTE — TELEPHONE ENCOUNTER
Spoke with Ale at Dr. Dumont office in regards to the patient office visit. Ale informed me that they have received everything that was faxed over.

## 2023-10-17 ENCOUNTER — TELEPHONE (OUTPATIENT)
Dept: FAMILY MEDICINE | Facility: CLINIC | Age: 68
End: 2023-10-17
Payer: MEDICARE

## 2023-10-17 DIAGNOSIS — I65.29 OCCLUSION AND STENOSIS OF UNSPECIFIED CAROTID ARTERY: Primary | ICD-10-CM

## 2023-10-17 NOTE — TELEPHONE ENCOUNTER
Referred to Dr. Gaffney with CT surgery. Please make sure her office gets the referral and let Patient know.

## 2023-10-17 NOTE — TELEPHONE ENCOUNTER
Spoke with patient and informed him that Dr. Cordova has placed a referral to Dr. Mcdonald with CT surgery. Referral has been faxed over, and patient was given the phone number to follow up.

## 2023-10-17 NOTE — TELEPHONE ENCOUNTER
Spoke with patient in regards to his appointment with Dr. Dumont. Patient stated the doctor didn't do anything for his stenosis and is wanting to be referred to another provider.

## 2023-10-17 NOTE — TELEPHONE ENCOUNTER
----- Message from Concha Carson sent at 10/17/2023  8:48 AM CDT -----  Contact: self  Type:  Needs Medical Advice    Who Called: self  Symptoms (please be specific): pt wants to speak to nurse concerning a stint. Sts someone was suppose to call but never did.  Would the patient rather a call back or a response via [facility]? call  Best Call Back Number: [phone] (home)     Additional Information: please advise and thank you.

## 2023-10-27 ENCOUNTER — TELEPHONE (OUTPATIENT)
Dept: VASCULAR SURGERY | Facility: CLINIC | Age: 68
End: 2023-10-27
Payer: MEDICARE

## 2023-10-27 ENCOUNTER — OFFICE VISIT (OUTPATIENT)
Dept: OTOLARYNGOLOGY | Facility: CLINIC | Age: 68
End: 2023-10-27
Payer: MEDICARE

## 2023-10-27 VITALS — WEIGHT: 177.5 LBS | BODY MASS INDEX: 26.9 KG/M2 | HEIGHT: 68 IN

## 2023-10-27 DIAGNOSIS — H90.A32 MIXED CONDUCTIVE AND SENSORINEURAL HEARING LOSS OF LEFT EAR WITH RESTRICTED HEARING OF RIGHT EAR: ICD-10-CM

## 2023-10-27 DIAGNOSIS — H74.02 TS (TYMPANOSCLEROSIS), LEFT: ICD-10-CM

## 2023-10-27 DIAGNOSIS — H93.13 TINNITUS, BILATERAL: ICD-10-CM

## 2023-10-27 DIAGNOSIS — H90.5 HEARING LOSS, SENSORINEURAL, HIGH FREQUENCY, RIGHT: ICD-10-CM

## 2023-10-27 DIAGNOSIS — H61.23 BILATERAL IMPACTED CERUMEN: ICD-10-CM

## 2023-10-27 DIAGNOSIS — T66.XXXS LATE EFFECT OF RADIATION: Primary | ICD-10-CM

## 2023-10-27 PROCEDURE — 1126F AMNT PAIN NOTED NONE PRSNT: CPT | Mod: CPTII,S$GLB,, | Performed by: NURSE PRACTITIONER

## 2023-10-27 PROCEDURE — 99999 PR PBB SHADOW E&M-EST. PATIENT-LVL III: ICD-10-PCS | Mod: PBBFAC,,, | Performed by: NURSE PRACTITIONER

## 2023-10-27 PROCEDURE — 4010F PR ACE/ARB THEARPY RXD/TAKEN: ICD-10-PCS | Mod: CPTII,S$GLB,, | Performed by: NURSE PRACTITIONER

## 2023-10-27 PROCEDURE — 3008F BODY MASS INDEX DOCD: CPT | Mod: CPTII,S$GLB,, | Performed by: NURSE PRACTITIONER

## 2023-10-27 PROCEDURE — 1159F PR MEDICATION LIST DOCUMENTED IN MEDICAL RECORD: ICD-10-PCS | Mod: CPTII,S$GLB,, | Performed by: NURSE PRACTITIONER

## 2023-10-27 PROCEDURE — 3008F PR BODY MASS INDEX (BMI) DOCUMENTED: ICD-10-PCS | Mod: CPTII,S$GLB,, | Performed by: NURSE PRACTITIONER

## 2023-10-27 PROCEDURE — 1126F PR PAIN SEVERITY QUANTIFIED, NO PAIN PRESENT: ICD-10-PCS | Mod: CPTII,S$GLB,, | Performed by: NURSE PRACTITIONER

## 2023-10-27 PROCEDURE — 99214 OFFICE O/P EST MOD 30 MIN: CPT | Mod: 25,S$GLB,, | Performed by: NURSE PRACTITIONER

## 2023-10-27 PROCEDURE — 3288F FALL RISK ASSESSMENT DOCD: CPT | Mod: CPTII,S$GLB,, | Performed by: NURSE PRACTITIONER

## 2023-10-27 PROCEDURE — 1101F PR PT FALLS ASSESS DOC 0-1 FALLS W/OUT INJ PAST YR: ICD-10-PCS | Mod: CPTII,S$GLB,, | Performed by: NURSE PRACTITIONER

## 2023-10-27 PROCEDURE — 69210 PR REMOVAL IMPACTED CERUMEN REQUIRING INSTRUMENTATION, UNILATERAL: ICD-10-PCS | Mod: S$GLB,,, | Performed by: NURSE PRACTITIONER

## 2023-10-27 PROCEDURE — 1159F MED LIST DOCD IN RCRD: CPT | Mod: CPTII,S$GLB,, | Performed by: NURSE PRACTITIONER

## 2023-10-27 PROCEDURE — 3288F PR FALLS RISK ASSESSMENT DOCUMENTED: ICD-10-PCS | Mod: CPTII,S$GLB,, | Performed by: NURSE PRACTITIONER

## 2023-10-27 PROCEDURE — 99999 PR PBB SHADOW E&M-EST. PATIENT-LVL III: CPT | Mod: PBBFAC,,, | Performed by: NURSE PRACTITIONER

## 2023-10-27 PROCEDURE — 1101F PT FALLS ASSESS-DOCD LE1/YR: CPT | Mod: CPTII,S$GLB,, | Performed by: NURSE PRACTITIONER

## 2023-10-27 PROCEDURE — 99214 PR OFFICE/OUTPT VISIT, EST, LEVL IV, 30-39 MIN: ICD-10-PCS | Mod: 25,S$GLB,, | Performed by: NURSE PRACTITIONER

## 2023-10-27 PROCEDURE — 4010F ACE/ARB THERAPY RXD/TAKEN: CPT | Mod: CPTII,S$GLB,, | Performed by: NURSE PRACTITIONER

## 2023-10-27 PROCEDURE — 69210 REMOVE IMPACTED EAR WAX UNI: CPT | Mod: S$GLB,,, | Performed by: NURSE PRACTITIONER

## 2023-10-27 RX ORDER — MUPIROCIN 20 MG/G
OINTMENT TOPICAL
Status: ON HOLD | COMMUNITY
Start: 2023-08-28 | End: 2023-11-15 | Stop reason: HOSPADM

## 2023-10-27 RX ORDER — CIPROFLOXACIN AND DEXAMETHASONE 3; 1 MG/ML; MG/ML
SUSPENSION/ DROPS AURICULAR (OTIC)
Status: ON HOLD | COMMUNITY
Start: 2023-09-15 | End: 2023-11-15 | Stop reason: HOSPADM

## 2023-10-27 NOTE — TELEPHONE ENCOUNTER
Appt scheduled for 11/2 at [place] location   ----- Message from Hilaria Bhatti sent at 10/27/2023  4:54 PM CDT -----  Contact: self  Type:  Sooner Appointment Request    Caller is requesting a sooner appointment.  Caller declined first available appointment listed below.  Caller will not accept being placed on the waitlist and is requesting a message be sent to doctor.    Name of Caller:  pt  When is the first available appointment?  N/a  Symptoms:  viens in neck  Would the patient rather a call back or a [facility]? call  Best Call Back Number:  [phone]   Additional Information:  please call pt has referral

## 2023-10-27 NOTE — PATIENT INSTRUCTIONS
Tinnitus (Ringing in the Ears)  Tinnitus is the term for a noise in your ear not caused by an outside sound. The noise might be a ringing, buzzing, hissing, roaring. It can vary in pitch and may be soft or quite loud. For some people, tinnitus is a minor nuisance. But for others, the noise can make it hard to hear, work, and even sleep. When tinnitus can't be cured, a number of treatments may offer relief.  What causes tinnitus?  Loud noises, hearing loss, and ear wax can cause tinnitus. So can certain medicines. Large amounts of aspirin or caffeine are sometimes to blame. In many cases, the exact cause of tinnitus is unknown.  How is tinnitus treated?  Identifying and removing the cause is the best way to treat tinnitus. For that reason, your healthcare provider may refer you to an otolaryngologist (ear, nose, and throat doctor). Your hearing may also be checked by an audiologist (hearing specialist). If you have hearing loss, wearing a hearing aid may help your tinnitus. When the cause can't be found, the tinnitus itself may be treated. Some of the treatments are listed below, and your healthcare provider can tell you more about them:  Avoid exposure to loud sounds, which will exacerbate tinnitus.  Wear ear protection around loud noises.  Get your blood pressure check regularly.  If it is high, see your doctor.  Check with your PCP whether labs can be done to check for anemia and hyperthyroid, as these can worsen tinnitus.   Decrease salt intake.  Avoid stimulants such as caffeine and tobacco.  Exercise daily to improve circulation. Poor circulation worsens tinnitus.   Avoid sleep deprivation. Sleep deprivation and insomnia can worsen tinnitus. Get adequate rest.  Reduce aspirin use, if possible. Aspirin as well as NSAIDs and narcotic pain relievers can exacerbate tinnitus.   Stop worrying about the noise.  Stress worsens tinnitus. Recognize the noise as an annoyance and learn to ignore it as much as possible.  Patients with higher rates of anxiety, depression, concentration, or problems sleeping may need to discuss taking something for anxiety or depression with their primary care provider.     Consider a trial of lipoflavonoids, slow-release niacin, or Arches' Tinnitus Formula (over-the-counter).  Purchase a white noise machine to mask tinnitus.  The "[employer]" Tinnitus Relief joe on your smart phone uses a combination of sounds and relaxing exercises that aim to distract your brain from focusing on tinnitus. Over time the brain learns to focus less on the tinnitus.   When no underlying pathology can be identified, an audiogram is needed to screen for hearing loss. If hearing loss is noted, hearing aids with masking technology are recommended to help relieve tinnitus.   Maskers are small devices that look like hearing aids. They emit a pleasant sound that helps cover up the ringing in your ears, similar to the technology used in noise-cancelling headphones. Hearing aids and maskers are sometimes used together.  Cognitive Behavioral Therapy (CBT), otherwise known as Tinnitus Retraining Therapy (TRT), can be done by either an audiologist or a cognitive behavioral therapist who is certified in TRT. Tinnitus retraining therapy combines biofeedback, counseling and maskers.   Medicines that treat anxiety and depression may ease tinnitus in some people.  Hypnosis or relaxation therapy may help noise seem less severe.    First-line treatment for tinnitus:  Elimination of exacerbating factors such as elevated blood pressure, high sodium intake, caffeine/stimulants, sleep deprivation/insomnia, certain medications, aspirin, exposure to loud sounds, etc. White noise is recommended as first-line treatment.    Second-line treatment for tinnitus:  Maskers (with or without the use of a hearing aid depending on the outcome of your hearing test) and/or Cognitive Behavior Therapy (Tinnitus Retraining Therapy).  To find an audiologist or  Cognitive Behavioral Therapist in your area who is certified in Tinnitus Retraining Therapy, you must contact the American Tinnitus Association at 1-922.331.7974 or www.anna.org.    For more information  American Speech-Hearing-Language Association [phone] www.jen.org  American Tinnitus Association [phone] www.anna.org  National [place] on Deafness and other Communication Disorders [phone] www.nidcd.nih.gov
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
## 2023-11-02 ENCOUNTER — OFFICE VISIT (OUTPATIENT)
Dept: VASCULAR SURGERY | Facility: CLINIC | Age: 68
End: 2023-11-02
Payer: MEDICARE

## 2023-11-02 VITALS
HEIGHT: 68 IN | DIASTOLIC BLOOD PRESSURE: 85 MMHG | WEIGHT: 176.56 LBS | SYSTOLIC BLOOD PRESSURE: 127 MMHG | HEART RATE: 91 BPM | BODY MASS INDEX: 26.76 KG/M2

## 2023-11-02 DIAGNOSIS — I65.23 BILATERAL CAROTID ARTERY STENOSIS: Primary | ICD-10-CM

## 2023-11-02 DIAGNOSIS — I65.29 CAROTID STENOSIS: ICD-10-CM

## 2023-11-02 DIAGNOSIS — I65.22 CAROTID STENOSIS, ASYMPTOMATIC, LEFT: Primary | ICD-10-CM

## 2023-11-02 PROCEDURE — 4010F ACE/ARB THERAPY RXD/TAKEN: CPT | Mod: CPTII,S$GLB,, | Performed by: THORACIC SURGERY (CARDIOTHORACIC VASCULAR SURGERY)

## 2023-11-02 PROCEDURE — 1159F MED LIST DOCD IN RCRD: CPT | Mod: CPTII,S$GLB,, | Performed by: THORACIC SURGERY (CARDIOTHORACIC VASCULAR SURGERY)

## 2023-11-02 PROCEDURE — 3074F PR MOST RECENT SYSTOLIC BLOOD PRESSURE < 130 MM HG: ICD-10-PCS | Mod: CPTII,S$GLB,, | Performed by: THORACIC SURGERY (CARDIOTHORACIC VASCULAR SURGERY)

## 2023-11-02 PROCEDURE — 1159F PR MEDICATION LIST DOCUMENTED IN MEDICAL RECORD: ICD-10-PCS | Mod: CPTII,S$GLB,, | Performed by: THORACIC SURGERY (CARDIOTHORACIC VASCULAR SURGERY)

## 2023-11-02 PROCEDURE — 3074F SYST BP LT 130 MM HG: CPT | Mod: CPTII,S$GLB,, | Performed by: THORACIC SURGERY (CARDIOTHORACIC VASCULAR SURGERY)

## 2023-11-02 PROCEDURE — 1126F AMNT PAIN NOTED NONE PRSNT: CPT | Mod: CPTII,S$GLB,, | Performed by: THORACIC SURGERY (CARDIOTHORACIC VASCULAR SURGERY)

## 2023-11-02 PROCEDURE — 99204 PR OFFICE/OUTPT VISIT, NEW, LEVL IV, 45-59 MIN: ICD-10-PCS | Mod: S$GLB,,, | Performed by: THORACIC SURGERY (CARDIOTHORACIC VASCULAR SURGERY)

## 2023-11-02 PROCEDURE — 3008F BODY MASS INDEX DOCD: CPT | Mod: CPTII,S$GLB,, | Performed by: THORACIC SURGERY (CARDIOTHORACIC VASCULAR SURGERY)

## 2023-11-02 PROCEDURE — 1126F PR PAIN SEVERITY QUANTIFIED, NO PAIN PRESENT: ICD-10-PCS | Mod: CPTII,S$GLB,, | Performed by: THORACIC SURGERY (CARDIOTHORACIC VASCULAR SURGERY)

## 2023-11-02 PROCEDURE — 3008F PR BODY MASS INDEX (BMI) DOCUMENTED: ICD-10-PCS | Mod: CPTII,S$GLB,, | Performed by: THORACIC SURGERY (CARDIOTHORACIC VASCULAR SURGERY)

## 2023-11-02 PROCEDURE — 99999 PR PBB SHADOW E&M-EST. PATIENT-LVL III: CPT | Mod: PBBFAC,,, | Performed by: THORACIC SURGERY (CARDIOTHORACIC VASCULAR SURGERY)

## 2023-11-02 PROCEDURE — 3079F PR MOST RECENT DIASTOLIC BLOOD PRESSURE 80-89 MM HG: ICD-10-PCS | Mod: CPTII,S$GLB,, | Performed by: THORACIC SURGERY (CARDIOTHORACIC VASCULAR SURGERY)

## 2023-11-02 PROCEDURE — 99204 OFFICE O/P NEW MOD 45 MIN: CPT | Mod: S$GLB,,, | Performed by: THORACIC SURGERY (CARDIOTHORACIC VASCULAR SURGERY)

## 2023-11-02 PROCEDURE — 3079F DIAST BP 80-89 MM HG: CPT | Mod: CPTII,S$GLB,, | Performed by: THORACIC SURGERY (CARDIOTHORACIC VASCULAR SURGERY)

## 2023-11-02 PROCEDURE — 4010F PR ACE/ARB THEARPY RXD/TAKEN: ICD-10-PCS | Mod: CPTII,S$GLB,, | Performed by: THORACIC SURGERY (CARDIOTHORACIC VASCULAR SURGERY)

## 2023-11-02 PROCEDURE — 99999 PR PBB SHADOW E&M-EST. PATIENT-LVL III: ICD-10-PCS | Mod: PBBFAC,,, | Performed by: THORACIC SURGERY (CARDIOTHORACIC VASCULAR SURGERY)

## 2023-11-02 RX ORDER — MUPIROCIN 20 MG/G
OINTMENT TOPICAL
Status: CANCELLED | OUTPATIENT
Start: 2023-11-02

## 2023-11-02 RX ORDER — CHLORHEXIDINE GLUCONATE ORAL RINSE 1.2 MG/ML
10 SOLUTION DENTAL
Status: CANCELLED | OUTPATIENT
Start: 2023-11-02
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
## 2023-11-14 PROBLEM — I65.29 CAROTID STENOSIS: Status: ACTIVE | Noted: 2023-11-14

## 2023-11-15 ENCOUNTER — TELEPHONE (OUTPATIENT)
Dept: CARDIOLOGY | Facility: CLINIC | Age: 68
End: 2023-11-15
Payer: MEDICARE

## 2023-11-15 NOTE — TELEPHONE ENCOUNTER
----- Message from Rosy Zhu PA-C sent at 11/15/2023 10:54 AM CST -----  Mr. Bartlett is s/p L CEA on 11/14. He will need 2 week follow up in [place].

## 2023-11-15 NOTE — TELEPHONE ENCOUNTER
----- Message from Josselyn Kruger sent at 11/15/2023 11:28 AM CST -----  Type:  Sooner Appointment Request    Caller is requesting a sooner appointment.  Caller declined first available appointment listed below.  Caller will not accept being placed on the waitlist and is requesting a message be sent to doctor.    Name of Caller:  pt   When is the first available appointment?  12/15--said he need to be seen sooner  Symptoms:  hospital f/u dc 11/15 need 1 week appt  Would the patient rather a call back or a response via [facility]? call  Best Call Back Number:  [phone] (home)     Additional Information:  thank you

## 2023-11-21 ENCOUNTER — TELEPHONE (OUTPATIENT)
Dept: VASCULAR SURGERY | Facility: CLINIC | Age: 68
End: 2023-11-21
Payer: MEDICARE

## 2023-11-21 NOTE — TELEPHONE ENCOUNTER
Pt c/o difficulty swallowing fluids only. Pt states he can eat soft foods without difficulty. Pt is a pt of Marimar Jimenez, NP - scheduled an appt to be evaluated by Dr Okeefe in ENT on Tuesday 11/28 at 920. Pt informed and verbalized understanding. Advised to report to ED with difficulty breathing or feels swelling in throat.      ----- Message from Steffi Foster LPN sent at 11/21/2023 10:44 AM CST -----  Contact: Self    ----- Message -----  From: Debra Santiago  Sent: 11/21/2023  10:37 AM CST  To: [facility] Staff    Type: Needs Medical Advice  Who Called:  Pt   Symptoms (please be specific):  Choking, trouble when eating       Best Call Back Number: [phone]    Additional Information: Please call to schedule follow up from surgery for these issues.

## 2023-11-28 ENCOUNTER — OFFICE VISIT (OUTPATIENT)
Dept: OTOLARYNGOLOGY | Facility: CLINIC | Age: 68
End: 2023-11-28
Payer: MEDICARE

## 2023-11-28 VITALS — HEIGHT: 68 IN | WEIGHT: 170.63 LBS | BODY MASS INDEX: 25.86 KG/M2

## 2023-11-28 DIAGNOSIS — T66.XXXS LATE EFFECT OF RADIATION: Primary | ICD-10-CM

## 2023-11-28 DIAGNOSIS — R22.1 THROAT SWELLING: ICD-10-CM

## 2023-11-28 DIAGNOSIS — R13.10 DYSPHAGIA, UNSPECIFIED TYPE: ICD-10-CM

## 2023-11-28 DIAGNOSIS — Z98.890 HISTORY OF NECK SURGERY: ICD-10-CM

## 2023-11-28 PROCEDURE — 3288F FALL RISK ASSESSMENT DOCD: CPT | Mod: CPTII,S$GLB,, | Performed by: STUDENT IN AN ORGANIZED HEALTH CARE EDUCATION/TRAINING PROGRAM

## 2023-11-28 PROCEDURE — 1159F MED LIST DOCD IN RCRD: CPT | Mod: CPTII,S$GLB,, | Performed by: STUDENT IN AN ORGANIZED HEALTH CARE EDUCATION/TRAINING PROGRAM

## 2023-11-28 PROCEDURE — 99214 PR OFFICE/OUTPT VISIT, EST, LEVL IV, 30-39 MIN: ICD-10-PCS | Mod: 25,S$GLB,, | Performed by: STUDENT IN AN ORGANIZED HEALTH CARE EDUCATION/TRAINING PROGRAM

## 2023-11-28 PROCEDURE — 1111F PR DISCHARGE MEDS RECONCILED W/ CURRENT OUTPATIENT MED LIST: ICD-10-PCS | Mod: CPTII,S$GLB,, | Performed by: STUDENT IN AN ORGANIZED HEALTH CARE EDUCATION/TRAINING PROGRAM

## 2023-11-28 PROCEDURE — 4010F ACE/ARB THERAPY RXD/TAKEN: CPT | Mod: CPTII,S$GLB,, | Performed by: STUDENT IN AN ORGANIZED HEALTH CARE EDUCATION/TRAINING PROGRAM

## 2023-11-28 PROCEDURE — 99214 OFFICE O/P EST MOD 30 MIN: CPT | Mod: 25,S$GLB,, | Performed by: STUDENT IN AN ORGANIZED HEALTH CARE EDUCATION/TRAINING PROGRAM

## 2023-11-28 PROCEDURE — 99999 PR PBB SHADOW E&M-EST. PATIENT-LVL III: CPT | Mod: PBBFAC,,, | Performed by: STUDENT IN AN ORGANIZED HEALTH CARE EDUCATION/TRAINING PROGRAM

## 2023-11-28 PROCEDURE — 1125F AMNT PAIN NOTED PAIN PRSNT: CPT | Mod: CPTII,S$GLB,, | Performed by: STUDENT IN AN ORGANIZED HEALTH CARE EDUCATION/TRAINING PROGRAM

## 2023-11-28 PROCEDURE — 4010F PR ACE/ARB THEARPY RXD/TAKEN: ICD-10-PCS | Mod: CPTII,S$GLB,, | Performed by: STUDENT IN AN ORGANIZED HEALTH CARE EDUCATION/TRAINING PROGRAM

## 2023-11-28 PROCEDURE — 1159F PR MEDICATION LIST DOCUMENTED IN MEDICAL RECORD: ICD-10-PCS | Mod: CPTII,S$GLB,, | Performed by: STUDENT IN AN ORGANIZED HEALTH CARE EDUCATION/TRAINING PROGRAM

## 2023-11-28 PROCEDURE — 3008F PR BODY MASS INDEX (BMI) DOCUMENTED: ICD-10-PCS | Mod: CPTII,S$GLB,, | Performed by: STUDENT IN AN ORGANIZED HEALTH CARE EDUCATION/TRAINING PROGRAM

## 2023-11-28 PROCEDURE — 31575 PR LARYNGOSCOPY, FLEXIBLE; DIAGNOSTIC: ICD-10-PCS | Mod: S$GLB,,, | Performed by: STUDENT IN AN ORGANIZED HEALTH CARE EDUCATION/TRAINING PROGRAM

## 2023-11-28 PROCEDURE — 1125F PR PAIN SEVERITY QUANTIFIED, PAIN PRESENT: ICD-10-PCS | Mod: CPTII,S$GLB,, | Performed by: STUDENT IN AN ORGANIZED HEALTH CARE EDUCATION/TRAINING PROGRAM

## 2023-11-28 PROCEDURE — 31575 DIAGNOSTIC LARYNGOSCOPY: CPT | Mod: S$GLB,,, | Performed by: STUDENT IN AN ORGANIZED HEALTH CARE EDUCATION/TRAINING PROGRAM

## 2023-11-28 PROCEDURE — 3288F PR FALLS RISK ASSESSMENT DOCUMENTED: ICD-10-PCS | Mod: CPTII,S$GLB,, | Performed by: STUDENT IN AN ORGANIZED HEALTH CARE EDUCATION/TRAINING PROGRAM

## 2023-11-28 PROCEDURE — 3008F BODY MASS INDEX DOCD: CPT | Mod: CPTII,S$GLB,, | Performed by: STUDENT IN AN ORGANIZED HEALTH CARE EDUCATION/TRAINING PROGRAM

## 2023-11-28 PROCEDURE — 1100F PR PT FALLS ASSESS DOC 2+ FALLS/FALL W/INJURY/YR: ICD-10-PCS | Mod: CPTII,S$GLB,, | Performed by: STUDENT IN AN ORGANIZED HEALTH CARE EDUCATION/TRAINING PROGRAM

## 2023-11-28 PROCEDURE — 1100F PTFALLS ASSESS-DOCD GE2>/YR: CPT | Mod: CPTII,S$GLB,, | Performed by: STUDENT IN AN ORGANIZED HEALTH CARE EDUCATION/TRAINING PROGRAM

## 2023-11-28 PROCEDURE — 1111F DSCHRG MED/CURRENT MED MERGE: CPT | Mod: CPTII,S$GLB,, | Performed by: STUDENT IN AN ORGANIZED HEALTH CARE EDUCATION/TRAINING PROGRAM

## 2023-11-28 PROCEDURE — 99999 PR PBB SHADOW E&M-EST. PATIENT-LVL III: ICD-10-PCS | Mod: PBBFAC,,, | Performed by: STUDENT IN AN ORGANIZED HEALTH CARE EDUCATION/TRAINING PROGRAM

## 2023-11-28 RX ORDER — IPRATROPIUM BROMIDE 42 UG/1
2 SPRAY, METERED NASAL 3 TIMES DAILY
Qty: 15 ML | Refills: 6 | Status: SHIPPED | OUTPATIENT
Start: 2023-11-28 | End: 2023-12-28

## 2023-11-28 RX ORDER — GLYCOPYRROLATE 1 MG/1
1 TABLET ORAL 2 TIMES DAILY
Qty: 28 TABLET | Refills: 0 | Status: SHIPPED | OUTPATIENT
Start: 2023-11-28 | End: 2023-12-12

## 2023-11-28 NOTE — PROGRESS NOTES
"Otolaryngology Clinic Note    Subjective:       Patient ID: Edy Bartlett III is a 68 y.o. male.    Chief Complaint: swallowing problems      History of Present Illness: Edy Bartlett III is a 68 y.o. male presenting with voice and swallowing changes since CEA on left. Has had trouble swallowing since RT 2015 (burned to about his larynx), now really bad. He is choking on liquids every time. Thicker things are ok. Was limited solids since radiation. Has been more hoarse. Had ACDF on right years ago.     11/14/23  "Difficult left carotid endarterectomy with bovine patch angioplasty   over an indwelling temporary shunt.  Operation was made more difficult and long   lasting because of the scar tissue related to previous radiation treatment and   the high lesion within the internal carotid artery.  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 Treated with radiation and chemo at [facility]. Patient states he is dealing with the after-effects of radiation burns to his left ear canal/jaw/teeth.       Past Surgical History:   Procedure Laterality Date    APPENDECTOMY      CAROTID ENDARTERECTOMY Left 11/14/2023    Procedure: ENDARTERECTOMY-CAROTID;  Surgeon: Jules Brown MD;  Location: [facility];  Service: Peripheral Vascular;  Laterality: Left;    CARPAL TUNNEL RELEASE      both hands/ lt hand x 2/ rt 1    CERVICAL SPINE SURGERY      x 2;fusion C3-7, can only bend his neck a little    EPIDURAL BLOCK INJECTION      injection for pain    FRACTURE SURGERY      ankle    hematoma removed  2006    thigh    HERNIA REPAIR      R inguinal    KNEE ARTHROSCOPY      PROSTATE BIOPSY      TENDON REPAIR      TRIGGER FINGER RELEASE  2007    left    TUMOR EXCISION      fatty tumor to left front of head, has since grown back    UNICOMPARTMENTAL " ARTHROPLASTY OF KNEE Left 9/10/2019    Procedure: ARTHROPLASTY, KNEE, UNICOMPARTMENTAL;  Surgeon: Nelson Silva MD;  Location: [facility];  Service: Orthopedics;  Laterality: Left;    urolift       Past Medical History:   Diagnosis Date    BPH (benign prostatic hypertrophy) with urinary obstruction     Cerebellar stroke     Complication of anesthesia     gets very sore neck post-op if his head is twisted or bent too far with intubation    DDD (degenerative disc disease)     DDD (degenerative disc disease), cervical     DDD (degenerative disc disease), lumbar     DJD (degenerative joint disease) of knee 12/10/2012    ED (erectile dysfunction)     Elevated PSA     Hyperlipidemia     Hypertension     Mass of skin 2012    left temple, possible lipoma    Mobility impaired     cane    Neuropathy     feet    OA (osteoarthritis of spine)     Throat cancer     squamous cell - per Dr. Austin     Social Determinants of Health     Tobacco Use: Low Risk  (11/28/2023)    Patient History     Smoking Tobacco Use: Never     Smokeless Tobacco Use: Never     Passive Exposure: Not on file   Alcohol Use: Not on file   Financial Resource Strain: Not on file   Food Insecurity: No Food Insecurity (11/14/2023)    Hunger Vital Sign     Worried About Running Out of Food in the Last Year: Never true     Ran Out of Food in the Last Year: Never true   Transportation Needs: No Transportation Needs (11/14/2023)    PRAPARE - Transportation     Lack of Transportation (Medical): No     Lack of Transportation (Non-Medical): No   Physical Activity: Not on file   Stress: Not on file   Social Connections: Not on file   Housing Stability: Low Risk  (11/14/2023)    Housing Stability Vital Sign     Unable to Pay for Housing in the Last Year: No     Number of Places Lived in the Last Year: 1     Unstable Housing in the Last Year: No   Depression: Low Risk  (1/26/2023)    Depression     Last PHQ-4: Flowsheet Data: 0     Review of patient's allergies  indicates:   Allergen Reactions    Statins-hmg-coa reductase inhibitors Other (See Comments)     Not allergy, makes him feel awful     Current Outpatient Medications   Medication Instructions    aspirin (ECOTRIN) 81 MG EC tablet TAKE 1 TABLET BY MOUTH EVERY DAY    clopidogreL (PLAVIX) 75 mg, Oral, Daily    glycopyrrolate (ROBINUL) 1 mg, Oral, 2 times daily    HYDROcodone-acetaminophen (NORCO) 5-325 mg per tablet 1 tablet, Oral, Every 6 hours PRN    ipratropium (ATROVENT) 42 mcg (0.06 %) nasal spray 2 sprays, Each Nostril, 3 times daily    lisinopriL 10 mg, Oral, Daily    PRALUENT PEN 75 mg, Subcutaneous, Every 14 days         ENT ROS negative except as stated above.     Patient answers are not available for this visit.            Objective:      There were no vitals filed for this visit.    General: NAD, well appearing  Eyes: Normal conjunctiva and lids  Face: symmetric, nerve intact  Nose: The nose is without any evidence of any deformity. The nasal mucosa is moist. The septum is midline. There is no evidence of septal hematoma. The turbinates are without abnormality.   Ears: The ears are with normal-appearing pinna. Examination of the canals is normal appearing bilaterally. There is no drainage or erythema noted. The tympanic membranes are normal appearing with pearly color, normal-appearing landmarks and normal light reflex. Hearing is grossly intact.  Mouth: No obvious abnormalities to the lips. The teeth are unremarkable. The gingivae are without any obvious evidence of infection or lesion. The oral mucosa is moist and pink. There are no obvious masses to the hard or soft palate.   Oropharynx: The uvula is midline.  The tongue is midline. The posterior pharynx is without erythema or exudate. The tonsils are normal appearing.  Salivary glands: The salivary glands are symmetric and not enlarged, no masses  Neck: thick induration and RT changes left neck, new incision, old scar from ACDF on right.   Psych: Normal  mood and affect.   Neuro: Grossly intact  Speech: fluent    Procedure: Flexible laryngoscopy  Indications: dysphonia, dysphagia  Verbal consent obtained  Anesthesia: lidocaine and phenylephrine nasal spray  Procedure: Scope was passed into right or left nare, to nasopharynx and down to visualize the glottis. Findings below. Patient tolerated procedure well.     - Nose WNL  - Nasopharynx- WNL, no masses  - Oropharynx- BOT symmetric, no masses, bruising to left BOT  - Hypopharynx and piriform sinuses- no masses on trumpet maneuver, bruising and edema to left  - Supraglottis- Arytenoids intact, no masses, + edematous and erythematous  - Glottis- Bilateral vocal folds intact with mildly limited abduction, no masses, does have poor sensation, penetrating saliva which pools in larynx.   - Glottic closure- complete       Assessment and Plan:       1. Late effect of radiation    2. History of neck surgery    3. Throat swelling    4. Dysphagia, unspecified type [R13.10]          Bilateral vocal fold motion intact, slightly limited by RT changes and post op edema and bruising. Pooling and mild aspiration of saliva on scope.   Robinul BID next 2 weeks  Atrovent TID next month    Reviewed avoiding thin liquids, caution with oral intake, take pills with apple sauce, pudding, something thicker than water. Reviewed PNA risks.     RTC: 1 month recheck, swallow study if not better.     Plan of care was discussed in detail with the patient, who agreed with the plan as above. All questions were answered in detail.     Neda Okeefe MD  Otolaryngology

## 2023-11-28 NOTE — PATIENT INSTRUCTIONS
Avoid thin liquids, caution with oral intake, take pills with apple sauce, pudding, something thicker than water. Can get thickener at grocery store if needed.     Contact with fever, difficult breathing.     (Thick it, thicken to nectar 4 oz water/liquid with 3.5 tsp thickener)    Take robinul twice a day for 2 weeks  Use nasal spray (atrovent) 3 times a day for next month  Both will help dry your drainage.

## 2023-11-30 ENCOUNTER — OFFICE VISIT (OUTPATIENT)
Dept: VASCULAR SURGERY | Facility: CLINIC | Age: 68
End: 2023-11-30
Payer: MEDICARE

## 2023-11-30 VITALS
HEART RATE: 107 BPM | BODY MASS INDEX: 25.7 KG/M2 | DIASTOLIC BLOOD PRESSURE: 82 MMHG | SYSTOLIC BLOOD PRESSURE: 124 MMHG | WEIGHT: 169 LBS

## 2023-11-30 DIAGNOSIS — I65.23 BILATERAL CAROTID ARTERY STENOSIS: ICD-10-CM

## 2023-11-30 DIAGNOSIS — Z98.890 S/P CAROTID ENDARTERECTOMY: Primary | ICD-10-CM

## 2023-11-30 PROCEDURE — 99999 PR PBB SHADOW E&M-EST. PATIENT-LVL III: CPT | Mod: PBBFAC,,, | Performed by: THORACIC SURGERY (CARDIOTHORACIC VASCULAR SURGERY)

## 2023-11-30 PROCEDURE — 4010F PR ACE/ARB THEARPY RXD/TAKEN: ICD-10-PCS | Mod: CPTII,S$GLB,, | Performed by: THORACIC SURGERY (CARDIOTHORACIC VASCULAR SURGERY)

## 2023-11-30 PROCEDURE — 1101F PT FALLS ASSESS-DOCD LE1/YR: CPT | Mod: CPTII,S$GLB,, | Performed by: THORACIC SURGERY (CARDIOTHORACIC VASCULAR SURGERY)

## 2023-11-30 PROCEDURE — 3079F PR MOST RECENT DIASTOLIC BLOOD PRESSURE 80-89 MM HG: ICD-10-PCS | Mod: CPTII,S$GLB,, | Performed by: THORACIC SURGERY (CARDIOTHORACIC VASCULAR SURGERY)

## 2023-11-30 PROCEDURE — 1125F PR PAIN SEVERITY QUANTIFIED, PAIN PRESENT: ICD-10-PCS | Mod: CPTII,S$GLB,, | Performed by: THORACIC SURGERY (CARDIOTHORACIC VASCULAR SURGERY)

## 2023-11-30 PROCEDURE — 4010F ACE/ARB THERAPY RXD/TAKEN: CPT | Mod: CPTII,S$GLB,, | Performed by: THORACIC SURGERY (CARDIOTHORACIC VASCULAR SURGERY)

## 2023-11-30 PROCEDURE — 99999 PR PBB SHADOW E&M-EST. PATIENT-LVL III: ICD-10-PCS | Mod: PBBFAC,,, | Performed by: THORACIC SURGERY (CARDIOTHORACIC VASCULAR SURGERY)

## 2023-11-30 PROCEDURE — 1160F PR REVIEW ALL MEDS BY PRESCRIBER/CLIN PHARMACIST DOCUMENTED: ICD-10-PCS | Mod: CPTII,S$GLB,, | Performed by: THORACIC SURGERY (CARDIOTHORACIC VASCULAR SURGERY)

## 2023-11-30 PROCEDURE — 3074F SYST BP LT 130 MM HG: CPT | Mod: CPTII,S$GLB,, | Performed by: THORACIC SURGERY (CARDIOTHORACIC VASCULAR SURGERY)

## 2023-11-30 PROCEDURE — 3288F PR FALLS RISK ASSESSMENT DOCUMENTED: ICD-10-PCS | Mod: CPTII,S$GLB,, | Performed by: THORACIC SURGERY (CARDIOTHORACIC VASCULAR SURGERY)

## 2023-11-30 PROCEDURE — 1159F PR MEDICATION LIST DOCUMENTED IN MEDICAL RECORD: ICD-10-PCS | Mod: CPTII,S$GLB,, | Performed by: THORACIC SURGERY (CARDIOTHORACIC VASCULAR SURGERY)

## 2023-11-30 PROCEDURE — 3074F PR MOST RECENT SYSTOLIC BLOOD PRESSURE < 130 MM HG: ICD-10-PCS | Mod: CPTII,S$GLB,, | Performed by: THORACIC SURGERY (CARDIOTHORACIC VASCULAR SURGERY)

## 2023-11-30 PROCEDURE — 1159F MED LIST DOCD IN RCRD: CPT | Mod: CPTII,S$GLB,, | Performed by: THORACIC SURGERY (CARDIOTHORACIC VASCULAR SURGERY)

## 2023-11-30 PROCEDURE — 99024 PR POST-OP FOLLOW-UP VISIT: ICD-10-PCS | Mod: S$GLB,,, | Performed by: THORACIC SURGERY (CARDIOTHORACIC VASCULAR SURGERY)

## 2023-11-30 PROCEDURE — 1125F AMNT PAIN NOTED PAIN PRSNT: CPT | Mod: CPTII,S$GLB,, | Performed by: THORACIC SURGERY (CARDIOTHORACIC VASCULAR SURGERY)

## 2023-11-30 PROCEDURE — 99024 POSTOP FOLLOW-UP VISIT: CPT | Mod: S$GLB,,, | Performed by: THORACIC SURGERY (CARDIOTHORACIC VASCULAR SURGERY)

## 2023-11-30 PROCEDURE — 1101F PR PT FALLS ASSESS DOC 0-1 FALLS W/OUT INJ PAST YR: ICD-10-PCS | Mod: CPTII,S$GLB,, | Performed by: THORACIC SURGERY (CARDIOTHORACIC VASCULAR SURGERY)

## 2023-11-30 PROCEDURE — 3288F FALL RISK ASSESSMENT DOCD: CPT | Mod: CPTII,S$GLB,, | Performed by: THORACIC SURGERY (CARDIOTHORACIC VASCULAR SURGERY)

## 2023-11-30 PROCEDURE — 3079F DIAST BP 80-89 MM HG: CPT | Mod: CPTII,S$GLB,, | Performed by: THORACIC SURGERY (CARDIOTHORACIC VASCULAR SURGERY)

## 2023-11-30 PROCEDURE — 1160F RVW MEDS BY RX/DR IN RCRD: CPT | Mod: CPTII,S$GLB,, | Performed by: THORACIC SURGERY (CARDIOTHORACIC VASCULAR SURGERY)

## 2023-11-30 NOTE — PROGRESS NOTES
This patient has carotid occlusive disease.  He is status post left carotid endarterectomy.  This was a difficult procedure due to his previous radiation for head and neck cancer.  He comes back to the office today in follow-up.    Medicines are noted and part of the epic record.  His problem list was reviewed.    On exam vital signs are stable.  His surgical wounds are clean and dry without evidence of infection.  Neurologically he is more or less intact.  He has some difficulty with swallowing liquids and saw the ENT service who suggested that he primarily has swelling.  His vocal cords are functioning satisfactorily.    I reassured the patient that his symptoms should gradually improve.  If not he should call the office.    He should also have a carotid ultrasound in 3-4 months.

## 2023-12-01 ENCOUNTER — TELEPHONE (OUTPATIENT)
Dept: VASCULAR SURGERY | Facility: CLINIC | Age: 68
End: 2023-12-01
Payer: MEDICARE

## 2023-12-01 NOTE — TELEPHONE ENCOUNTER
Per Dr Brown Prednisone 10mg #5 and Predinisone 5mg # 5 called into pts pharmacy to treat swelling post CEA surgery, pt instructed to take 10mg for 5 days and 5mg for 5 days. Will call in 10 days to check up on progress.

## 2023-12-11 ENCOUNTER — TELEPHONE (OUTPATIENT)
Dept: VASCULAR SURGERY | Facility: CLINIC | Age: 68
End: 2023-12-11
Payer: MEDICARE

## 2023-12-11 NOTE — TELEPHONE ENCOUNTER
Spoke  to pt to discuss swallowing since taking Prednisone? Pt stated his is able to drink fluids without difficulty, he is now able to eat softer foods and soups. Pt states he still feels if he bends over when eating or drinking it will come right back out. Pt also c/o of having a headache since surgery but it does get better as each day passes, advised pt to call office back by the end of the week if it continues or gets worse and unrelieved with OTC pain relievers. Pt verbalized understanding.

## 2024-01-31 ENCOUNTER — OFFICE VISIT (OUTPATIENT)
Dept: FAMILY MEDICINE | Facility: CLINIC | Age: 69
End: 2024-01-31
Payer: MEDICARE

## 2024-01-31 ENCOUNTER — HOSPITAL ENCOUNTER (OUTPATIENT)
Dept: RADIOLOGY | Facility: HOSPITAL | Age: 69
Discharge: HOME | End: 2024-01-31
Attending: THORACIC SURGERY (CARDIOTHORACIC VASCULAR SURGERY)
Payer: MEDICARE

## 2024-01-31 VITALS
HEART RATE: 76 BPM | HEIGHT: 68 IN | SYSTOLIC BLOOD PRESSURE: 128 MMHG | WEIGHT: 179 LBS | DIASTOLIC BLOOD PRESSURE: 86 MMHG | OXYGEN SATURATION: 97 % | BODY MASS INDEX: 27.13 KG/M2

## 2024-01-31 DIAGNOSIS — I10 ESSENTIAL HYPERTENSION: ICD-10-CM

## 2024-01-31 DIAGNOSIS — H90.3 BILATERAL SENSORINEURAL HEARING LOSS: ICD-10-CM

## 2024-01-31 DIAGNOSIS — I69.351 HEMIPLEGIA AND HEMIPARESIS FOLLOWING CEREBRAL INFARCTION AFFECTING RIGHT DOMINANT SIDE: ICD-10-CM

## 2024-01-31 DIAGNOSIS — E78.2 MIXED HYPERLIPIDEMIA: Primary | ICD-10-CM

## 2024-01-31 DIAGNOSIS — M54.2 CERVICALGIA: ICD-10-CM

## 2024-01-31 DIAGNOSIS — R13.19 ESOPHAGEAL DYSPHAGIA: ICD-10-CM

## 2024-01-31 DIAGNOSIS — Z86.73 HISTORY OF CVA (CEREBROVASCULAR ACCIDENT): ICD-10-CM

## 2024-01-31 DIAGNOSIS — I65.23 BILATERAL CAROTID ARTERY STENOSIS: ICD-10-CM

## 2024-01-31 DIAGNOSIS — Z12.5 PROSTATE CANCER SCREENING: ICD-10-CM

## 2024-01-31 DIAGNOSIS — Z98.890 S/P CAROTID ENDARTERECTOMY: ICD-10-CM

## 2024-01-31 DIAGNOSIS — R73.01 IMPAIRED FASTING BLOOD SUGAR: ICD-10-CM

## 2024-01-31 PROBLEM — I63.542 CEREBROVASCULAR ACCIDENT (CVA) DUE TO OCCLUSION OF LEFT CEREBELLAR ARTERY: Status: RESOLVED | Noted: 2020-05-08 | Resolved: 2024-01-31

## 2024-01-31 PROCEDURE — 1160F RVW MEDS BY RX/DR IN RCRD: CPT | Mod: CPTII,S$GLB,, | Performed by: INTERNAL MEDICINE

## 2024-01-31 PROCEDURE — 1159F MED LIST DOCD IN RCRD: CPT | Mod: CPTII,S$GLB,, | Performed by: INTERNAL MEDICINE

## 2024-01-31 PROCEDURE — 1125F AMNT PAIN NOTED PAIN PRSNT: CPT | Mod: CPTII,S$GLB,, | Performed by: INTERNAL MEDICINE

## 2024-01-31 PROCEDURE — 99214 OFFICE O/P EST MOD 30 MIN: CPT | Mod: S$GLB,,, | Performed by: INTERNAL MEDICINE

## 2024-01-31 PROCEDURE — 99999 PR PBB SHADOW E&M-EST. PATIENT-LVL III: CPT | Mod: PBBFAC,,, | Performed by: INTERNAL MEDICINE

## 2024-01-31 PROCEDURE — 1101F PT FALLS ASSESS-DOCD LE1/YR: CPT | Mod: CPTII,S$GLB,, | Performed by: INTERNAL MEDICINE

## 2024-01-31 PROCEDURE — 93880 EXTRACRANIAL BILAT STUDY: CPT | Mod: TC,PO

## 2024-01-31 PROCEDURE — 3008F BODY MASS INDEX DOCD: CPT | Mod: CPTII,S$GLB,, | Performed by: INTERNAL MEDICINE

## 2024-01-31 PROCEDURE — 4010F ACE/ARB THERAPY RXD/TAKEN: CPT | Mod: CPTII,S$GLB,, | Performed by: INTERNAL MEDICINE

## 2024-01-31 PROCEDURE — 3074F SYST BP LT 130 MM HG: CPT | Mod: CPTII,S$GLB,, | Performed by: INTERNAL MEDICINE

## 2024-01-31 PROCEDURE — 3079F DIAST BP 80-89 MM HG: CPT | Mod: CPTII,S$GLB,, | Performed by: INTERNAL MEDICINE

## 2024-01-31 PROCEDURE — 93880 EXTRACRANIAL BILAT STUDY: CPT | Mod: 26,,, | Performed by: RADIOLOGY

## 2024-01-31 PROCEDURE — 3288F FALL RISK ASSESSMENT DOCD: CPT | Mod: CPTII,S$GLB,, | Performed by: INTERNAL MEDICINE

## 2024-01-31 RX ORDER — HYDROCODONE BITARTRATE AND ACETAMINOPHEN 5; 325 MG/1; MG/1
1 TABLET ORAL
Qty: 30 TABLET | Refills: 0 | Status: SHIPPED | OUTPATIENT
Start: 2024-01-31 | End: 2024-04-29 | Stop reason: SDUPTHER

## 2024-01-31 NOTE — PROGRESS NOTES
"[facility]  Primary Care   [address]       Patient ID: Edy Bartlett III     Chief Complaint:   Chief Complaint   Patient presents with    Follow-up     6 month           HPI:  Routine follow-up.  He needs me to complete some paperwork so he can continue to get disability secondary to the stroke he had in 2020 causing left upper extremity weakness.  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  I completed the forms for him.  Blood pressure looks to be well-controlled.  He is due for labs which we will get today.  He recently had a left carotid endarterectomy that went well but he has been left with some postsurgical pain.  He takes hydrocodone possibly once per day I am requests a refill so I have given that to him.  I do recommend he get a shingles shot from his local pharmacy at his earliest convenience.  I did send a message to his Vascular Surgeon to find out when I can pause his aspirin Plavix so that we can get a colonoscopy.  He does complain of dysphagia if he eats too much.  Specifically he will eat a volume of food and if it is too much whatever he eats and drinks will come out of his mouth whenever he bends over.  I do wonder if he is got some esophageal strictures so I placed a case request for an EGD.    Review of Systems       Dysphagia     Objective:      Physical Exam   Physical Exam       Left Upper Extremity weakness    Vitals:   Vitals:    01/31/24 1412   BP: 128/86   Pulse: 76   SpO2: 97%   Weight: 81.2 kg (179 lb 0.2 oz)   Height: 5' 8" (1.727 m)        Assessment:           Plan:       Edy Bartlett III  was seen today for follow-up and may need lab work.    Diagnoses and all orders for this visit:    Edy was seen today for follow-up.    Diagnoses and all orders for this visit:    Mixed hyperlipidemia  -     Comprehensive Metabolic Panel; Future  -     Lipid Panel; Future  Check " labs      History of CVA (cerebrovascular accident)  -     Lipid Panel; Future  Check labs      Impaired fasting blood sugar  -     Hemoglobin A1C; Future  Check labs      Prostate cancer screening  -     PSA, Screening; Future  Check labs    Previously elevated at 11    Esophageal dysphagia  -     Case Request Endoscopy: ESOPHAGOGASTRODUODENOSCOPY (EGD)    Essential hypertension  Controlled with med     Hemiplegia and hemiparesis following cerebral infarction affecting right dominant side  Stable    Cervicalgia  -     HYDROcodone-acetaminophen (NORCO) 5-325 mg per tablet; Take 1 tablet by mouth every 24 hours as needed for Pain.  Monitor     Bilateral sensorineural hearing loss  Needs hearing aids          Huy Cordova MD

## 2024-02-02 ENCOUNTER — TELEPHONE (OUTPATIENT)
Dept: GASTROENTEROLOGY | Facility: CLINIC | Age: 69
End: 2024-02-02
Payer: MEDICARE

## 2024-02-02 NOTE — TELEPHONE ENCOUNTER
Diagnosis is confirmed from the case order. Dysphagia  BMI: 27.22  Medical issues? CVA  Blood thinners? Plavix  Preferred day: Anytime soon.    Inform pt I will send prep instructions via [employer] ONLY.   Pt verbalizes understanding to the statements above.

## 2024-02-05 ENCOUNTER — TELEPHONE (OUTPATIENT)
Dept: GASTROENTEROLOGY | Facility: CLINIC | Age: 69
End: 2024-02-05
Payer: MEDICARE

## 2024-02-05 DIAGNOSIS — I65.23 BILATERAL CAROTID ARTERY STENOSIS: ICD-10-CM

## 2024-02-05 DIAGNOSIS — Z98.890 S/P CAROTID ENDARTERECTOMY: Primary | ICD-10-CM

## 2024-02-05 NOTE — TELEPHONE ENCOUNTER
----- Message from Sara Rangel sent at 2/5/2024 10:39 AM CST -----  Contact: pt [phone]  Type: Needs Medical Advice  Who Called:  Pt     Best Call Back Number: [phone]    Additional Information: Pt requesting a check in time for his surgery on 02/08. Pt has to arrange his transportation. Pls call back and advise

## 2024-02-08 ENCOUNTER — ANESTHESIA EVENT (OUTPATIENT)
Dept: ENDOSCOPY | Facility: HOSPITAL | Age: 69
End: 2024-02-08
Payer: MEDICARE

## 2024-02-08 ENCOUNTER — ANESTHESIA (OUTPATIENT)
Dept: ENDOSCOPY | Facility: HOSPITAL | Age: 69
End: 2024-02-08
Payer: MEDICARE

## 2024-02-08 ENCOUNTER — HOSPITAL ENCOUNTER (OUTPATIENT)
Facility: HOSPITAL | Age: 69
Discharge: HOME | End: 2024-02-08
Attending: INTERNAL MEDICINE | Admitting: INTERNAL MEDICINE
Payer: MEDICARE

## 2024-02-08 VITALS
TEMPERATURE: 98 F | HEIGHT: 68 IN | WEIGHT: 170 LBS | RESPIRATION RATE: 16 BRPM | SYSTOLIC BLOOD PRESSURE: 115 MMHG | BODY MASS INDEX: 25.76 KG/M2 | HEART RATE: 70 BPM | DIASTOLIC BLOOD PRESSURE: 65 MMHG | OXYGEN SATURATION: 99 %

## 2024-02-08 DIAGNOSIS — R11.10 REGURGITATION AND RECHEWING: Primary | ICD-10-CM

## 2024-02-08 DIAGNOSIS — R13.10 DYSPHAGIA: ICD-10-CM

## 2024-02-08 PROCEDURE — 63600175 PHARM REV CODE 636 W HCPCS: Mod: PO | Performed by: NURSE ANESTHETIST, CERTIFIED REGISTERED

## 2024-02-08 PROCEDURE — 25000003 PHARM REV CODE 250: Mod: PO | Performed by: NURSE ANESTHETIST, CERTIFIED REGISTERED

## 2024-02-08 PROCEDURE — 43239 EGD BIOPSY SINGLE/MULTIPLE: CPT | Mod: 59,PO | Performed by: INTERNAL MEDICINE

## 2024-02-08 PROCEDURE — 43239 EGD BIOPSY SINGLE/MULTIPLE: CPT | Mod: 59,,, | Performed by: INTERNAL MEDICINE

## 2024-02-08 PROCEDURE — 88305 TISSUE EXAM BY PATHOLOGIST: CPT | Mod: 59,PO | Performed by: PATHOLOGY

## 2024-02-08 PROCEDURE — 63600175 PHARM REV CODE 636 W HCPCS: Mod: PO | Performed by: INTERNAL MEDICINE

## 2024-02-08 PROCEDURE — D9220A PRA ANESTHESIA: Mod: CRNA,,, | Performed by: NURSE ANESTHETIST, CERTIFIED REGISTERED

## 2024-02-08 PROCEDURE — 43249 ESOPH EGD DILATION <30 MM: CPT | Mod: ,,, | Performed by: INTERNAL MEDICINE

## 2024-02-08 PROCEDURE — 27201012 HC FORCEPS, HOT/COLD, DISP: Mod: PO | Performed by: INTERNAL MEDICINE

## 2024-02-08 PROCEDURE — 88305 TISSUE EXAM BY PATHOLOGIST: CPT | Mod: 26,,, | Performed by: PATHOLOGY

## 2024-02-08 PROCEDURE — 37000009 HC ANESTHESIA EA ADD 15 MINS: Mod: PO | Performed by: INTERNAL MEDICINE

## 2024-02-08 PROCEDURE — 37000008 HC ANESTHESIA 1ST 15 MINUTES: Mod: PO | Performed by: INTERNAL MEDICINE

## 2024-02-08 PROCEDURE — 43249 ESOPH EGD DILATION <30 MM: CPT | Mod: PO | Performed by: INTERNAL MEDICINE

## 2024-02-08 PROCEDURE — D9220A PRA ANESTHESIA: Mod: ANES,,, | Performed by: ANESTHESIOLOGY

## 2024-02-08 PROCEDURE — C1726 CATH, BAL DIL, NON-VASCULAR: HCPCS | Mod: PO | Performed by: INTERNAL MEDICINE

## 2024-02-08 RX ORDER — PROPOFOL 10 MG/ML
VIAL (ML) INTRAVENOUS
Status: DISCONTINUED | OUTPATIENT
Start: 2024-02-08 | End: 2024-02-08

## 2024-02-08 RX ORDER — SODIUM CHLORIDE, SODIUM LACTATE, POTASSIUM CHLORIDE, CALCIUM CHLORIDE 600; 310; 30; 20 MG/100ML; MG/100ML; MG/100ML; MG/100ML
INJECTION, SOLUTION INTRAVENOUS CONTINUOUS
Status: DISCONTINUED | OUTPATIENT
Start: 2024-02-08 | End: 2024-02-08 | Stop reason: HOSPADM

## 2024-02-08 RX ORDER — LIDOCAINE HYDROCHLORIDE 20 MG/ML
INJECTION INTRAVENOUS
Status: DISCONTINUED | OUTPATIENT
Start: 2024-02-08 | End: 2024-02-08

## 2024-02-08 RX ORDER — SODIUM CHLORIDE 0.9 % (FLUSH) 0.9 %
10 SYRINGE (ML) INJECTION
Status: DISCONTINUED | OUTPATIENT
Start: 2024-02-08 | End: 2024-02-08 | Stop reason: HOSPADM

## 2024-02-08 RX ORDER — PANTOPRAZOLE SODIUM 40 MG/1
40 TABLET, DELAYED RELEASE ORAL
Qty: 90 TABLET | Refills: 3 | Status: SHIPPED | OUTPATIENT
Start: 2024-02-08

## 2024-02-08 RX ADMIN — LIDOCAINE HYDROCHLORIDE 75 MG: 20 INJECTION INTRAVENOUS at 10:02

## 2024-02-08 RX ADMIN — PROPOFOL 40 MG: 10 INJECTION, EMULSION INTRAVENOUS at 11:02

## 2024-02-08 RX ADMIN — PROPOFOL 40 MG: 10 INJECTION, EMULSION INTRAVENOUS at 10:02

## 2024-02-08 RX ADMIN — SODIUM CHLORIDE, POTASSIUM CHLORIDE, SODIUM LACTATE AND CALCIUM CHLORIDE: 600; 310; 30; 20 INJECTION, SOLUTION INTRAVENOUS at 10:02

## 2024-02-08 RX ADMIN — PROPOFOL 100 MG: 10 INJECTION, EMULSION INTRAVENOUS at 10:02

## 2024-02-08 NOTE — ANESTHESIA PREPROCEDURE EVALUATION
02/08/2024  Edy Bartlett III is a 68 y.o., male.      Pre-op Assessment    I have reviewed the Patient Summary Reports.     I have reviewed the Nursing Notes. I have reviewed the NPO Status.   I have reviewed the Medications.     Review of Systems  Anesthesia Hx:  No problems with previous Anesthesia                Hematology/Oncology:                        --  Cancer in past history:                 Oncology Comments: Hx NP CA     Cardiovascular:     Hypertension          PVD hyperlipidemia                       Hypertension         Musculoskeletal:  Arthritis        Arthritis     Spine Disorders: cervical            Neurological:   CVA, residual symptoms        Right weakness    Arthritis              CVA - Cerebrovasular Accident                     Physical Exam  General: Well nourished    Airway:  Mallampati: II   Mouth Opening: Normal  TM Distance: Normal  Neck ROM: Normal ROM        Anesthesia Plan  Type of Anesthesia, risks & benefits discussed:    Anesthesia Type: Gen Natural Airway  Intra-op Monitoring Plan: Standard ASA Monitors  Induction:  IV  Informed Consent: Informed consent signed with the Patient and all parties understand the risks and agree with anesthesia plan.  All questions answered.   ASA Score: 3    Ready For Surgery From Anesthesia Perspective.     .

## 2024-02-08 NOTE — ANESTHESIA POSTPROCEDURE EVALUATION
Anesthesia Post Evaluation    Patient: Edy Bartlett III    Procedure(s) Performed: Procedure(s) (LRB):  ESOPHAGOGASTRODUODENOSCOPY (EGD) (N/A)    Final Anesthesia Type: general      Patient location during evaluation: PACU  Patient participation: Yes- Able to Participate  Level of consciousness: awake and alert  Post-procedure vital signs: reviewed and stable  Pain management: adequate  Airway patency: patent    PONV status at discharge: No PONV  Anesthetic complications: no      Cardiovascular status: blood pressure returned to baseline  Respiratory status: unassisted  Hydration status: euvolemic  Follow-up not needed.              Vitals Value Taken Time   /65 02/08/24 1150   Temp 36.4 °C (97.5 °F) 02/08/24 1120   Pulse 70 02/08/24 1150   Resp 16 02/08/24 1150   SpO2 99 % 02/08/24 1150         Event Time   Out of Recovery 12:00:00         Pain/Ciara Score: Ciara Score: 10 (2/8/2024 11:50 AM)

## 2024-02-08 NOTE — PROVATION PATIENT INSTRUCTIONS
Discharge Summary/Instructions after an Endoscopic Procedure  Patient Name: Edy Bartlett  Patient MRN: [id number]  Patient YOB: 1955 Thursday, February 8, 2024  Alfonso Cummings MD  Dear patient,  As a result of recent federal legislation (The Federal Cures Act), you may   receive lab or pathology results from your procedure in your [facility]   account before your physician is able to contact you. Your physician or   their representative will relay the results to you with their   recommendations at their soonest availability.  Thank you,  RESTRICTIONS:  During your procedure today, you received medications for sedation.  These   medications may affect your judgment, balance and coordination.  Therefore,   for 24 hours, you have the following restrictions:   - DO NOT drive a car, operate machinery, make legal/financial decisions,   sign important papers or drink alcohol.    ACTIVITY:  Today: no heavy lifting, straining or running due to procedural   sedation/anesthesia.  The following day: return to full activity including work.  DIET:  Eat and drink normally unless instructed otherwise.     TREATMENT FOR COMMON SIDE EFFECTS:  - Mild abdominal pain, nausea, belching, bloating or excessive gas:  rest,   eat lightly and use a heating pad.  - Sore Throat: treat with throat lozenges and/or gargle with warm salt   water.  - Because air was used during the procedure, expelling large amounts of air   from your rectum or belching is normal.  - If a bowel prep was taken, you may not have a bowel movement for 1-3 days.    This is normal.  SYMPTOMS TO WATCH FOR AND REPORT TO YOUR PHYSICIAN:  1. Abdominal pain or bloating, other than gas cramps.  2. Chest pain.  3. Back pain.  4. Signs of infection such as: chills or fever occurring within 24 hours   after the procedure.  5. Rectal bleeding, which would show as bright red, maroon, or black stools.   (A tablespoon of blood from the rectum is not serious, especially  if   hemorrhoids are present.)  6. Vomiting.  7. Weakness or dizziness.  GO DIRECTLY TO THE NEAREST EMERGENCY ROOM IF YOU HAVE ANY OF THE FOLLOWING:      Difficulty breathing              Chills and/or fever over 101 F   Persistent vomiting and/or vomiting blood   Severe abdominal pain   Severe chest pain   Black, tarry stools   Bleeding- more than one tablespoon   Any other symptom or condition that you feel may need urgent attention  Your doctor recommends these additional instructions:  If any biopsies were taken, your doctors clinic will contact you in 1 to 2   weeks with any results.  Follow an antireflux regimen.  This includes:       - Do not lie down for at least 3 to 4 hours after meals.        - Raise the head of the bed 4 to 6 inches.        - Decrease excess weight.        - Avoid citrus juices and other acidic foods, alcohol, chocolate, mints,   coffee and other caffeinated beverages, carbonated beverages, fatty and   fried foods.        - Avoid tight-fitting clothing.        - Avoid cigarettes and other tobacco products.   Take Protonix (pantoprazole) 40 mg by mouth once a day.   Your physician has recommended an Upper GI series (Xray).   Your physician has recommended a repeat upper endoscopy as needed for   retreatment.  For questions, problems or results please call your physician - Alfonso Cummings MD at Work:  [phone].  EMERGENCY PHONE NUMBER: [phone], LAB RESULTS: [phone]  IF A COMPLICATION OR EMERGENCY SITUATION ARISES AND YOU ARE UNABLE TO REACH   YOUR PHYSICIAN - GO DIRECTLY TO THE EMERGENCY ROOM.  ___________________________________________  Nurse Signature  ___________________________________________  Patient/Designated Responsible Party Signature  Alfonso Cummings MD  2/8/2024 11:43:30 AM  This report has been verified and signed electronically.  Dear patient,  As a result of recent federal legislation (The Federal Cures Act), you may   receive lab or pathology  results from your procedure in your [facility]   account before your physician is able to contact you. Your physician or   their representative will relay the results to you with their   recommendations at their soonest availability.  Thank you.  PROVATION

## 2024-02-08 NOTE — TRANSFER OF CARE
"Anesthesia Transfer of Care Note    Patient: Edy Bartlett III    Procedure(s) Performed: Procedure(s) (LRB):  ESOPHAGOGASTRODUODENOSCOPY (EGD) (N/A)    Patient location: PACU    Anesthesia Type: general    Transport from OR: Transported from OR on room air with adequate spontaneous ventilation    Post pain: adequate analgesia    Post assessment: no apparent anesthetic complications and tolerated procedure well    Post vital signs: stable    Level of consciousness: responds to stimulation    Nausea/Vomiting: no nausea/vomiting    Complications: none    Transfer of care protocol was followed      Last vitals: Visit Vitals  /76 (BP Location: Right arm, Patient Position: Lying)   Pulse 67   Temp 36.6 °C (97.8 °F)   Resp 14   Ht 5' 8" (1.727 m)   Wt 77.1 kg (170 lb)   SpO2 98%   BMI 25.85 kg/m²     "

## 2024-02-08 NOTE — H&P
History & Physical - Short Stay  Gastroenterology      SUBJECTIVE:     Procedure: Gastroscopy    Chief Complaint/Indication for Procedure:  Dysphagia (ever since his Carotid surgery 2 months ago).  Hx of XRT for throat cancer (9-10 yrs ago).    History of Present Illness:  See recent PCP's ov note:  Office Visit   1/31/2024  [facility]       Huy Cordova MD  Family Medicine Mixed hyperlipidemia +8 more  Dx Follow-up   Reason for Visit     Progress Notes    Huy Cordova MD at 1/31/2024  2:20 PM    Status: Signed   Expand All Collapse All  [facility]   [address] [facility]         Patient ID: Edy Bartlett III      Chief Complaint:        Chief Complaint   Patient presents with    Follow-up       6 month             HPI:  Routine follow-up.  He needs me to complete some paperwork so he can continue to get disability secondary to the stroke he had in 2020 causing left upper extremity weakness.  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  I completed the forms for him.  Blood pressure looks to be well-controlled.  He is due for labs which we will get today.  He recently had a left carotid endarterectomy that went well but he has been left with some postsurgical pain.  He takes hydrocodone possibly once per day I am requests a refill so I have given that to him.  I do recommend he get a shingles shot from his local pharmacy at his earliest convenience.  I did send a message to his Vascular Surgeon to find out when I can pause his aspirin Plavix so that we can get a colonoscopy.  He does complain of dysphagia if he eats too much.  Specifically he will eat a volume of food and if it is too much whatever he eats and drinks will come out of his mouth whenever he bends over.  I do wonder if he is got some esophageal strictures so I placed a case request for an EGD.     Review of Systems        Dysphagia     Assessment:           Plan:       Edy Bartlett III  was seen today for follow-up and may need lab work.     Diagnoses and all orders for this visit:     Edy was seen today for follow-up.     Diagnoses and all orders for this visit:     Mixed hyperlipidemia  -     Comprehensive Metabolic Panel; Future  -     Lipid Panel; Future  Check labs      History of CVA (cerebrovascular accident)  -     Lipid Panel; Future  Check labs       Impaired fasting blood sugar  -     Hemoglobin A1C; Future  Check labs       Prostate cancer screening  -     PSA, Screening; Future  Check labs    Previously elevated at 11     Esophageal dysphagia  -     Case Request Endoscopy: ESOPHAGOGASTRODUODENOSCOPY (EGD)     Essential hypertension  Controlled with med      Hemiplegia and hemiparesis following cerebral infarction affecting right dominant side  Stable     Cervicalgia  -     HYDROcodone-acetaminophen (NORCO) 5-325 mg per tablet; Take 1 tablet by mouth every 24 hours as needed for Pain.  Monitor      Bilateral sensorineural hearing loss  Needs hearing aids                No medications prior to admission.       Review of patient's allergies indicates:   Allergen Reactions    Statins-hmg-coa reductase inhibitors Other (See Comments)     Not allergy, makes him feel awful        Past Medical History:   Diagnosis Date    BPH (benign prostatic hypertrophy) with urinary obstruction     Cerebellar stroke     Cerebrovascular accident (CVA) due to occlusion of left cerebellar artery 05/08/2020    Complication of anesthesia     gets very sore neck post-op if his head is twisted or bent too far with intubation    DDD (degenerative disc disease)     DDD (degenerative disc disease), cervical     DDD (degenerative disc disease), lumbar     DJD (degenerative joint disease) of knee 12/10/2012    ED (erectile dysfunction)     Elevated PSA     Hyperlipidemia     Hypertension     Mass of skin 2012    left temple, possible lipoma     "Mobility impaired     cane    Neuropathy     feet    OA (osteoarthritis of spine)     Throat cancer     squamous cell - per Dr. Austin    Unspecified sensorineural hearing loss     Left Ear     Past Surgical History:   Procedure Laterality Date    APPENDECTOMY      CAROTID ENDARTERECTOMY Left 11/14/2023    Procedure: ENDARTERECTOMY-CAROTID;  Surgeon: Jules Brown MD;  [facility];  Service: Peripheral Vascular;  Laterality: Left;    CARPAL TUNNEL RELEASE      both hands/ lt hand x 2/ rt 1    CERVICAL SPINE SURGERY      x 2;fusion C3-7, can only bend his neck a little    EPIDURAL BLOCK INJECTION      injection for pain    FRACTURE SURGERY      ankle    hematoma removed  2006    thigh    HERNIA REPAIR      R inguinal    KNEE ARTHROSCOPY      PROSTATE BIOPSY      TENDON REPAIR      TRIGGER FINGER RELEASE  2007    left    TUMOR EXCISION      fatty tumor to left front of head, has since grown back    UNICOMPARTMENTAL ARTHROPLASTY OF KNEE Left 9/10/2019    Procedure: ARTHROPLASTY, KNEE, UNICOMPARTMENTAL;  Surgeon: Nelson Silva MD;  Location: [facility] OR;  Service: Orthopedics;  Laterality: Left;    urolift       Family History   Problem Relation Age of Onset    Hyperlipidemia Mother     Heart disease Mother     Prostate cancer Brother     Diabetes Son     Diabetes type II Maternal Aunt      Social History     Tobacco Use    Smoking status: Never    Smokeless tobacco: Never   Substance Use Topics    Alcohol use: No    Drug use: No         OBJECTIVE:     Vital Signs (Most Recent)       Physical Exam:  : Ht: 5' 8" (172.7 cm)   Wt: 77.1 kg (170 lb)   BMI: 25.85 kg/m²  .                                                       GENERAL:  Comfortable, in no acute distress.                                 HEENT EXAM:  Nonicteric.  No adenopathy.  Oropharynx is clear.               NECK:  Supple.                                                               LUNGS:  Clear.                                                "                CARDIAC:  Regular rate and rhythm.  S1, S2.  No murmur.                      ABDOMEN:  Soft, positive bowel sounds, nontender.  No hepatosplenomegaly or masses.  No rebound or guarding.                                             EXTREMITIES:  No edema.     MENTAL STATUS:  Alert and oriented.    ASSESSMENT/PLAN:     Assessment: Dysphagia (ever since his Carotid surgery 2 months ago).  Hx of XRT for throat cancer (9-10 yrs ago).    Plan: Gastroscopy    Anesthesia Plan:   MAC / General Anaesthesia    ASA Grade: ASA 2 - Patient with mild systemic disease with no functional limitations    MALLAMPATI SCORE: I (soft palate, uvula, fauces, and tonsillar pillars visible)

## 2024-02-14 LAB
FINAL PATHOLOGIC DIAGNOSIS: NORMAL
GROSS: NORMAL
Lab: NORMAL

## 2024-02-15 ENCOUNTER — TELEPHONE (OUTPATIENT)
Dept: GASTROENTEROLOGY | Facility: CLINIC | Age: 69
End: 2024-02-15
Payer: MEDICARE

## 2024-02-16 ENCOUNTER — TELEPHONE (OUTPATIENT)
Dept: GASTROENTEROLOGY | Facility: CLINIC | Age: 69
End: 2024-02-16
Payer: MEDICARE

## 2024-02-16 NOTE — TELEPHONE ENCOUNTER
----- Message from Hilaria Bhatti sent at 2/16/2024 11:25 AM CST -----  Contact: self  Type: Needs Medical Advice  Who Called:  pt  Best Call Back Number: [phone]   Additional Information: please call pt would like to know what to expect at his appt on 3/19

## 2024-02-16 NOTE — TELEPHONE ENCOUNTER
Returned call to pt. Pt states he talked to someone yesterday and scheduled a procedure, but that he didn't know what stating it wasn't explained to him. Discussed scheduled UGI with esophagram with pt. Pt verbalized understanding and appreciation to all

## 2024-03-13 ENCOUNTER — OFFICE VISIT (OUTPATIENT)
Dept: ORTHOPEDICS | Facility: CLINIC | Age: 69
End: 2024-03-13
Payer: MEDICARE

## 2024-03-13 VITALS — BODY MASS INDEX: 25.76 KG/M2 | HEIGHT: 68 IN | WEIGHT: 170 LBS

## 2024-03-13 DIAGNOSIS — M17.11 PRIMARY OSTEOARTHRITIS OF RIGHT KNEE: Primary | ICD-10-CM

## 2024-03-13 PROCEDURE — 4010F ACE/ARB THERAPY RXD/TAKEN: CPT | Mod: CPTII,S$GLB,, | Performed by: ORTHOPAEDIC SURGERY

## 2024-03-13 PROCEDURE — 99214 OFFICE O/P EST MOD 30 MIN: CPT | Mod: 25,S$GLB,, | Performed by: ORTHOPAEDIC SURGERY

## 2024-03-13 PROCEDURE — 3288F FALL RISK ASSESSMENT DOCD: CPT | Mod: CPTII,S$GLB,, | Performed by: ORTHOPAEDIC SURGERY

## 2024-03-13 PROCEDURE — 1101F PT FALLS ASSESS-DOCD LE1/YR: CPT | Mod: CPTII,S$GLB,, | Performed by: ORTHOPAEDIC SURGERY

## 2024-03-13 PROCEDURE — 3044F HG A1C LEVEL LT 7.0%: CPT | Mod: CPTII,S$GLB,, | Performed by: ORTHOPAEDIC SURGERY

## 2024-03-13 PROCEDURE — 3008F BODY MASS INDEX DOCD: CPT | Mod: CPTII,S$GLB,, | Performed by: ORTHOPAEDIC SURGERY

## 2024-03-13 PROCEDURE — 1159F MED LIST DOCD IN RCRD: CPT | Mod: CPTII,S$GLB,, | Performed by: ORTHOPAEDIC SURGERY

## 2024-03-13 PROCEDURE — 1125F AMNT PAIN NOTED PAIN PRSNT: CPT | Mod: CPTII,S$GLB,, | Performed by: ORTHOPAEDIC SURGERY

## 2024-03-13 PROCEDURE — 99999 PR PBB SHADOW E&M-EST. PATIENT-LVL II: CPT | Mod: PBBFAC,,, | Performed by: ORTHOPAEDIC SURGERY

## 2024-03-13 PROCEDURE — 20610 DRAIN/INJ JOINT/BURSA W/O US: CPT | Mod: RT,S$GLB,, | Performed by: ORTHOPAEDIC SURGERY

## 2024-03-13 RX ORDER — TRIAMCINOLONE ACETONIDE 40 MG/ML
40 INJECTION, SUSPENSION INTRA-ARTICULAR; INTRAMUSCULAR
Status: DISCONTINUED | OUTPATIENT
Start: 2024-03-13 | End: 2024-03-13 | Stop reason: HOSPADM

## 2024-03-13 RX ADMIN — TRIAMCINOLONE ACETONIDE 40 MG: 40 INJECTION, SUSPENSION INTRA-ARTICULAR; INTRAMUSCULAR at 09:03

## 2024-03-13 NOTE — PROCEDURES
Large Joint Aspiration/Injection: R knee    Date/Time: 3/13/2024 9:00 AM    Performed by: Nelson Silva MD  Authorized by: Nelson Silva MD    Consent Done?:  Yes (Verbal)  Timeout: prior to procedure the correct patient, procedure, and site was verified    Prep: patient was prepped and draped in usual sterile fashion      Details:  Needle Size:  21 G  Approach:  Anterolateral  Location:  Knee  Site:  R knee  Medications:  40 mg triamcinolone acetonide 40 mg/mL  Patient tolerance:  Patient tolerated the procedure well with no immediate complications

## 2024-03-13 NOTE — PROGRESS NOTES
68 years old recurrence of right knee pain.  We would given him injection with Kenalog about 7 months ago good response requesting repeat injection also interested in the possibility of arthroplasty which performed of the left knee UKA, did well with     Exam shows tenderness the joint line no signs infection instability skin intact compartments soft     X-rays show arthritic changes    Assessment: Right knee arthrosis     Plan: Kenalog injection right knee, we did discuss with him the possibility of knee arthroplasty unicompartmental knee arthroplasty, follow-up as needed

## 2024-04-11 ENCOUNTER — TELEPHONE (OUTPATIENT)
Dept: OTOLARYNGOLOGY | Facility: CLINIC | Age: 69
End: 2024-04-11
Payer: MEDICARE

## 2024-04-11 NOTE — TELEPHONE ENCOUNTER
----- Message from Concha Carson sent at 4/11/2024  9:41 AM CDT -----  Contact: self  Type:  Needs Medical Advice    Who Called: self  Symptoms (please be specific): pt sts he needs rik ppt because he has blood coming out his ear and an ear ache.   Would the patient rather a call back or a response via [facility]? call  Best Call Back Number: [phone] (home)     Additional Information: please advise and thank you.

## 2024-04-29 ENCOUNTER — OFFICE VISIT (OUTPATIENT)
Dept: FAMILY MEDICINE | Facility: CLINIC | Age: 69
End: 2024-04-29
Payer: MEDICARE

## 2024-04-29 VITALS
SYSTOLIC BLOOD PRESSURE: 128 MMHG | OXYGEN SATURATION: 97 % | DIASTOLIC BLOOD PRESSURE: 68 MMHG | BODY MASS INDEX: 26.13 KG/M2 | HEART RATE: 69 BPM | HEIGHT: 68 IN | WEIGHT: 172.38 LBS

## 2024-04-29 DIAGNOSIS — H66.001 NON-RECURRENT ACUTE SUPPURATIVE OTITIS MEDIA OF RIGHT EAR WITHOUT SPONTANEOUS RUPTURE OF TYMPANIC MEMBRANE: ICD-10-CM

## 2024-04-29 DIAGNOSIS — Z12.11 COLON CANCER SCREENING: ICD-10-CM

## 2024-04-29 DIAGNOSIS — I69.351 HEMIPLEGIA AND HEMIPARESIS FOLLOWING CEREBRAL INFARCTION AFFECTING RIGHT DOMINANT SIDE: ICD-10-CM

## 2024-04-29 DIAGNOSIS — H90.3 BILATERAL SENSORINEURAL HEARING LOSS: ICD-10-CM

## 2024-04-29 DIAGNOSIS — I10 ESSENTIAL HYPERTENSION: Primary | ICD-10-CM

## 2024-04-29 DIAGNOSIS — R97.20 ELEVATED PSA, BETWEEN 10 AND LESS THAN 20 NG/ML: ICD-10-CM

## 2024-04-29 DIAGNOSIS — E78.2 MIXED HYPERLIPIDEMIA: ICD-10-CM

## 2024-04-29 DIAGNOSIS — R13.19 ESOPHAGEAL DYSPHAGIA: ICD-10-CM

## 2024-04-29 DIAGNOSIS — M54.2 CERVICALGIA: ICD-10-CM

## 2024-04-29 PROBLEM — R29.6 FREQUENT FALLS: Status: RESOLVED | Noted: 2020-08-14 | Resolved: 2024-04-29

## 2024-04-29 PROBLEM — I65.29 CAROTID STENOSIS: Status: RESOLVED | Noted: 2023-11-14 | Resolved: 2024-04-29

## 2024-04-29 PROBLEM — R05.8 UPPER AIRWAY COUGH SYNDROME: Status: RESOLVED | Noted: 2020-03-27 | Resolved: 2024-04-29

## 2024-04-29 PROCEDURE — 1125F AMNT PAIN NOTED PAIN PRSNT: CPT | Mod: CPTII,S$GLB,, | Performed by: INTERNAL MEDICINE

## 2024-04-29 PROCEDURE — 1101F PT FALLS ASSESS-DOCD LE1/YR: CPT | Mod: CPTII,S$GLB,, | Performed by: INTERNAL MEDICINE

## 2024-04-29 PROCEDURE — 3078F DIAST BP <80 MM HG: CPT | Mod: CPTII,S$GLB,, | Performed by: INTERNAL MEDICINE

## 2024-04-29 PROCEDURE — 1160F RVW MEDS BY RX/DR IN RCRD: CPT | Mod: CPTII,S$GLB,, | Performed by: INTERNAL MEDICINE

## 2024-04-29 PROCEDURE — G2211 COMPLEX E/M VISIT ADD ON: HCPCS | Mod: S$GLB,,, | Performed by: INTERNAL MEDICINE

## 2024-04-29 PROCEDURE — 3008F BODY MASS INDEX DOCD: CPT | Mod: CPTII,S$GLB,, | Performed by: INTERNAL MEDICINE

## 2024-04-29 PROCEDURE — 3074F SYST BP LT 130 MM HG: CPT | Mod: CPTII,S$GLB,, | Performed by: INTERNAL MEDICINE

## 2024-04-29 PROCEDURE — 3044F HG A1C LEVEL LT 7.0%: CPT | Mod: CPTII,S$GLB,, | Performed by: INTERNAL MEDICINE

## 2024-04-29 PROCEDURE — 4010F ACE/ARB THERAPY RXD/TAKEN: CPT | Mod: CPTII,S$GLB,, | Performed by: INTERNAL MEDICINE

## 2024-04-29 PROCEDURE — 99214 OFFICE O/P EST MOD 30 MIN: CPT | Mod: S$GLB,,, | Performed by: INTERNAL MEDICINE

## 2024-04-29 PROCEDURE — 99999 PR PBB SHADOW E&M-EST. PATIENT-LVL III: CPT | Mod: PBBFAC,,, | Performed by: INTERNAL MEDICINE

## 2024-04-29 PROCEDURE — 3288F FALL RISK ASSESSMENT DOCD: CPT | Mod: CPTII,S$GLB,, | Performed by: INTERNAL MEDICINE

## 2024-04-29 PROCEDURE — 1159F MED LIST DOCD IN RCRD: CPT | Mod: CPTII,S$GLB,, | Performed by: INTERNAL MEDICINE

## 2024-04-29 RX ORDER — HYDROCODONE BITARTRATE AND ACETAMINOPHEN 5; 325 MG/1; MG/1
1 TABLET ORAL
Qty: 30 TABLET | Refills: 0 | Status: SHIPPED | OUTPATIENT
Start: 2024-04-29

## 2024-04-29 RX ORDER — AMOXICILLIN 875 MG/1
875 TABLET, FILM COATED ORAL 2 TIMES DAILY
COMMUNITY
Start: 2024-04-25 | End: 2024-05-02

## 2024-04-29 RX ORDER — OFLOXACIN 3 MG/ML
5 SOLUTION AURICULAR (OTIC)
COMMUNITY
Start: 2024-04-25 | End: 2024-05-02

## 2024-04-29 NOTE — PROGRESS NOTES
"[facility]   [address]       Patient ID: Edy Bartlett III     Chief Complaint:   Chief Complaint   Patient presents with    Annual Exam     General wellness exam        HPI: Routine Follow up. He was recently seen at  for Left ear pain, diagnosed with Left otitis media and otitis externa, and given Amoxicillin and Ofloxacin drops.  He does request a refill on hydrocodone that he takes for neck pain and I will be happy to prescribe that for him since he does not take many every 3 months.  He is due for colon cancer screening and we will attempt to do a fit kit since we can not pause the aspirin and Plavix right now.  Left  Since our last office visit, he did have an EGD which yielded no real pathology.  Modified barium swallow study was recommended do this odd history of food regurgitating when he bends over but he recently  from his wife so he would like to not do that right now. LDL is higher than I like, but he's on Praulent. PSA high but stable- refer to Urology.     Review of Systems       Left ear pain     Objective:      Physical Exam   Physical Exam       Left otitis media     Vitals:   Vitals:    04/29/24 1359   BP: 128/68   Pulse: 69   SpO2: 97%   Weight: 78.2 kg (172 lb 6.4 oz)   Height: 5' 8" (1.727 m)        Assessment:           Plan:       Edy Bartlett III  was seen today for follow-up and may need lab work.    Diagnoses and all orders for this visit:    Edy was seen today for annual exam.    Diagnoses and all orders for this visit:    Essential hypertension  Controlled with med     Cervicalgia  -     HYDROcodone-acetaminophen (NORCO) 5-325 mg per tablet; Take 1 tablet by mouth every 24 hours as needed for Pain.  Controlled with med as needed     Colon cancer screening  -     Fecal Immunochemical Test (iFOBT); Future    Hemiplegia and hemiparesis following cerebral infarction affecting right dominant side  Stable     Bilateral sensorineural " hearing loss  Will get evaluated for hearing aids     Mixed hyperlipidemia  LDL almost Controlled with med     Esophageal dysphagia  Consider MBSS later     Non-recurrent acute suppurative otitis media of right ear without spontaneous rupture of tympanic membrane  Finish Amoxicillin and Ofloxacin     Elevated PSA, between 10 and less than 20 ng/ml  -     Ambulatory referral/consult to Urology; Future  Refer to Dr. Mckeon     Visit today included increased complexity associated with the care of the episodic problem hypertension and elevated PSA addressed and managing the longitudinal care of the patient due to the serious and/or complex managed problem(s) .      Huy Cordova MD

## 2024-05-07 ENCOUNTER — TELEPHONE (OUTPATIENT)
Dept: OTOLARYNGOLOGY | Facility: CLINIC | Age: 69
End: 2024-05-07
Payer: MEDICARE

## 2024-05-07 NOTE — TELEPHONE ENCOUNTER
----- Message from Ioana Watts sent at 5/7/2024  7:39 AM CDT -----  Contact: Self  Type:  Sooner Appointment Request    Caller is requesting a sooner appointment.  Caller declined first available appointment listed below.  Caller will not accept being placed on the waitlist and is requesting a message be sent to doctor.    Name of Caller:  Self  When is the first available appointment?  5/18  Symptoms:  ear infection aching down to jaw  Would the patient rather a call back or a response via [facility]? call  Best Call Back Number:  [phone]  Additional Information:   Please call the patient back at the phone number listed above to advise. Thank you!

## 2024-05-08 ENCOUNTER — OFFICE VISIT (OUTPATIENT)
Dept: OTOLARYNGOLOGY | Facility: CLINIC | Age: 69
End: 2024-05-08
Payer: MEDICARE

## 2024-05-08 VITALS — HEIGHT: 68 IN | BODY MASS INDEX: 26.19 KG/M2 | WEIGHT: 172.81 LBS

## 2024-05-08 DIAGNOSIS — H60.312 CHRONIC DIFFUSE OTITIS EXTERNA OF LEFT EAR: Primary | ICD-10-CM

## 2024-05-08 DIAGNOSIS — H61.23 BILATERAL IMPACTED CERUMEN: ICD-10-CM

## 2024-05-08 DIAGNOSIS — H91.93 BILATERAL HEARING LOSS, UNSPECIFIED HEARING LOSS TYPE: ICD-10-CM

## 2024-05-08 DIAGNOSIS — H93.13 TINNITUS, BILATERAL: ICD-10-CM

## 2024-05-08 PROCEDURE — 87077 CULTURE AEROBIC IDENTIFY: CPT | Performed by: NURSE PRACTITIONER

## 2024-05-08 PROCEDURE — 3008F BODY MASS INDEX DOCD: CPT | Mod: CPTII,S$GLB,, | Performed by: NURSE PRACTITIONER

## 2024-05-08 PROCEDURE — 1159F MED LIST DOCD IN RCRD: CPT | Mod: CPTII,S$GLB,, | Performed by: NURSE PRACTITIONER

## 2024-05-08 PROCEDURE — 1101F PT FALLS ASSESS-DOCD LE1/YR: CPT | Mod: CPTII,S$GLB,, | Performed by: NURSE PRACTITIONER

## 2024-05-08 PROCEDURE — 3044F HG A1C LEVEL LT 7.0%: CPT | Mod: CPTII,S$GLB,, | Performed by: NURSE PRACTITIONER

## 2024-05-08 PROCEDURE — 99999 PR PBB SHADOW E&M-EST. PATIENT-LVL III: CPT | Mod: PBBFAC,,, | Performed by: NURSE PRACTITIONER

## 2024-05-08 PROCEDURE — 4010F ACE/ARB THERAPY RXD/TAKEN: CPT | Mod: CPTII,S$GLB,, | Performed by: NURSE PRACTITIONER

## 2024-05-08 PROCEDURE — 99214 OFFICE O/P EST MOD 30 MIN: CPT | Mod: 25,S$GLB,, | Performed by: NURSE PRACTITIONER

## 2024-05-08 PROCEDURE — 69210 REMOVE IMPACTED EAR WAX UNI: CPT | Mod: S$GLB,,, | Performed by: NURSE PRACTITIONER

## 2024-05-08 PROCEDURE — 87186 SC STD MICRODIL/AGAR DIL: CPT | Performed by: NURSE PRACTITIONER

## 2024-05-08 PROCEDURE — 1126F AMNT PAIN NOTED NONE PRSNT: CPT | Mod: CPTII,S$GLB,, | Performed by: NURSE PRACTITIONER

## 2024-05-08 PROCEDURE — 87070 CULTURE OTHR SPECIMN AEROBIC: CPT | Performed by: NURSE PRACTITIONER

## 2024-05-08 PROCEDURE — 3288F FALL RISK ASSESSMENT DOCD: CPT | Mod: CPTII,S$GLB,, | Performed by: NURSE PRACTITIONER

## 2024-05-08 RX ORDER — ACETAMINOPHEN 160 MG/1
160 BAR, CHEWABLE ORAL EVERY 4 HOURS PRN
COMMUNITY
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
## 2024-05-12 LAB
BACTERIA SPEC AEROBE CULT: ABNORMAL
BACTERIA SPEC AEROBE CULT: ABNORMAL

## 2024-05-14 ENCOUNTER — TELEPHONE (OUTPATIENT)
Dept: OTOLARYNGOLOGY | Facility: CLINIC | Age: 69
End: 2024-05-14
Payer: MEDICARE

## 2024-05-14 DIAGNOSIS — H60.312 ACUTE DIFFUSE OTITIS EXTERNA OF LEFT EAR: Primary | ICD-10-CM

## 2024-05-14 RX ORDER — CIPROFLOXACIN AND DEXAMETHASONE 3; 1 MG/ML; MG/ML
4 SUSPENSION/ DROPS AURICULAR (OTIC) 2 TIMES DAILY
Qty: 7.5 ML | Refills: 0 | Status: SHIPPED | OUTPATIENT
Start: 2024-05-14 | End: 2024-05-24

## 2024-05-14 NOTE — TELEPHONE ENCOUNTER
----- Message from Marimar Jimenez NP sent at 5/14/2024  6:58 AM CDT -----  Ciprofloxacin antibiotic ear drops sent to his pharmacy. Twice daily for 10 days.

## 2024-05-22 ENCOUNTER — OFFICE VISIT (OUTPATIENT)
Dept: OTOLARYNGOLOGY | Facility: CLINIC | Age: 69
End: 2024-05-22
Payer: MEDICARE

## 2024-05-22 VITALS — WEIGHT: 172.81 LBS | HEIGHT: 68 IN | BODY MASS INDEX: 26.19 KG/M2

## 2024-05-22 DIAGNOSIS — H91.93 BILATERAL HEARING LOSS, UNSPECIFIED HEARING LOSS TYPE: ICD-10-CM

## 2024-05-22 DIAGNOSIS — H93.13 TINNITUS, BILATERAL: ICD-10-CM

## 2024-05-22 DIAGNOSIS — H60.312 CHRONIC DIFFUSE OTITIS EXTERNA OF LEFT EAR: Primary | ICD-10-CM

## 2024-05-22 PROCEDURE — 1126F AMNT PAIN NOTED NONE PRSNT: CPT | Mod: CPTII,S$GLB,, | Performed by: NURSE PRACTITIONER

## 2024-05-22 PROCEDURE — 3008F BODY MASS INDEX DOCD: CPT | Mod: CPTII,S$GLB,, | Performed by: NURSE PRACTITIONER

## 2024-05-22 PROCEDURE — 99070 SPECIAL SUPPLIES PHYS/QHP: CPT | Mod: S$GLB,,, | Performed by: NURSE PRACTITIONER

## 2024-05-22 PROCEDURE — 1159F MED LIST DOCD IN RCRD: CPT | Mod: CPTII,S$GLB,, | Performed by: NURSE PRACTITIONER

## 2024-05-22 PROCEDURE — 3288F FALL RISK ASSESSMENT DOCD: CPT | Mod: CPTII,S$GLB,, | Performed by: NURSE PRACTITIONER

## 2024-05-22 PROCEDURE — 4010F ACE/ARB THERAPY RXD/TAKEN: CPT | Mod: CPTII,S$GLB,, | Performed by: NURSE PRACTITIONER

## 2024-05-22 PROCEDURE — 99999 PR PBB SHADOW E&M-EST. PATIENT-LVL II: CPT | Mod: PBBFAC,,, | Performed by: NURSE PRACTITIONER

## 2024-05-22 PROCEDURE — 1101F PT FALLS ASSESS-DOCD LE1/YR: CPT | Mod: CPTII,S$GLB,, | Performed by: NURSE PRACTITIONER

## 2024-05-22 PROCEDURE — 99213 OFFICE O/P EST LOW 20 MIN: CPT | Mod: 25,S$GLB,, | Performed by: NURSE PRACTITIONER

## 2024-05-22 PROCEDURE — 3044F HG A1C LEVEL LT 7.0%: CPT | Mod: CPTII,S$GLB,, | Performed by: NURSE PRACTITIONER
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
## 2024-06-18 DIAGNOSIS — M25.551 RIGHT HIP PAIN: Primary | ICD-10-CM

## 2024-06-19 ENCOUNTER — HOSPITAL ENCOUNTER (OUTPATIENT)
Dept: RADIOLOGY | Facility: HOSPITAL | Age: 69
Discharge: HOME | End: 2024-06-19
Attending: ORTHOPAEDIC SURGERY
Payer: MEDICARE

## 2024-06-19 ENCOUNTER — OFFICE VISIT (OUTPATIENT)
Dept: ORTHOPEDICS | Facility: CLINIC | Age: 69
End: 2024-06-19
Payer: MEDICARE

## 2024-06-19 VITALS — WEIGHT: 172.81 LBS | BODY MASS INDEX: 26.19 KG/M2 | HEIGHT: 68 IN

## 2024-06-19 DIAGNOSIS — M25.551 RIGHT HIP PAIN: ICD-10-CM

## 2024-06-19 DIAGNOSIS — M17.11 OSTEOARTHRITIS OF RIGHT KNEE, UNSPECIFIED OSTEOARTHRITIS TYPE: Primary | ICD-10-CM

## 2024-06-19 PROCEDURE — 99214 OFFICE O/P EST MOD 30 MIN: CPT | Mod: 25,S$GLB,, | Performed by: ORTHOPAEDIC SURGERY

## 2024-06-19 PROCEDURE — 3288F FALL RISK ASSESSMENT DOCD: CPT | Mod: CPTII,S$GLB,, | Performed by: ORTHOPAEDIC SURGERY

## 2024-06-19 PROCEDURE — 73502 X-RAY EXAM HIP UNI 2-3 VIEWS: CPT | Mod: 26,RT,, | Performed by: RADIOLOGY

## 2024-06-19 PROCEDURE — 3044F HG A1C LEVEL LT 7.0%: CPT | Mod: CPTII,S$GLB,, | Performed by: ORTHOPAEDIC SURGERY

## 2024-06-19 PROCEDURE — 73502 X-RAY EXAM HIP UNI 2-3 VIEWS: CPT | Mod: TC,PO,RT

## 2024-06-19 PROCEDURE — 3008F BODY MASS INDEX DOCD: CPT | Mod: CPTII,S$GLB,, | Performed by: ORTHOPAEDIC SURGERY

## 2024-06-19 PROCEDURE — 4010F ACE/ARB THERAPY RXD/TAKEN: CPT | Mod: CPTII,S$GLB,, | Performed by: ORTHOPAEDIC SURGERY

## 2024-06-19 PROCEDURE — 1100F PTFALLS ASSESS-DOCD GE2>/YR: CPT | Mod: CPTII,S$GLB,, | Performed by: ORTHOPAEDIC SURGERY

## 2024-06-19 PROCEDURE — 20610 DRAIN/INJ JOINT/BURSA W/O US: CPT | Mod: RT,S$GLB,, | Performed by: ORTHOPAEDIC SURGERY

## 2024-06-19 PROCEDURE — 1125F AMNT PAIN NOTED PAIN PRSNT: CPT | Mod: CPTII,S$GLB,, | Performed by: ORTHOPAEDIC SURGERY

## 2024-06-19 PROCEDURE — 99999 PR PBB SHADOW E&M-EST. PATIENT-LVL I: CPT | Mod: PBBFAC,,, | Performed by: ORTHOPAEDIC SURGERY

## 2024-06-19 RX ORDER — TRIAMCINOLONE ACETONIDE 40 MG/ML
40 INJECTION, SUSPENSION INTRA-ARTICULAR; INTRAMUSCULAR
Status: DISCONTINUED | OUTPATIENT
Start: 2024-06-19 | End: 2024-06-19 | Stop reason: HOSPADM

## 2024-06-19 RX ADMIN — TRIAMCINOLONE ACETONIDE 40 MG: 40 INJECTION, SUSPENSION INTRA-ARTICULAR; INTRAMUSCULAR at 11:06

## 2024-06-19 NOTE — PROCEDURES
Large Joint Aspiration/Injection: R knee    Date/Time: 6/19/2024 11:15 AM    Performed by: Nelson Silva MD  Authorized by: Nelson Silva MD    Consent Done?:  Yes (Verbal)  Timeout: prior to procedure the correct patient, procedure, and site was verified    Prep: patient was prepped and draped in usual sterile fashion      Details:  Needle Size:  21 G  Approach:  Anterolateral  Location:  Knee  Site:  R knee  Medications:  40 mg triamcinolone acetonide 40 mg/mL  Patient tolerance:  Patient tolerated the procedure well with no immediate complications
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
## 2024-07-02 ENCOUNTER — PATIENT MESSAGE (OUTPATIENT)
Dept: ORTHOPEDICS | Facility: CLINIC | Age: 69
End: 2024-07-02
Payer: MEDICARE

## 2024-07-02 DIAGNOSIS — M25.561 RIGHT KNEE PAIN: ICD-10-CM

## 2024-07-02 DIAGNOSIS — M17.11 OSTEOARTHRITIS OF RIGHT KNEE: Primary | ICD-10-CM

## 2024-07-02 RX ORDER — MUPIROCIN 20 MG/G
OINTMENT TOPICAL
OUTPATIENT
Start: 2024-07-02

## 2024-07-02 RX ORDER — CEFAZOLIN SODIUM 2 G/50ML
2 SOLUTION INTRAVENOUS
OUTPATIENT
Start: 2024-07-02

## 2024-07-23 DIAGNOSIS — Z01.812 ENCOUNTER FOR PRE-OPERATIVE LABORATORY TESTING: Primary | ICD-10-CM

## 2024-07-23 DIAGNOSIS — Z96.651 S/P RIGHT UNICOMPARTMENTAL KNEE REPLACEMENT: ICD-10-CM

## 2024-07-23 DIAGNOSIS — M17.11 OSTEOARTHRITIS OF RIGHT KNEE: ICD-10-CM

## 2024-07-23 DIAGNOSIS — M25.561 RIGHT KNEE PAIN: ICD-10-CM

## 2024-07-28 DIAGNOSIS — Z86.73 HISTORY OF CVA (CEREBROVASCULAR ACCIDENT): ICD-10-CM

## 2024-07-29 RX ORDER — CLOPIDOGREL BISULFATE 75 MG/1
75 TABLET ORAL
Qty: 90 TABLET | Refills: 1 | Status: SHIPPED | OUTPATIENT
Start: 2024-07-29

## 2024-07-31 ENCOUNTER — TELEPHONE (OUTPATIENT)
Dept: ORTHOPEDICS | Facility: CLINIC | Age: 69
End: 2024-07-31
Payer: MEDICARE

## 2024-07-31 NOTE — TELEPHONE ENCOUNTER
Contacted patient. Informed sx center patient canceled surgery. Having family issues at this time. Will call back to rescheduled.

## 2024-08-06 NOTE — TELEPHONE ENCOUNTER
----- Message from Mary Kate Gaitan sent at 7/31/2024  3:24 PM CDT -----  Regarding: Needs to cancel procedure  Type: Needs Medical Advice  Who Called:  Pt    Best Call Back Number: [phone]    Additional Information: Please call once surgery is cancelled   Quality 226: Preventive Care And Screening: Tobacco Use: Screening And Cessation Intervention: Patient screened for tobacco use and is an ex/non-smoker Detail Level: Detailed

## 2024-08-26 DIAGNOSIS — Z86.73 HISTORY OF CVA (CEREBROVASCULAR ACCIDENT): Primary | ICD-10-CM

## 2024-08-27 RX ORDER — ALIROCUMAB 75 MG/ML
75 INJECTION, SOLUTION SUBCUTANEOUS
Qty: 2 ML | Refills: 12 | Status: ACTIVE | OUTPATIENT
Start: 2024-08-27

## 2024-09-18 ENCOUNTER — OFFICE VISIT (OUTPATIENT)
Dept: OTOLARYNGOLOGY | Facility: CLINIC | Age: 69
End: 2024-09-18
Payer: MEDICARE

## 2024-09-18 VITALS — WEIGHT: 172.81 LBS | BODY MASS INDEX: 26.19 KG/M2 | HEIGHT: 68 IN

## 2024-09-18 DIAGNOSIS — H91.93 BILATERAL HEARING LOSS, UNSPECIFIED HEARING LOSS TYPE: ICD-10-CM

## 2024-09-18 DIAGNOSIS — H93.13 TINNITUS, BILATERAL: ICD-10-CM

## 2024-09-18 DIAGNOSIS — T66.XXXS LATE EFFECT OF RADIATION: ICD-10-CM

## 2024-09-18 DIAGNOSIS — H60.312 CHRONIC DIFFUSE OTITIS EXTERNA OF LEFT EAR: Primary | ICD-10-CM

## 2024-09-18 PROCEDURE — 3008F BODY MASS INDEX DOCD: CPT | Mod: CPTII,S$GLB,, | Performed by: NURSE PRACTITIONER

## 2024-09-18 PROCEDURE — 3288F FALL RISK ASSESSMENT DOCD: CPT | Mod: CPTII,S$GLB,, | Performed by: NURSE PRACTITIONER

## 2024-09-18 PROCEDURE — 3044F HG A1C LEVEL LT 7.0%: CPT | Mod: CPTII,S$GLB,, | Performed by: NURSE PRACTITIONER

## 2024-09-18 PROCEDURE — 1159F MED LIST DOCD IN RCRD: CPT | Mod: CPTII,S$GLB,, | Performed by: NURSE PRACTITIONER

## 2024-09-18 PROCEDURE — 1100F PTFALLS ASSESS-DOCD GE2>/YR: CPT | Mod: CPTII,S$GLB,, | Performed by: NURSE PRACTITIONER

## 2024-09-18 PROCEDURE — 69210 REMOVE IMPACTED EAR WAX UNI: CPT | Mod: S$GLB,,, | Performed by: NURSE PRACTITIONER

## 2024-09-18 PROCEDURE — 99213 OFFICE O/P EST LOW 20 MIN: CPT | Mod: 25,S$GLB,, | Performed by: NURSE PRACTITIONER

## 2024-09-18 PROCEDURE — 99999 PR PBB SHADOW E&M-EST. PATIENT-LVL III: CPT | Mod: PBBFAC,,, | Performed by: NURSE PRACTITIONER

## 2024-09-18 PROCEDURE — 4010F ACE/ARB THERAPY RXD/TAKEN: CPT | Mod: CPTII,S$GLB,, | Performed by: NURSE PRACTITIONER

## 2024-09-18 PROCEDURE — 1126F AMNT PAIN NOTED NONE PRSNT: CPT | Mod: CPTII,S$GLB,, | Performed by: NURSE PRACTITIONER

## 2024-09-18 RX ORDER — CIPROFLOXACIN AND DEXAMETHASONE 3; 1 MG/ML; MG/ML
4 SUSPENSION/ DROPS AURICULAR (OTIC) 2 TIMES DAILY
Qty: 7.5 ML | Refills: 0 | Status: SHIPPED | OUTPATIENT
Start: 2024-09-18 | End: 2024-09-28

## 2024-09-18 NOTE — PROGRESS NOTES
"Subjective:       Patient ID: Edy Bartlett III is a 69 y.o. male.    Chief Complaint: No chief complaint on file.    Ear Fullness        Patient seen 4 months ago for left otalgia. Culture grew (+)Proteus mirabilis and Corynebacterium amycolatum. Treated 1st with Gentian violet, then with Ciprodex gtts. Patient returns today for left ear concerns. Was itching so he took a Qtip and came out blackish-purple. [place] [facility] [place] told him to see ENT; they sent in ear drops which he did not  yet because he wanted me to look at it first.     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 Treated with radiation and chemo at [facility]. Patient states he is dealing with the after-effects of radiation burns to his left ear canal/jaw/teeth. Patient has area of exposed bone in left EAC secondary to radiation effects. His last audiogram here was 09/10/2020: Right type "A" with high-frequency SNHL. Left type "B" with mild to severe/profound mixed HL.          Review of Systems   Constitutional: Negative.    Eyes: Negative.    Cardiovascular: Negative.    Gastrointestinal: Negative.    Musculoskeletal: Negative.    Integumentary:  Negative.   Neurological: Negative.    Hematological: Negative.    Psychiatric/Behavioral: Negative.           Objective:      Physical Exam  Vitals and nursing note reviewed.   Constitutional:       General: He is not in acute distress.     Appearance: He is well-developed. He is not ill-appearing or diaphoretic.   HENT:      Head: Normocephalic and atraumatic.      Right Ear: Tympanic membrane, ear canal and external ear normal. Decreased hearing noted. No middle ear effusion. Tympanic membrane is not erythematous.      Left Ear: Ear canal and external ear normal. Decreased hearing noted. Tympanic membrane is scarred. Tympanic membrane is not erythematous. "      Nose: Nose normal.      Mouth/Throat:      Mouth: No oral lesions.      Dentition: Abnormal dentition. Dental caries present.      Tongue: No lesions.      Palate: No lesions.      Pharynx: Uvula midline. No posterior oropharyngeal erythema.      Tonsils: No tonsillar exudate.   Eyes:      General: Lids are normal. No scleral icterus.        Right eye: No discharge.         Left eye: No discharge.   Neck:      Trachea: Trachea normal. No tracheal deviation.   Cardiovascular:      Rate and Rhythm: Normal rate.   Pulmonary:      Effort: Pulmonary effort is normal. No respiratory distress.      Breath sounds: No stridor. No wheezing.   Musculoskeletal:         General: Normal range of motion.      Cervical back: Normal range of motion and neck supple.   Skin:     General: Skin is warm and dry.      Coloration: Skin is not pale.   Neurological:      Mental Status: He is alert and oriented to person, place, and time.      Coordination: Coordination normal.      Gait: Gait normal.   Psychiatric:         Speech: Speech normal.         Behavior: Behavior normal. Behavior is cooperative.         Thought Content: Thought content normal.         Judgment: Judgment normal.      SEPARATE PROCEDURE IN OFFICE:   Procedure: Removal of impacted cerumen, Left  Pre Procedure Diagnosis: Cerumen Impaction   Post Procedure Diagnosis: Cerumen Impaction   Verbal informed consent in regards to risk of trauma to ear canal, ear drum or hearing, discomfort during procedure and/or inability to remove cerumen impaction in one session or unforeseen events or complications.   No anesthesia.     Procedure in detail:   Ear canal visualized bilateral with appropriate size ear speculum utilizing Operating Head Binocular Otomicroscope   Utilizing the following: Suction cannula was used AS. The impacted cerumen of the ear canals was removed atraumatically. The TM and EAC were then inspected and found to be clear of wax. See description of TMs/EACs  in PE above.   Complications: No   Condition: Improved/Good    Assessment:     Left tympanosclerosis/opaque    Tinnitus AU (AS>AD)  Radiation effects to left EAC  Chronic left OE   Plan:     Residual Gentian violet remaining AS.   Ciprodex AS BID X 10 days  Continue to keep ears dry.   Audiogram at his convenience. Needs to consider hearing aids.     Return in 2-3 weeks for left ear recheck if not 100% resolved.

## 2024-09-24 ENCOUNTER — OFFICE VISIT (OUTPATIENT)
Dept: FAMILY MEDICINE | Facility: CLINIC | Age: 69
End: 2024-09-24
Payer: MEDICARE

## 2024-09-24 VITALS
OXYGEN SATURATION: 97 % | SYSTOLIC BLOOD PRESSURE: 136 MMHG | HEIGHT: 68 IN | BODY MASS INDEX: 25.79 KG/M2 | HEART RATE: 68 BPM | WEIGHT: 170.19 LBS | DIASTOLIC BLOOD PRESSURE: 90 MMHG

## 2024-09-24 DIAGNOSIS — I10 ESSENTIAL HYPERTENSION: Primary | ICD-10-CM

## 2024-09-24 DIAGNOSIS — R29.818 OTHER SYMPTOMS AND SIGNS INVOLVING THE NERVOUS SYSTEM: ICD-10-CM

## 2024-09-24 DIAGNOSIS — H53.9 VISUAL CHANGES: ICD-10-CM

## 2024-09-24 DIAGNOSIS — Z86.73 HISTORY OF CVA (CEREBROVASCULAR ACCIDENT): ICD-10-CM

## 2024-09-24 DIAGNOSIS — E78.2 MIXED HYPERLIPIDEMIA: ICD-10-CM

## 2024-09-24 LAB
OHS QRS DURATION: 102 MS
OHS QTC CALCULATION: 395 MS

## 2024-09-24 PROCEDURE — 3008F BODY MASS INDEX DOCD: CPT | Mod: CPTII,S$GLB,, | Performed by: NURSE PRACTITIONER

## 2024-09-24 PROCEDURE — 93005 ELECTROCARDIOGRAM TRACING: CPT | Mod: S$GLB,,, | Performed by: NURSE PRACTITIONER

## 2024-09-24 PROCEDURE — 3288F FALL RISK ASSESSMENT DOCD: CPT | Mod: CPTII,S$GLB,, | Performed by: NURSE PRACTITIONER

## 2024-09-24 PROCEDURE — 99214 OFFICE O/P EST MOD 30 MIN: CPT | Mod: S$GLB,,, | Performed by: NURSE PRACTITIONER

## 2024-09-24 PROCEDURE — 99999 PR PBB SHADOW E&M-EST. PATIENT-LVL III: CPT | Mod: PBBFAC,,, | Performed by: NURSE PRACTITIONER

## 2024-09-24 PROCEDURE — 1101F PT FALLS ASSESS-DOCD LE1/YR: CPT | Mod: CPTII,S$GLB,, | Performed by: NURSE PRACTITIONER

## 2024-09-24 PROCEDURE — 3044F HG A1C LEVEL LT 7.0%: CPT | Mod: CPTII,S$GLB,, | Performed by: NURSE PRACTITIONER

## 2024-09-24 PROCEDURE — 1125F AMNT PAIN NOTED PAIN PRSNT: CPT | Mod: CPTII,S$GLB,, | Performed by: NURSE PRACTITIONER

## 2024-09-24 PROCEDURE — 3075F SYST BP GE 130 - 139MM HG: CPT | Mod: CPTII,S$GLB,, | Performed by: NURSE PRACTITIONER

## 2024-09-24 PROCEDURE — 4010F ACE/ARB THERAPY RXD/TAKEN: CPT | Mod: CPTII,S$GLB,, | Performed by: NURSE PRACTITIONER

## 2024-09-24 PROCEDURE — 93010 ELECTROCARDIOGRAM REPORT: CPT | Mod: S$GLB,,, | Performed by: INTERNAL MEDICINE

## 2024-09-24 PROCEDURE — 3079F DIAST BP 80-89 MM HG: CPT | Mod: CPTII,S$GLB,, | Performed by: NURSE PRACTITIONER

## 2024-09-24 RX ORDER — CLOPIDOGREL BISULFATE 75 MG/1
75 TABLET ORAL DAILY
Qty: 90 TABLET | Refills: 1 | Status: SHIPPED | OUTPATIENT
Start: 2024-09-24

## 2024-09-24 RX ORDER — ASPIRIN 81 MG/1
81 TABLET ORAL DAILY
Qty: 90 TABLET | Refills: 3 | Status: SHIPPED | OUTPATIENT
Start: 2024-09-24

## 2024-09-24 RX ORDER — IBUPROFEN 200 MG
TABLET ORAL EVERY 6 HOURS PRN
COMMUNITY

## 2024-09-24 NOTE — PROGRESS NOTES
Patient ID: Edy Bartlett III is a 69 y.o. male.    Chief Complaint: Hypertension (Headache, dizzy, high BP. Took medication this morning )        Pt is new to me. PCP is Dr. Cordova.    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 Edy Bartlett is a pleasant 69-year old right hand male without smoking history with family history of CVA/TIA with father  due to stroke and personal history of multilevel degenerative disc disease with left sided cervical radiculopathy myelopathy due to cervical stenosis of spinal canal s/p cervical fusion C3-C7, left sided squamous cell carcinoma of nasopharynx s/p head & neck radiation with salivary gland dysfunction and functional dysphagia as late effects of neck/head radiation, severe dyslipidemia with statin intolerance, essential hypertension, benign prostatic hyperplasia with elevated prostate specific antigen (PSA), H/O asbestos exposure.    History of Present Illness    CHIEF COMPLAINT:  Doesn't feel right, vision changes    CANCER HISTORY AND RADIATION EFFECTS:  He reports a history of squamous cell carcinoma in the throat, treated with radiation to the head approximately 10 years ago. This has  "resulted in ongoing issues including impaired hearing, dizziness (particularly when moving or twisting his head), and memory problems. He describes difficulty understanding people, likening what he hears to be "Gaetano Mcfadden's mamgarret." He notes short-term memory issues and difficulty recalling names, which he attributes to the radiation affecting his brain.    VISION:  He reports experiencing fuzzy vision and gradual deterioration of eyesight, describing a sensation of his head being "in a cloud" when working in his shop this morning. He uses readers to see but this morning, felt like his vision had a drastic change. He feels like it is improving now.      STROKE HISTORY:  He has a history of stroke with previous right-sided weakness. He reports residual weakness but denies any new weakness. During the previous stroke event, he experienced complete loss of function on the right side. Currently, he maintains some residual weakness but has regained significant strength and function.    CARDIOVASCULAR HISTORY:  He reports a history of carotid artery procedure earlier this year due to 90% blockage, noting improvement after the intervention. He is unable to recall the specific physician or exact date, but mentions it occurred while he was still with his wife, indicating it was in the earlier part of the year.    MEDICATIONS:  He takes lisinopril for blood pressure every morning, including the day of the visit. He consistently uses the Praluent pen for cholesterol management, administering it approximately on the 1st and 15th of each month. He has been out of Plavix (clopidogrel) for about two weeks and is not currently taking baby aspirin. He expresses willingness to restart Plavix as recommended.    RECENT ILLNESS:  He reports a COVID-19 infection in early September, experiencing a severe headache and excessive sleepiness, noting that he slept for 2-3 days following the initial symptoms.    SOCIAL HISTORY:  He reports a " "recent divorce, with his wife leaving him on their anniversary. He currently lives with his 21-year-old stepdaughter, whom he considers his own child.    SUBSTANCE USE:  He denies current smoking and alcohol use. He reports past marijuana use on weekends during his youth, but states it has been over 40 years since last use. He has not used any illegal drugs since then.    ALLERGIES:  Statins      ROS:  General: -fever, -chills, -fatigue, -weight gain, -weight loss  Eyes:+vision changes, -redness, -discharge  ENT: -ear pain, -nasal congestion, -sore throat  Cardiovascular: -chest pain, -palpitations, -lower extremity edema  Respiratory: -cough, -shortness of breath  Gastrointestinal: -abdominal pain, -nausea, -vomiting, -diarrhea, -constipation, -blood in stool  Genitourinary: -dysuria, -hematuria, -frequency  Musculoskeletal: -joint pain, -muscle pain  Skin: -rash, -lesion  Neurological: +headache, +dizziness, -numbness, -tingling, -weakness  Psychiatric: -anxiety, -depression, -sleep difficulty           Vitals:    09/24/24 1057 09/24/24 1127   BP: (!) 158/82 (!) 136/90   Pulse: 68    SpO2: 97%    Weight: 77.2 kg (170 lb 3.1 oz)    Height: 5' 8" (1.727 m)       Vitals: BP: 136/90.  Physical Exam  Constitutional:       Appearance: Normal appearance. He is normal weight.   HENT:      Head: Normocephalic and atraumatic.      Right Ear: Tympanic membrane and external ear normal.      Ears:      Comments: Left TM purple, gentian violet noted to EAC     Nose: No congestion or rhinorrhea.   Eyes:      General: No visual field deficit.     Extraocular Movements: Extraocular movements intact.      Pupils: Pupils are equal, round, and reactive to light.   Cardiovascular:      Rate and Rhythm: Normal rate and regular rhythm.      Pulses: Normal pulses.      Heart sounds: Normal heart sounds.   Pulmonary:      Effort: Pulmonary effort is normal.      Breath sounds: Normal breath sounds.   Musculoskeletal:         General: No " swelling.      Cervical back: Neck supple.      Right lower leg: No edema.      Left lower leg: No edema.   Skin:     General: Skin is warm and dry.      Capillary Refill: Capillary refill takes less than 2 seconds.      Findings: Rash (folliculitis noted to chest) present.   Neurological:      Mental Status: He is alert and oriented to person, place, and time.      GCS: GCS eye subscore is 4. GCS verbal subscore is 5. GCS motor subscore is 6.      Cranial Nerves: Cranial nerve deficit (CN 2-12 intact except for VIII-impaired hearing) and facial asymmetry (slight left side of the mouth droop) present.      Motor: Weakness (Right sided weakness-mild) present. No seizure activity.      Coordination: Romberg sign negative. Coordination normal. Finger-Nose-Finger Test and Heel to Shin Test normal. Rapid alternating movements normal.           Assessment & Plan    Assessed patient with history of stroke, carotid artery disease, and prior head/neck radiation for cancer presenting with vision changes and dizziness  Performed neurological exam, noting residual right-sided weakness from previous stroke  Considered possibility of acute stroke given symptoms and history, but patient refused ED evaluation  Will order head CT to rule out acute intracranial process  Noted patient has been off Plavix for 2 weeks, increasing stroke risk  Performed EKG to evaluate cardiac rhythm  Rechecked blood pressure, noting improvement to 136/90  - Emphasized the importance of antiplatelet therapy in preventing future strokes.  - Prescribed Plavix (clopidogrel) and baby aspirin to restart anticoagulant medication.  - Ordered CT of head. Pt left prior to getting CT scan completed. Refused to wait. ED precautions advised.   - Instructed the patient to report any new or worsening neurological symptoms.    HYPERTENSION:  - Measured blood pressure at 136/90, showing improvement from earlier high reading.  - Discussed risks of untreated hypertension  and importance of medication adherence.  - Continued lisinopril for blood pressure management.  - Advised the patient to monitor blood pressure at home regularly.  - Instructed to report any consistently high readings or side effects from medication.    VISION CHANGES:  - Noted the patient's report of recent vision changes, describing fuzziness and impairment.  - Performed visual acuity and visual field tests.  - Ordered a CT of the head to investigate vision changes.  - Referred the patient to an ophthalmologist for comprehensive eye exam.    CAROTID ARTERY DISEASE:  - Noted the patient's history of carotid artery procedure for 90% blockage.  - Explained importance of antiplatelet therapy in preventing future strokes, especially given history of carotid artery disease.  - Emphasized the importance of restarting anticoagulant (Plavix) due to history of carotid procedure.    CHRONIC DIZZINESS:  - Noted the patient's report of chronic dizziness attributed to radiation treatment.  - Performed neurological exam to assess dizziness.  - Performed EKG to rule out cardiac causes.  - Advised on fall prevention strategies and safety measures at home.    HYPERLIPIDEMIA:  - Continued Praluent pen for cholesterol management, administered on 1st and 15th of each month.          1. Essential hypertension  IN OFFICE EKG 12-LEAD (to [facility])    COMPREHENSIVE METABOLIC PANEL    CBC W/ AUTO DIFFERENTIAL      2. History of CVA (cerebrovascular accident)  aspirin (ECOTRIN) 81 MG EC tablet    clopidogreL (PLAVIX) 75 mg tablet      3. Visual changes  Ambulatory referral/consult to Ophthalmology      4. Other symptoms and signs involving the nervous system  CT Head Without Contrast    IN OFFICE EKG 12-LEAD (to [facility])      5. Mixed hyperlipidemia              Follow up in about 1 month (around 10/24/2024) for PCP.    This note was generated with the assistance of ambient listening technology. Verbal consent was obtained by the patient and  accompanying visitor(s) for the recording of patient appointment to facilitate this note. I attest to having reviewed and edited the generated note for accuracy, though some syntax or spelling errors may persist. Please contact the author of this note for any clarification.

## 2024-10-11 DIAGNOSIS — M17.11 PRIMARY OSTEOARTHRITIS OF RIGHT KNEE: Primary | ICD-10-CM

## 2024-10-14 ENCOUNTER — OFFICE VISIT (OUTPATIENT)
Dept: ORTHOPEDICS | Facility: CLINIC | Age: 69
End: 2024-10-14
Payer: MEDICARE

## 2024-10-14 ENCOUNTER — HOSPITAL ENCOUNTER (OUTPATIENT)
Dept: RADIOLOGY | Facility: HOSPITAL | Age: 69
Discharge: HOME | End: 2024-10-14
Attending: ORTHOPAEDIC SURGERY
Payer: MEDICARE

## 2024-10-14 VITALS — WEIGHT: 170.19 LBS | HEIGHT: 68 IN | BODY MASS INDEX: 25.79 KG/M2

## 2024-10-14 DIAGNOSIS — M17.11 PRIMARY OSTEOARTHRITIS OF RIGHT KNEE: ICD-10-CM

## 2024-10-14 DIAGNOSIS — M17.11 PRIMARY OSTEOARTHRITIS OF RIGHT KNEE: Primary | ICD-10-CM

## 2024-10-14 PROCEDURE — 73560 X-RAY EXAM OF KNEE 1 OR 2: CPT | Mod: 26,59,LT, | Performed by: RADIOLOGY

## 2024-10-14 PROCEDURE — 1159F MED LIST DOCD IN RCRD: CPT | Mod: CPTII,S$GLB,, | Performed by: ORTHOPAEDIC SURGERY

## 2024-10-14 PROCEDURE — 99999 PR PBB SHADOW E&M-EST. PATIENT-LVL III: CPT | Mod: PBBFAC,,, | Performed by: ORTHOPAEDIC SURGERY

## 2024-10-14 PROCEDURE — 3008F BODY MASS INDEX DOCD: CPT | Mod: CPTII,S$GLB,, | Performed by: ORTHOPAEDIC SURGERY

## 2024-10-14 PROCEDURE — 4010F ACE/ARB THERAPY RXD/TAKEN: CPT | Mod: CPTII,S$GLB,, | Performed by: ORTHOPAEDIC SURGERY

## 2024-10-14 PROCEDURE — 73560 X-RAY EXAM OF KNEE 1 OR 2: CPT | Mod: TC,PO,LT

## 2024-10-14 PROCEDURE — 99214 OFFICE O/P EST MOD 30 MIN: CPT | Mod: 25,S$GLB,, | Performed by: ORTHOPAEDIC SURGERY

## 2024-10-14 PROCEDURE — 3044F HG A1C LEVEL LT 7.0%: CPT | Mod: CPTII,S$GLB,, | Performed by: ORTHOPAEDIC SURGERY

## 2024-10-14 PROCEDURE — 1125F AMNT PAIN NOTED PAIN PRSNT: CPT | Mod: CPTII,S$GLB,, | Performed by: ORTHOPAEDIC SURGERY

## 2024-10-14 PROCEDURE — 73562 X-RAY EXAM OF KNEE 3: CPT | Mod: 26,RT,, | Performed by: RADIOLOGY

## 2024-10-14 PROCEDURE — 20610 DRAIN/INJ JOINT/BURSA W/O US: CPT | Mod: RT,S$GLB,, | Performed by: ORTHOPAEDIC SURGERY

## 2024-10-14 RX ORDER — TRIAMCINOLONE ACETONIDE 40 MG/ML
40 INJECTION, SUSPENSION INTRA-ARTICULAR; INTRAMUSCULAR
Status: DISCONTINUED | OUTPATIENT
Start: 2024-10-14 | End: 2024-10-14 | Stop reason: HOSPADM

## 2024-10-14 RX ADMIN — TRIAMCINOLONE ACETONIDE 40 MG: 40 INJECTION, SUSPENSION INTRA-ARTICULAR; INTRAMUSCULAR at 10:10

## 2024-10-14 NOTE — PROGRESS NOTES
69 y.o. year old presents to the clinic today with recurring pain in the right knee.  Chronic problem.  Patient has had Kenlaog injections in the past and responded well.  Last injection was 7 months ago.  Symptoms not improving    Exam shows tenderness at the joint line without signs of infection or instability    X-rays show arthritic changes    Assessment:  right knee arthrosis    Plan:  Kenlaog into the right knee.  Encourage strengthening over time.  Followup as needed.

## 2024-10-14 NOTE — PROCEDURES
Large Joint Aspiration/Injection: R knee    Date/Time: 10/14/2024 10:30 AM    Performed by: Nelson Silva MD  Authorized by: Nelson Silva MD    Consent Done?:  Yes (Verbal)  Timeout: prior to procedure the correct patient, procedure, and site was verified    Prep: patient was prepped and draped in usual sterile fashion      Details:  Needle Size:  21 G  Approach:  Anterolateral  Location:  Knee  Site:  R knee  Medications:  40 mg triamcinolone acetonide 40 mg/mL  Patient tolerance:  Patient tolerated the procedure well with no immediate complications

## 2024-10-25 NOTE — TELEPHONE ENCOUNTER
Refill Decision Note   Edy Bartlett  is requesting a refill authorization.  Brief Assessment and Rationale for Refill:  Approve     Medication Therapy Plan:        Comments:     Note composed:2:44 PM 07/29/2024            
No care due was identified.  [facility] Embedded Care Due Messages. Reference number: [id number].   7/28/2024 8:01:04 AM CDT  
2024

## 2024-11-30 DIAGNOSIS — R13.19 ESOPHAGEAL DYSPHAGIA: Primary | ICD-10-CM

## 2024-12-03 DIAGNOSIS — J30.0 VASOMOTOR RHINITIS: Primary | ICD-10-CM

## 2024-12-03 RX ORDER — IPRATROPIUM BROMIDE 42 UG/1
2 SPRAY, METERED NASAL 4 TIMES DAILY
COMMUNITY
End: 2024-12-03 | Stop reason: SDUPTHER

## 2024-12-03 RX ORDER — IPRATROPIUM BROMIDE 42 UG/1
2 SPRAY, METERED NASAL 4 TIMES DAILY
Qty: 15 ML | Refills: 11 | Status: SHIPPED | OUTPATIENT
Start: 2024-12-03

## 2024-12-03 RX ORDER — PANTOPRAZOLE SODIUM 40 MG/1
40 TABLET, DELAYED RELEASE ORAL
Qty: 90 TABLET | Refills: 3 | Status: SHIPPED | OUTPATIENT
Start: 2024-12-03

## 2024-12-16 ENCOUNTER — OFFICE VISIT (OUTPATIENT)
Dept: ORTHOPEDICS | Facility: CLINIC | Age: 69
End: 2024-12-16
Payer: MEDICARE

## 2024-12-16 ENCOUNTER — TELEPHONE (OUTPATIENT)
Dept: PAIN MEDICINE | Facility: CLINIC | Age: 69
End: 2024-12-16
Payer: MEDICARE

## 2024-12-16 ENCOUNTER — LAB VISIT (OUTPATIENT)
Dept: LAB | Facility: HOSPITAL | Age: 69
End: 2024-12-16
Attending: ORTHOPAEDIC SURGERY
Payer: MEDICARE

## 2024-12-16 DIAGNOSIS — Z96.651 S/P RIGHT UNICOMPARTMENTAL KNEE REPLACEMENT: ICD-10-CM

## 2024-12-16 DIAGNOSIS — Z01.812 ENCOUNTER FOR PRE-OPERATIVE LABORATORY TESTING: Primary | ICD-10-CM

## 2024-12-16 DIAGNOSIS — M17.11 PRIMARY OSTEOARTHRITIS OF RIGHT KNEE: ICD-10-CM

## 2024-12-16 DIAGNOSIS — I10 ESSENTIAL HYPERTENSION: ICD-10-CM

## 2024-12-16 DIAGNOSIS — M17.11 OSTEOARTHRITIS OF RIGHT KNEE: ICD-10-CM

## 2024-12-16 DIAGNOSIS — M25.561 RIGHT KNEE PAIN: ICD-10-CM

## 2024-12-16 DIAGNOSIS — M17.11 PRIMARY OSTEOARTHRITIS OF RIGHT KNEE: Primary | ICD-10-CM

## 2024-12-16 DIAGNOSIS — Z01.812 ENCOUNTER FOR PRE-OPERATIVE LABORATORY TESTING: ICD-10-CM

## 2024-12-16 DIAGNOSIS — M25.561 RIGHT KNEE PAIN: Primary | ICD-10-CM

## 2024-12-16 LAB
ABO + RH BLD: NORMAL
ALBUMIN SERPL BCP-MCNC: 3.7 G/DL (ref 3.5–5.2)
ALP SERPL-CCNC: 74 U/L (ref 40–150)
ALT SERPL W/O P-5'-P-CCNC: 17 U/L (ref 10–44)
ANION GAP SERPL CALC-SCNC: 7 MMOL/L (ref 8–16)
AST SERPL-CCNC: 17 U/L (ref 10–40)
BASOPHILS # BLD AUTO: 0.03 K/UL (ref 0–0.2)
BASOPHILS NFR BLD: 0.6 % (ref 0–1.9)
BILIRUB SERPL-MCNC: 0.4 MG/DL (ref 0.1–1)
BLD GP AB SCN CELLS X3 SERPL QL: NORMAL
BUN SERPL-MCNC: 10 MG/DL (ref 8–23)
CALCIUM SERPL-MCNC: 9.4 MG/DL (ref 8.7–10.5)
CHLORIDE SERPL-SCNC: 104 MMOL/L (ref 95–110)
CO2 SERPL-SCNC: 27 MMOL/L (ref 23–29)
CREAT SERPL-MCNC: 1 MG/DL (ref 0.5–1.4)
DIFFERENTIAL METHOD BLD: ABNORMAL
EOSINOPHIL # BLD AUTO: 0 K/UL (ref 0–0.5)
EOSINOPHIL NFR BLD: 0.6 % (ref 0–8)
ERYTHROCYTE [DISTWIDTH] IN BLOOD BY AUTOMATED COUNT: 13.8 % (ref 11.5–14.5)
EST. GFR  (NO RACE VARIABLE): >60 ML/MIN/1.73 M^2
GLUCOSE SERPL-MCNC: 99 MG/DL (ref 70–110)
HCT VFR BLD AUTO: 49.5 % (ref 40–54)
HGB BLD-MCNC: 16.6 G/DL (ref 14–18)
IMM GRANULOCYTES # BLD AUTO: 0.03 K/UL (ref 0–0.04)
IMM GRANULOCYTES NFR BLD AUTO: 0.6 % (ref 0–0.5)
LYMPHOCYTES # BLD AUTO: 1 K/UL (ref 1–4.8)
LYMPHOCYTES NFR BLD: 18.2 % (ref 18–48)
MCH RBC QN AUTO: 31.6 PG (ref 27–31)
MCHC RBC AUTO-ENTMCNC: 33.5 G/DL (ref 32–36)
MCV RBC AUTO: 94 FL (ref 82–98)
MONOCYTES # BLD AUTO: 0.6 K/UL (ref 0.3–1)
MONOCYTES NFR BLD: 10.9 % (ref 4–15)
NEUTROPHILS # BLD AUTO: 3.6 K/UL (ref 1.8–7.7)
NEUTROPHILS NFR BLD: 69.1 % (ref 38–73)
NRBC BLD-RTO: 0 /100 WBC
PLATELET # BLD AUTO: 198 K/UL (ref 150–450)
PMV BLD AUTO: 9.3 FL (ref 9.2–12.9)
POTASSIUM SERPL-SCNC: 5 MMOL/L (ref 3.5–5.1)
PROT SERPL-MCNC: 6.9 G/DL (ref 6–8.4)
RBC # BLD AUTO: 5.25 M/UL (ref 4.6–6.2)
SODIUM SERPL-SCNC: 138 MMOL/L (ref 136–145)
SPECIMEN OUTDATE: NORMAL
WBC # BLD AUTO: 5.21 K/UL (ref 3.9–12.7)

## 2024-12-16 PROCEDURE — 36415 COLL VENOUS BLD VENIPUNCTURE: CPT | Mod: PO | Performed by: ORTHOPAEDIC SURGERY

## 2024-12-16 PROCEDURE — 99215 OFFICE O/P EST HI 40 MIN: CPT | Mod: 57,S$GLB,, | Performed by: ORTHOPAEDIC SURGERY

## 2024-12-16 PROCEDURE — 87641 MR-STAPH DNA AMP PROBE: CPT | Performed by: ORTHOPAEDIC SURGERY

## 2024-12-16 PROCEDURE — 85025 COMPLETE CBC W/AUTO DIFF WBC: CPT | Performed by: NURSE PRACTITIONER

## 2024-12-16 PROCEDURE — 86900 BLOOD TYPING SEROLOGIC ABO: CPT | Performed by: ORTHOPAEDIC SURGERY

## 2024-12-16 PROCEDURE — 4010F ACE/ARB THERAPY RXD/TAKEN: CPT | Mod: CPTII,S$GLB,, | Performed by: ORTHOPAEDIC SURGERY

## 2024-12-16 PROCEDURE — 3044F HG A1C LEVEL LT 7.0%: CPT | Mod: CPTII,S$GLB,, | Performed by: ORTHOPAEDIC SURGERY

## 2024-12-16 PROCEDURE — 80053 COMPREHEN METABOLIC PANEL: CPT | Performed by: NURSE PRACTITIONER

## 2024-12-16 RX ORDER — CEFAZOLIN SODIUM 2 G/50ML
2 SOLUTION INTRAVENOUS
OUTPATIENT
Start: 2024-12-16

## 2024-12-16 RX ORDER — MUPIROCIN 20 MG/G
OINTMENT TOPICAL
OUTPATIENT
Start: 2024-12-16

## 2024-12-16 NOTE — H&P (VIEW-ONLY)
69 years old debilitating pain of the right knee at the medial joint line.  Injections have provided temporary partial relief.  He has done well with a partial knee replacement on left comes in today interested in the possibility of a right partial knee replacement due to the fact that he has failed a nonoperative course including oral medications, physical therapy, bracing, and injections    Exam shows tenderness to medial joint line with a passively correctable varus deformity skin is intact compartments soft     X-rays show right knee arthrosis medial compartment disease     Assessment: Right knee arthrosis medial compartment disease     Plan:  Patient has failed a nonoperative course, we will schedule him for partial knee replacement, he is aware of the risks and limitations of surgery and still wants to proceed

## 2024-12-16 NOTE — TELEPHONE ENCOUNTER
Called pt as requested   He wanted to schedule an appt for back pain lov  12/29/2020 considered as a new pt with us offered several dates to pt he states he can not make the dates offered   He would call back to schedule

## 2024-12-17 LAB — MRSA SCREEN BY PCR: NOT DETECTED

## 2024-12-19 ENCOUNTER — HOSPITAL ENCOUNTER (OUTPATIENT)
Dept: RADIOLOGY | Facility: HOSPITAL | Age: 69
Discharge: HOME | End: 2024-12-19
Attending: INTERNAL MEDICINE
Payer: MEDICARE

## 2024-12-19 ENCOUNTER — OFFICE VISIT (OUTPATIENT)
Dept: FAMILY MEDICINE | Facility: CLINIC | Age: 69
End: 2024-12-19
Payer: MEDICARE

## 2024-12-19 VITALS
DIASTOLIC BLOOD PRESSURE: 84 MMHG | HEIGHT: 68 IN | WEIGHT: 175 LBS | BODY MASS INDEX: 26.52 KG/M2 | OXYGEN SATURATION: 96 % | SYSTOLIC BLOOD PRESSURE: 136 MMHG | HEART RATE: 68 BPM

## 2024-12-19 DIAGNOSIS — H66.002 NON-RECURRENT ACUTE SUPPURATIVE OTITIS MEDIA OF LEFT EAR WITHOUT SPONTANEOUS RUPTURE OF TYMPANIC MEMBRANE: ICD-10-CM

## 2024-12-19 DIAGNOSIS — M25.561 CHRONIC PAIN OF RIGHT KNEE: ICD-10-CM

## 2024-12-19 DIAGNOSIS — G89.29 CHRONIC PAIN OF RIGHT KNEE: ICD-10-CM

## 2024-12-19 DIAGNOSIS — Z86.73 HISTORY OF CVA (CEREBROVASCULAR ACCIDENT): ICD-10-CM

## 2024-12-19 DIAGNOSIS — Z01.818 PRE-OP EVALUATION: Primary | ICD-10-CM

## 2024-12-19 DIAGNOSIS — M54.2 CERVICALGIA: ICD-10-CM

## 2024-12-19 DIAGNOSIS — Z01.818 PRE-OP EVALUATION: ICD-10-CM

## 2024-12-19 DIAGNOSIS — I10 ESSENTIAL HYPERTENSION: ICD-10-CM

## 2024-12-19 DIAGNOSIS — R13.19 ESOPHAGEAL DYSPHAGIA: ICD-10-CM

## 2024-12-19 DIAGNOSIS — G89.29 CHRONIC BILATERAL LOW BACK PAIN WITHOUT SCIATICA: ICD-10-CM

## 2024-12-19 DIAGNOSIS — M54.50 CHRONIC BILATERAL LOW BACK PAIN WITHOUT SCIATICA: ICD-10-CM

## 2024-12-19 PROCEDURE — 1160F RVW MEDS BY RX/DR IN RCRD: CPT | Mod: CPTII,S$GLB,, | Performed by: INTERNAL MEDICINE

## 2024-12-19 PROCEDURE — 1125F AMNT PAIN NOTED PAIN PRSNT: CPT | Mod: CPTII,S$GLB,, | Performed by: INTERNAL MEDICINE

## 2024-12-19 PROCEDURE — 4010F ACE/ARB THERAPY RXD/TAKEN: CPT | Mod: CPTII,S$GLB,, | Performed by: INTERNAL MEDICINE

## 2024-12-19 PROCEDURE — G2211 COMPLEX E/M VISIT ADD ON: HCPCS | Mod: S$GLB,,, | Performed by: INTERNAL MEDICINE

## 2024-12-19 PROCEDURE — 71046 X-RAY EXAM CHEST 2 VIEWS: CPT | Mod: 26,,, | Performed by: STUDENT IN AN ORGANIZED HEALTH CARE EDUCATION/TRAINING PROGRAM

## 2024-12-19 PROCEDURE — 99999 PR PBB SHADOW E&M-EST. PATIENT-LVL III: CPT | Mod: PBBFAC,,, | Performed by: INTERNAL MEDICINE

## 2024-12-19 PROCEDURE — 3079F DIAST BP 80-89 MM HG: CPT | Mod: CPTII,S$GLB,, | Performed by: INTERNAL MEDICINE

## 2024-12-19 PROCEDURE — 3288F FALL RISK ASSESSMENT DOCD: CPT | Mod: CPTII,S$GLB,, | Performed by: INTERNAL MEDICINE

## 2024-12-19 PROCEDURE — 1159F MED LIST DOCD IN RCRD: CPT | Mod: CPTII,S$GLB,, | Performed by: INTERNAL MEDICINE

## 2024-12-19 PROCEDURE — 99214 OFFICE O/P EST MOD 30 MIN: CPT | Mod: S$GLB,,, | Performed by: INTERNAL MEDICINE

## 2024-12-19 PROCEDURE — 3044F HG A1C LEVEL LT 7.0%: CPT | Mod: CPTII,S$GLB,, | Performed by: INTERNAL MEDICINE

## 2024-12-19 PROCEDURE — 71046 X-RAY EXAM CHEST 2 VIEWS: CPT | Mod: TC,FY,PO

## 2024-12-19 PROCEDURE — 3075F SYST BP GE 130 - 139MM HG: CPT | Mod: CPTII,S$GLB,, | Performed by: INTERNAL MEDICINE

## 2024-12-19 PROCEDURE — 3008F BODY MASS INDEX DOCD: CPT | Mod: CPTII,S$GLB,, | Performed by: INTERNAL MEDICINE

## 2024-12-19 PROCEDURE — 1100F PTFALLS ASSESS-DOCD GE2>/YR: CPT | Mod: CPTII,S$GLB,, | Performed by: INTERNAL MEDICINE

## 2024-12-19 RX ORDER — HYDROCODONE BITARTRATE AND ACETAMINOPHEN 5; 325 MG/1; MG/1
1 TABLET ORAL
Qty: 30 TABLET | Refills: 0 | Status: SHIPPED | OUTPATIENT
Start: 2024-12-19

## 2024-12-19 RX ORDER — AMOXICILLIN AND CLAVULANATE POTASSIUM 875; 125 MG/1; MG/1
1 TABLET, FILM COATED ORAL EVERY 12 HOURS
Qty: 14 TABLET | Refills: 0 | Status: SHIPPED | OUTPATIENT
Start: 2024-12-19 | End: 2024-12-26

## 2024-12-19 NOTE — PROGRESS NOTES
"[facility]  Primary Care   [address]       Patient ID: Edy Bartlett III     Chief Complaint:   Chief Complaint   Patient presents with    Pre-op Exam    Medication Refill        HPI:  Patient is here for a preop examination in advance of a right partial knee replacement.  Thankfully he overall feels well so I have no problems clearing him for the surgery.  He does note that when he takes his lisinopril, he is clumsy your with the feet and feels a lot better off it so I want him to stop the lisinopril for now and hydrate better with water with electrolytes.  He mainly drinks Coca-Cola and I think dehydration is a factor.  Recent labs look pretty good.  He does need a chest x-ray which we will get today.  EKG looks good.  He has not had any recent chest pain or shortness a breath.  He does complain of left ear pain and he does have a otitis media there that I will treat with Augmentin 875 mg twice daily for 7 days but that will not prevent his surgery.  He does request a refill on hydrocodone but I last refilled in April as he takes it very sparingly for both his knee pain and some lower back pain that he is planning to see a neurosurgeon to get evaluated after he recovers from his knee surgery.  He will have to stop his Plavix 5-7 days prior to the surgery and he understands that.    Review of Systems       Left ear pain     Objective:      Physical Exam   Physical Exam       Left otitis media     Vitals:   Vitals:    12/19/24 1552   BP: 136/84   Pulse: 68   SpO2: 96%   Weight: 79.4 kg (175 lb)   Height: 5' 8" (1.727 m)        Assessment:           Plan:       Edy Bartlett III  was seen today for follow-up and may need lab work.    Diagnoses and all orders for this visit:    Edy was seen today for pre-op exam and medication refill.    Diagnoses and all orders for this visit:    Pre-op evaluation  -     X-Ray Chest PA And Lateral; Future  Other labs reviewed and they look ok "     Cervicalgia  -     HYDROcodone-acetaminophen (NORCO) 5-325 mg per tablet; Take 1 tablet by mouth every 24 hours as needed for Pain.  Controlled with Hydrocodone     Non-recurrent acute suppurative otitis media of left ear without spontaneous rupture of tympanic membrane  -     amoxicillin-clavulanate 875-125mg (AUGMENTIN) 875-125 mg per tablet; Take 1 tablet by mouth every 12 (twelve) hours. for 7 days    History of CVA (cerebrovascular accident)  We will stop his Plavix 5-7 days prior to the surgery    Essential hypertension  Stop lisinopril 10 mg as it maybe contributing to some orthostatic hypotension.  Please hydrate better with water with electrolytes    Esophageal dysphagia  Controlled with pantoprazole    Chronic bilateral low back pain without sciatica  Improved with hydrocodone but he will see Neurosurgery after he recovers from his knee surgery    Chronic pain of right knee  Cleared for partial right knee replacement    Visit today included increased complexity associated with the care of the episodic problem cervicalgia lumbago otitis media history of a CVA hypertension right knee pain addressed and managing the longitudinal care of the patient due to the serious and/or complex managed problem(s) .           Huy Cordova MD

## 2024-12-19 NOTE — PLAN OF CARE
[facility]  Physical Therapy Initial Evaluation      Date: 12/31/2024   Name: Edy Bartlett [facility]  Clinic Number: [id number]    Therapy Diagnosis: No diagnosis found.  Physician: Nelson Silva MD    Physician Orders: PT Eval and Treat   Medical Diagnosis from Referral: Primary osteoarthritis of right knee [M17.11], Right knee pain [M25.561], S/P right unicompartmental knee replacement   Evaluation Date: 12/31/2024  Authorization Period Expiration: 12/16/2025  Plan of Care Expiration: 12/19/2024  Visit # / Visits authorized: 1/ 1     Precautions: Fall and Weightbearing     Time In: 1100  Time Out: 1130  Total Appointment Time (timed & untimed codes): 30 minutes      SUBJECTIVE   Date of onset: 12/19/2024    History of current condition - Edy reports: he underwent a R uni-knee this morning. Pt states he received RW training prior to surgery.  Patient is agreeble to participate with PT Evaluation.     Social History:  lives alone  Prior Level of Function: ambulating without AD   Current Level of Function: ambulating with RW    Pain:  Current 1/10,  (Faces scale)   Location: right knee    Patients goals: To go home     Medical History:   Past Medical History:   Diagnosis Date    BPH (benign prostatic hypertrophy) with urinary obstruction     Carotid stenosis 11/14/2023    Cerebellar stroke     Cerebrovascular accident (CVA) due to occlusion of left cerebellar artery 05/08/2020    Complication of anesthesia     gets very sore neck post-op if his head is twisted or bent too far with intubation    DDD (degenerative disc disease)     DDD (degenerative disc disease), cervical     DDD (degenerative disc disease), lumbar     DJD (degenerative joint disease) of knee 12/10/2012    ED (erectile dysfunction)     Elevated PSA     Hyperlipidemia     Hypertension     Mass of skin 2012    left temple, possible lipoma    Mobility impaired     cane    Neuropathy     feet    OA (osteoarthritis of spine)      Throat cancer     squamous cell - per Dr. Austin    Unspecified sensorineural hearing loss     Left Ear    Upper airway cough syndrome 03/27/2020       Surgical History:   Edy Bartlett III  has a past surgical history that includes Cervical spine surgery; Carpal tunnel release; hematoma removed (2006); Fracture surgery; Appendectomy; Hernia repair; Trigger finger release (2007); Epidural block injection; Tendon repair; Knee arthroscopy; Prostate biopsy; Tumor excision; urolift; Unicompartmental arthroplasty of knee (Left, 9/10/2019); Carotid endarterectomy (Left, 11/14/2023); and Esophagogastroduodenoscopy (N/A, 2/8/2024).    Medications:   Edy has a current medication list which includes the following prescription(s): acetaminophen, praluent pen, aspirin, clopidogrel, hydrocodone-acetaminophen, ibuprofen, ipratropium, lisinopril, and pantoprazole.    Allergies:   Review of patient's allergies indicates:   Allergen Reactions    Statins-hmg-coa reductase inhibitors Other (See Comments)     Not allergy, makes him feel awful        Objective     Patient found with HOB elevated, with PIV. RN reports patient is medically appropriate for PT.    Cognitive Exam:  Oriented to: Person, Place, Time, and Situation    Physical Exam:    Lower Extremity Range of Motion:  Right Lower Extremity: WFL except knee  Left Lower Extremity: WFL    Lower Extremity Strength:  Right Lower Extremity: WFL except knee  Left Lower Extremity: WFL    Functional Mobility:  Bed Mobility:  Supine to sit: min A cueing for sequencing     Transfers:  Sit <> stand: min A cueing for sequencing and hand placement       Gait:   Ambulated 200 ft with RW and CGA assist. Pt demo appropriate weight shift and quad control during stance.     Therapeutic Activity to improve function for 10 minutes:   Education on exercise to lower risk of DVT, functional mobility, weight shifting, and education to safely negotiate home environment     Home Exercises and  Patient Education Provided    Education provided:   - Safety with RW  - Safety with mobility     Assessment   Edy is a 69 y.o. male referred to outpatient Physical Therapy with a medical diagnosis of Primary osteoarthritis of right knee [M17.11], Right knee pain [M25.561], S/P right unicompartmental knee replacement  Pt presents with decreased ROM, decreased LE strength, gait instability, impaired balance, pain, and impaired functional mobility. He required min A for functional mobility today with cueing for safety with transfers and ambulation.    Pt prognosis is Good.   Pt will benefit from skilled outpatient Physical Therapy to address the deficits stated above and in the chart below, provide pt/family education, and to maximize pt's level of independence.     Plan of care discussed with patient: Yes  Pt's spiritual, cultural and educational needs considered and patient is agreeable to the plan of care and goals as stated below:     Medical Necessity is demonstrated by the following   History  Co-morbidities and personal factors that may impact the plan of care [x] LOW: no personal factors / co-morbidities  [] MODERATE: 1-2 personal factors / co-morbidities  [] HIGH: 3+ personal factors / co-morbidities    Moderate / High Support Documentation:   Co-morbidities affecting plan of care: see above     Personal Factors:   age     Examination  Body Structures and Functions, activity limitations and participation restrictions that may impact the plan of care [x] LOW: addressing 1-2 elements  [] MODERATE: 3+ elements  [] HIGH: 4+ elements (please support below)    Moderate / High Support Documentation: see above      Clinical Presentation [x] LOW: stable  [] MODERATE: Evolving  [] HIGH: Unstable     Decision Making/ Complexity Score: low               Plan     Discharge Home with family with Home Health Physical Therapy when medically appropriate     Tod Savage, PT, DPT

## 2024-12-23 ENCOUNTER — TELEPHONE (OUTPATIENT)
Dept: ORTHOPEDICS | Facility: CLINIC | Age: 69
End: 2024-12-23
Payer: MEDICARE

## 2024-12-23 ENCOUNTER — ANESTHESIA EVENT (OUTPATIENT)
Dept: SURGERY | Facility: HOSPITAL | Age: 69
End: 2024-12-23
Payer: MEDICARE

## 2024-12-23 NOTE — TELEPHONE ENCOUNTER
Contacted pt regarding when to stop blood thinners before surgery. Informed pt to stop 5 days prior to surgery. Pt verbalized understanding.

## 2024-12-23 NOTE — TELEPHONE ENCOUNTER
----- Message from Huy Cordova MD sent at 12/21/2024  9:06 AM CST -----  If you are satisfied with holding Plavix for 3-5 days, then I am as well.  Please let him know.  ----- Message -----  From: Trevon Cesar LPN  Sent: 12/20/2024   8:01 AM CST  To: Huy Cordova MD    We give the round about of 3-5 days before surgery with blood thinners but to clarify with the ordering physician.  ----- Message -----  From: Huy Cordova MD  Sent: 12/19/2024   5:31 PM CST  To: Nelson Silva MD; Shira FERRELL. Staff    I told him to stop his Plavix 5-7 days before surgery,  but he said you told him to stop it 2 days before surgery. Just clarifying.

## 2024-12-30 ENCOUNTER — TELEPHONE (OUTPATIENT)
Dept: ORTHOPEDICS | Facility: CLINIC | Age: 69
End: 2024-12-30
Payer: MEDICARE

## 2024-12-30 DIAGNOSIS — M17.11 PRIMARY OSTEOARTHRITIS OF RIGHT KNEE: ICD-10-CM

## 2024-12-30 DIAGNOSIS — Z96.651 S/P RIGHT UNICOMPARTMENTAL KNEE REPLACEMENT: Primary | ICD-10-CM

## 2024-12-30 RX ORDER — OXYCODONE AND ACETAMINOPHEN 7.5; 325 MG/1; MG/1
1 TABLET ORAL
Qty: 42 TABLET | Refills: 0 | Status: SHIPPED | OUTPATIENT
Start: 2024-12-30

## 2024-12-30 RX ORDER — CEPHALEXIN 500 MG/1
500 CAPSULE ORAL 4 TIMES DAILY
Qty: 20 CAPSULE | Refills: 0 | Status: SHIPPED | OUTPATIENT
Start: 2024-12-30

## 2024-12-30 NOTE — TELEPHONE ENCOUNTER
----- Message from Concha sent at 12/30/2024  8:25 AM CST -----  Contact: self  Type:  Needs Medical Advice    Who Called: self  Symptoms (please be specific): pt is needing nurse to give him a call regarding his knee surgery tomorrow.    Would the patient rather a call back or a response via [facility]? call  Best Call Back Number: [phone] (home)     Additional Information: please advise and thank you.

## 2024-12-31 ENCOUNTER — CLINICAL SUPPORT (OUTPATIENT)
Dept: REHABILITATION | Facility: HOSPITAL | Age: 69
End: 2024-12-31
Attending: ORTHOPAEDIC SURGERY
Payer: MEDICARE

## 2024-12-31 ENCOUNTER — HOSPITAL ENCOUNTER (OUTPATIENT)
Dept: RADIOLOGY | Facility: HOSPITAL | Age: 69
Discharge: HOME | End: 2024-12-31
Attending: ORTHOPAEDIC SURGERY | Admitting: ORTHOPAEDIC SURGERY
Payer: MEDICARE

## 2024-12-31 ENCOUNTER — ANESTHESIA (OUTPATIENT)
Dept: SURGERY | Facility: HOSPITAL | Age: 69
End: 2024-12-31
Payer: MEDICARE

## 2024-12-31 ENCOUNTER — HOSPITAL ENCOUNTER (OUTPATIENT)
Facility: HOSPITAL | Age: 69
Discharge: HOME | End: 2024-12-31
Attending: ORTHOPAEDIC SURGERY | Admitting: ORTHOPAEDIC SURGERY
Payer: MEDICARE

## 2024-12-31 DIAGNOSIS — M25.561 RIGHT KNEE PAIN: ICD-10-CM

## 2024-12-31 DIAGNOSIS — Z96.651 S/P RIGHT UNICOMPARTMENTAL KNEE REPLACEMENT: ICD-10-CM

## 2024-12-31 DIAGNOSIS — M17.11 PRIMARY OSTEOARTHRITIS OF RIGHT KNEE: ICD-10-CM

## 2024-12-31 DIAGNOSIS — M17.11 OSTEOARTHRITIS OF RIGHT KNEE: ICD-10-CM

## 2024-12-31 PROCEDURE — C1713 ANCHOR/SCREW BN/BN,TIS/BN: HCPCS | Mod: PO | Performed by: ORTHOPAEDIC SURGERY

## 2024-12-31 PROCEDURE — 25000003 PHARM REV CODE 250: Mod: PO | Performed by: NURSE ANESTHETIST, CERTIFIED REGISTERED

## 2024-12-31 PROCEDURE — 97161 PT EVAL LOW COMPLEX 20 MIN: CPT | Mod: PO | Performed by: PHYSICAL THERAPIST

## 2024-12-31 PROCEDURE — 27446 REVISION OF KNEE JOINT: CPT | Mod: RT,,, | Performed by: ORTHOPAEDIC SURGERY

## 2024-12-31 PROCEDURE — 36000711: Mod: PO | Performed by: ORTHOPAEDIC SURGERY

## 2024-12-31 PROCEDURE — 63600175 PHARM REV CODE 636 W HCPCS: Mod: PO | Performed by: NURSE ANESTHETIST, CERTIFIED REGISTERED

## 2024-12-31 PROCEDURE — 73560 X-RAY EXAM OF KNEE 1 OR 2: CPT | Mod: 26,RT,, | Performed by: RADIOLOGY

## 2024-12-31 PROCEDURE — 71000015 HC POSTOP RECOV 1ST HR: Mod: PO | Performed by: ORTHOPAEDIC SURGERY

## 2024-12-31 PROCEDURE — 27201423 OPTIME MED/SURG SUP & DEVICES STERILE SUPPLY: Mod: PO | Performed by: ORTHOPAEDIC SURGERY

## 2024-12-31 PROCEDURE — 63600175 PHARM REV CODE 636 W HCPCS: Mod: PO | Performed by: ANESTHESIOLOGY

## 2024-12-31 PROCEDURE — 37000008 HC ANESTHESIA 1ST 15 MINUTES: Mod: PO | Performed by: ORTHOPAEDIC SURGERY

## 2024-12-31 PROCEDURE — 63600175 PHARM REV CODE 636 W HCPCS: Mod: PO | Performed by: ORTHOPAEDIC SURGERY

## 2024-12-31 PROCEDURE — 71000033 HC RECOVERY, INTIAL HOUR: Mod: PO | Performed by: ORTHOPAEDIC SURGERY

## 2024-12-31 PROCEDURE — 63600175 PHARM REV CODE 636 W HCPCS: Mod: JZ,JG,PO | Performed by: ANESTHESIOLOGY

## 2024-12-31 PROCEDURE — 25000003 PHARM REV CODE 250: Mod: PO | Performed by: ORTHOPAEDIC SURGERY

## 2024-12-31 PROCEDURE — C1776 JOINT DEVICE (IMPLANTABLE): HCPCS | Mod: PO | Performed by: ORTHOPAEDIC SURGERY

## 2024-12-31 PROCEDURE — 64447 NJX AA&/STRD FEMORAL NRV IMG: CPT | Mod: PO | Performed by: ANESTHESIOLOGY

## 2024-12-31 PROCEDURE — 37000009 HC ANESTHESIA EA ADD 15 MINS: Mod: PO | Performed by: ORTHOPAEDIC SURGERY

## 2024-12-31 PROCEDURE — 36000710: Mod: PO | Performed by: ORTHOPAEDIC SURGERY

## 2024-12-31 PROCEDURE — C9290 INJ, BUPIVACAINE LIPOSOME: HCPCS | Mod: PO | Performed by: ANESTHESIOLOGY

## 2024-12-31 PROCEDURE — 71000016 HC POSTOP RECOV ADDL HR: Mod: PO | Performed by: ORTHOPAEDIC SURGERY

## 2024-12-31 PROCEDURE — 97530 THERAPEUTIC ACTIVITIES: CPT | Mod: PO | Performed by: PHYSICAL THERAPIST

## 2024-12-31 PROCEDURE — 73560 X-RAY EXAM OF KNEE 1 OR 2: CPT | Mod: TC,FY,PO,RT

## 2024-12-31 PROCEDURE — 25000003 PHARM REV CODE 250: Mod: PO | Performed by: ANESTHESIOLOGY

## 2024-12-31 DEVICE — ANATOMIC MENISCAL BEARINGRIGHT MEDIAL
Type: IMPLANTABLE DEVICE | Site: KNEE | Status: FUNCTIONAL
Brand: OXFORD PARTIAL KNEE SYSTEM

## 2024-12-31 DEVICE — CEMENT BONE W/GENT SIMPLEX HV: Type: IMPLANTABLE DEVICE | Site: KNEE | Status: FUNCTIONAL

## 2024-12-31 DEVICE — IMPLANTABLE DEVICE
Type: IMPLANTABLE DEVICE | Site: KNEE | Status: FUNCTIONAL
Brand: OXFORD PARTIAL KNEE SYSTEM

## 2024-12-31 DEVICE — TWIN PEG FEMORAL
Type: IMPLANTABLE DEVICE | Site: KNEE | Status: FUNCTIONAL
Brand: OXFORD PARTIAL KNEE SYSTEM

## 2024-12-31 RX ORDER — DEXMEDETOMIDINE HYDROCHLORIDE 100 UG/ML
INJECTION, SOLUTION INTRAVENOUS
Status: DISCONTINUED | OUTPATIENT
Start: 2024-12-31 | End: 2024-12-31

## 2024-12-31 RX ORDER — FENTANYL CITRATE 50 UG/ML
INJECTION, SOLUTION INTRAMUSCULAR; INTRAVENOUS
Status: DISCONTINUED | OUTPATIENT
Start: 2024-12-31 | End: 2024-12-31

## 2024-12-31 RX ORDER — HYDROMORPHONE HYDROCHLORIDE 2 MG/ML
0.2 INJECTION, SOLUTION INTRAMUSCULAR; INTRAVENOUS; SUBCUTANEOUS EVERY 5 MIN PRN
Status: DISCONTINUED | OUTPATIENT
Start: 2024-12-31 | End: 2024-12-31 | Stop reason: HOSPADM

## 2024-12-31 RX ORDER — GLUCAGON 1 MG
1 KIT INJECTION
Status: DISCONTINUED | OUTPATIENT
Start: 2024-12-31 | End: 2024-12-31 | Stop reason: HOSPADM

## 2024-12-31 RX ORDER — BUPIVACAINE 13.3 MG/ML
INJECTION, SUSPENSION, LIPOSOMAL INFILTRATION
Status: COMPLETED | OUTPATIENT
Start: 2024-12-31 | End: 2024-12-31

## 2024-12-31 RX ORDER — CEFAZOLIN SODIUM 1 G/3ML
1 INJECTION, POWDER, FOR SOLUTION INTRAMUSCULAR; INTRAVENOUS ONCE
Status: COMPLETED | OUTPATIENT
Start: 2024-12-31 | End: 2024-12-31

## 2024-12-31 RX ORDER — MIDAZOLAM HYDROCHLORIDE 1 MG/ML
.5-4 INJECTION, SOLUTION INTRAMUSCULAR; INTRAVENOUS
Status: DISCONTINUED | OUTPATIENT
Start: 2024-12-31 | End: 2024-12-31 | Stop reason: HOSPADM

## 2024-12-31 RX ORDER — CEFAZOLIN SODIUM 1 G/3ML
2 INJECTION, POWDER, FOR SOLUTION INTRAMUSCULAR; INTRAVENOUS
Status: COMPLETED | OUTPATIENT
Start: 2024-12-31 | End: 2024-12-31

## 2024-12-31 RX ORDER — PROPOFOL 10 MG/ML
VIAL (ML) INTRAVENOUS
Status: DISCONTINUED | OUTPATIENT
Start: 2024-12-31 | End: 2024-12-31

## 2024-12-31 RX ORDER — BUPIVACAINE HYDROCHLORIDE 5 MG/ML
INJECTION, SOLUTION EPIDURAL; INTRACAUDAL
Status: COMPLETED | OUTPATIENT
Start: 2024-12-31 | End: 2024-12-31

## 2024-12-31 RX ORDER — FENTANYL CITRATE 50 UG/ML
25-200 INJECTION, SOLUTION INTRAMUSCULAR; INTRAVENOUS
Status: DISCONTINUED | OUTPATIENT
Start: 2024-12-31 | End: 2024-12-31 | Stop reason: HOSPADM

## 2024-12-31 RX ORDER — PHENYLEPHRINE HYDROCHLORIDE 10 MG/ML
INJECTION INTRAVENOUS
Status: DISCONTINUED | OUTPATIENT
Start: 2024-12-31 | End: 2024-12-31

## 2024-12-31 RX ORDER — LIDOCAINE HYDROCHLORIDE 20 MG/ML
INJECTION INTRAVENOUS
Status: DISCONTINUED | OUTPATIENT
Start: 2024-12-31 | End: 2024-12-31

## 2024-12-31 RX ORDER — OXYCODONE HCL 10 MG/1
10 TABLET, FILM COATED, EXTENDED RELEASE ORAL ONCE
Status: COMPLETED | OUTPATIENT
Start: 2024-12-31 | End: 2024-12-31

## 2024-12-31 RX ORDER — TRANEXAMIC ACID 100 MG/ML
INJECTION, SOLUTION INTRAVENOUS
Status: DISCONTINUED | OUTPATIENT
Start: 2024-12-31 | End: 2024-12-31

## 2024-12-31 RX ORDER — ACETAMINOPHEN 500 MG
1000 TABLET ORAL
Status: COMPLETED | OUTPATIENT
Start: 2024-12-31 | End: 2024-12-31

## 2024-12-31 RX ORDER — ONDANSETRON HYDROCHLORIDE 2 MG/ML
4 INJECTION, SOLUTION INTRAVENOUS DAILY PRN
Status: DISCONTINUED | OUTPATIENT
Start: 2024-12-31 | End: 2024-12-31 | Stop reason: HOSPADM

## 2024-12-31 RX ORDER — DIPHENHYDRAMINE HYDROCHLORIDE 50 MG/ML
12.5 INJECTION INTRAMUSCULAR; INTRAVENOUS EVERY 6 HOURS PRN
Status: DISCONTINUED | OUTPATIENT
Start: 2024-12-31 | End: 2024-12-31 | Stop reason: HOSPADM

## 2024-12-31 RX ORDER — SODIUM CHLORIDE, SODIUM LACTATE, POTASSIUM CHLORIDE, CALCIUM CHLORIDE 600; 310; 30; 20 MG/100ML; MG/100ML; MG/100ML; MG/100ML
1000 INJECTION, SOLUTION INTRAVENOUS ONCE
Status: COMPLETED | OUTPATIENT
Start: 2024-12-31 | End: 2024-12-31

## 2024-12-31 RX ORDER — FENTANYL CITRATE 50 UG/ML
25 INJECTION, SOLUTION INTRAMUSCULAR; INTRAVENOUS EVERY 5 MIN PRN
Status: DISCONTINUED | OUTPATIENT
Start: 2024-12-31 | End: 2024-12-31 | Stop reason: HOSPADM

## 2024-12-31 RX ORDER — METHYLENE BLUE 5 MG/ML
INJECTION INTRAVENOUS
Status: DISCONTINUED | OUTPATIENT
Start: 2024-12-31 | End: 2024-12-31 | Stop reason: HOSPADM

## 2024-12-31 RX ORDER — CELECOXIB 200 MG/1
200 CAPSULE ORAL ONCE
Status: COMPLETED | OUTPATIENT
Start: 2024-12-31 | End: 2024-12-31

## 2024-12-31 RX ORDER — MEPERIDINE HYDROCHLORIDE 50 MG/ML
12.5 INJECTION INTRAMUSCULAR; INTRAVENOUS; SUBCUTANEOUS ONCE AS NEEDED
Status: DISCONTINUED | OUTPATIENT
Start: 2024-12-31 | End: 2024-12-31 | Stop reason: HOSPADM

## 2024-12-31 RX ORDER — PROPOFOL 10 MG/ML
VIAL (ML) INTRAVENOUS CONTINUOUS PRN
Status: DISCONTINUED | OUTPATIENT
Start: 2024-12-31 | End: 2024-12-31

## 2024-12-31 RX ORDER — MUPIROCIN 20 MG/G
OINTMENT TOPICAL
Status: DISCONTINUED | OUTPATIENT
Start: 2024-12-31 | End: 2024-12-31 | Stop reason: HOSPADM

## 2024-12-31 RX ORDER — OXYCODONE HYDROCHLORIDE 5 MG/1
5 TABLET ORAL
Status: DISCONTINUED | OUTPATIENT
Start: 2024-12-31 | End: 2024-12-31 | Stop reason: HOSPADM

## 2024-12-31 RX ORDER — MIDAZOLAM HYDROCHLORIDE 1 MG/ML
INJECTION INTRAMUSCULAR; INTRAVENOUS
Status: DISCONTINUED | OUTPATIENT
Start: 2024-12-31 | End: 2024-12-31

## 2024-12-31 RX ADMIN — MEPIVACAINE HYDROCHLORIDE 3 ML: 20 INJECTION, SOLUTION EPIDURAL; INFILTRATION at 07:12

## 2024-12-31 RX ADMIN — SODIUM CHLORIDE, SODIUM LACTATE, POTASSIUM CHLORIDE, AND CALCIUM CHLORIDE: .6; .31; .03; .02 INJECTION, SOLUTION INTRAVENOUS at 08:12

## 2024-12-31 RX ADMIN — OXYCODONE HYDROCHLORIDE 10 MG: 10 TABLET, FILM COATED, EXTENDED RELEASE ORAL at 06:12

## 2024-12-31 RX ADMIN — MUPIROCIN: 20 OINTMENT TOPICAL at 06:12

## 2024-12-31 RX ADMIN — PHENYLEPHRINE HYDROCHLORIDE 100 MCG: 10 INJECTION INTRAVENOUS at 07:12

## 2024-12-31 RX ADMIN — FENTANYL CITRATE 25 MCG: 50 INJECTION, SOLUTION INTRAMUSCULAR; INTRAVENOUS at 07:12

## 2024-12-31 RX ADMIN — PHENYLEPHRINE HYDROCHLORIDE 100 MCG: 10 INJECTION INTRAVENOUS at 08:12

## 2024-12-31 RX ADMIN — CELECOXIB 200 MG: 200 CAPSULE ORAL at 06:12

## 2024-12-31 RX ADMIN — SODIUM CHLORIDE, SODIUM LACTATE, POTASSIUM CHLORIDE, AND CALCIUM CHLORIDE: .6; .31; .03; .02 INJECTION, SOLUTION INTRAVENOUS at 07:12

## 2024-12-31 RX ADMIN — CEFAZOLIN 2 G: 330 INJECTION, POWDER, FOR SOLUTION INTRAMUSCULAR; INTRAVENOUS at 07:12

## 2024-12-31 RX ADMIN — FENTANYL CITRATE 100 MCG: 50 INJECTION INTRAMUSCULAR; INTRAVENOUS at 06:12

## 2024-12-31 RX ADMIN — DEXMEDETOMIDINE HYDROCHLORIDE 1 MCG: 100 INJECTION, SOLUTION, CONCENTRATE INTRAVENOUS at 07:12

## 2024-12-31 RX ADMIN — LIDOCAINE HYDROCHLORIDE 100 MG: 20 INJECTION INTRAVENOUS at 07:12

## 2024-12-31 RX ADMIN — PROPOFOL 30 MG: 10 INJECTION, EMULSION INTRAVENOUS at 07:12

## 2024-12-31 RX ADMIN — MIDAZOLAM 2 MG: 1 INJECTION INTRAMUSCULAR; INTRAVENOUS at 06:12

## 2024-12-31 RX ADMIN — SODIUM CHLORIDE, POTASSIUM CHLORIDE, SODIUM LACTATE AND CALCIUM CHLORIDE 1000 ML: 600; 310; 30; 20 INJECTION, SOLUTION INTRAVENOUS at 06:12

## 2024-12-31 RX ADMIN — CEFAZOLIN 1 G: 330 INJECTION, POWDER, FOR SOLUTION INTRAMUSCULAR; INTRAVENOUS at 09:12

## 2024-12-31 RX ADMIN — TRANEXAMIC ACID 1000 MG: 100 INJECTION, SOLUTION INTRAVENOUS at 08:12

## 2024-12-31 RX ADMIN — MIDAZOLAM HYDROCHLORIDE 1 MG: 2 INJECTION, SOLUTION INTRAMUSCULAR; INTRAVENOUS at 07:12

## 2024-12-31 RX ADMIN — TRANEXAMIC ACID 1000 MG: 100 INJECTION, SOLUTION INTRAVENOUS at 07:12

## 2024-12-31 RX ADMIN — BUPIVACAINE HYDROCHLORIDE 10 ML: 5 INJECTION, SOLUTION EPIDURAL; INTRACAUDAL; PERINEURAL at 06:12

## 2024-12-31 RX ADMIN — PROPOFOL 100 MCG/KG/MIN: 10 INJECTION, EMULSION INTRAVENOUS at 07:12

## 2024-12-31 RX ADMIN — ACETAMINOPHEN 1000 MG: 500 TABLET ORAL at 06:12

## 2024-12-31 RX ADMIN — BUPIVACAINE 20 ML: 13.3 INJECTION, SUSPENSION, LIPOSOMAL INFILTRATION at 06:12

## 2024-12-31 RX ADMIN — GLYCOPYRROLATE 0.2 MG: 0.2 INJECTION, SOLUTION INTRAMUSCULAR; INTRAVENOUS at 07:12

## 2024-12-31 NOTE — DISCHARGE INSTRUCTIONS
Joint Replacement     Hip    Knee   After surgery until first follow-up visit:    DO:  Keep leg elevated (toes above your nose) for several hours per day while recovering from surgery.  Use walker for comfort. You may put weight on affected leg as tolerated.  Minimal activity and advance diet as tolerated.  Keep operative site clean and dry for 48 hours.  Keep ice pack on the knee for 48-72 hours. Ice on for 30 minutes of each hour while awake to reduce pain and swelling.  Resume home medications.   Home health will call the day following surgery to provide follow up.   Physical therapy will be done by home health for two weeks after surgery.      DO NOT:  Do not soak in tub.  Do not drive for 24 hours or while taking narcotic pain medication.  Do not take additional tylenol/acetaminophen while taking narcotic pain medication that contains tylenol/acetaminophen.    CALL PHYSICIAN FOR ANY QUESTIONS OR CONCERNS.    You will have a follow-up appointment in 2 weeks.    Exparel(bupivacaine) has been injected to provide approximately 72 hours of reduced pain after your surgery.  Do not remove the bracelet for five days.  Report to your doctor as soon as possible if you experience any of the following:   Restlessness   Anxiety   Speech problems    Lightheadedness   Numbness and tingling of the mouth and lips   Seizures    Metallic taste   Blurred vision   Tremors    Twitching   Depression   Extreme drowsiness  Avoid additional use of local anesthetics (such as dental procedures) for five days (96 hours).

## 2024-12-31 NOTE — PLAN OF CARE
Able to moves both legs Able to bend left knee. Denies pain. Toes pink and warm with brisk cap refill. Dorsalis and posterior tibial pulse strong.

## 2024-12-31 NOTE — ANESTHESIA PREPROCEDURE EVALUATION
12/31/2024  Edy Bartlett III is a 69 y.o., male.      Pre-op Assessment    I have reviewed the Patient Summary Reports.     I have reviewed the Nursing Notes. I have reviewed the NPO Status.   I have reviewed the Medications.     Review of Systems  Anesthesia Hx:             Denies Family Hx of Anesthesia complications.    Denies Personal Hx of Anesthesia complications.                    Hematology/Oncology:                        --  Cancer in past history:              chemotherapy, surgery and radiation   Oncology Comments: Nasopharyngeal CA     Cardiovascular:     Hypertension          PVD hyperlipidemia   ECG has been reviewed.                            Musculoskeletal:         Spine Disorders: cervical Chronic Pain           Neurological:   CVA, residual symptoms        Cervical myelopathy                                Physical Exam  General: Cooperative, Alert and Oriented    Airway:  Mallampati: III   Mouth Opening: Normal  TM Distance: Normal  Neck ROM: Extension Decreased, Flexion Decreased, Left Lateral Motion Decreased, Right Lateral Motion Decreased  Multiple scars surgical  Chest/Lungs:  Normal Respiratory Rate    Heart:  Rate: Normal  Rhythm: Regular Rhythm        Anesthesia Plan  Type of Anesthesia, risks & benefits discussed:    Anesthesia Type: Spinal  Intra-op Monitoring Plan: Standard ASA Monitors  Post Op Pain Control Plan: multimodal analgesia and peripheral nerve block  Informed Consent: Informed consent signed with the Patient and all parties understand the risks and agree with anesthesia plan.  All questions answered.   ASA Score: 3    Ready For Surgery From Anesthesia Perspective.     .

## 2024-12-31 NOTE — PLAN OF CARE
Right saphenous single shot block complete per Dr. Monk with Anesthesia. Exparel band placed to pt's wrist. Pt tolerated well. See Anesthesia record for procedural notes. See flowsheet and MAR for vitals and medications. Monitors in place, alarms audible. VSS. etCO2 monitoring in place. Resp even, unlabored. Skin warm, dry. Pt in NAD. Pt awaiting transport to OR. Family at bedside. Bed in lowest, locked position. Side rails up x2. Call light within reach.

## 2024-12-31 NOTE — DISCHARGE SUMMARY
Discharge Note  Short Stay      SUMMARY     Admit Date: 12/31/2024    Attending Physician: Nelson Silva MD     Discharge Physician: Nelson Silva MD    Discharge Date: 12/31/2024 8:24 AM    Final Diagnosis: Right knee pain [M25.561]  Primary osteoarthritis of right knee [M17.11]    Hospital Course: Patient tolerated procedure well.     Disposition: Home or Self Care    Patient Instructions:   Current Discharge Medication List        CONTINUE these medications which have NOT CHANGED    Details   acetaminophen (TYLENOL) 160 MG Chew Take 160 mg by mouth every 4 (four) hours as needed.      aspirin (ECOTRIN) 81 MG EC tablet Take 1 tablet (81 mg total) by mouth once daily.  Qty: 90 tablet, Refills: 3    Associated Diagnoses: History of CVA (cerebrovascular accident)      clopidogreL (PLAVIX) 75 mg tablet Take 1 tablet (75 mg total) by mouth once daily.  Qty: 90 tablet, Refills: 1    Associated Diagnoses: History of CVA (cerebrovascular accident)      ibuprofen (ADVIL,MOTRIN) 200 MG tablet Take by mouth every 6 (six) hours as needed for Pain. Unknown dosage      ipratropium (ATROVENT) 42 mcg (0.06 %) nasal spray 2 sprays by Each Nostril route 4 (four) times daily.  Qty: 15 mL, Refills: 11    Associated Diagnoses: Vasomotor rhinitis      lisinopriL 10 MG tablet TAKE 1 TABLET BY MOUTH EVERY DAY  Qty: 90 tablet, Refills: 1    Comments: .  Associated Diagnoses: Essential hypertension      pantoprazole (PROTONIX) 40 MG tablet TAKE 1 TABLET BY MOUTH BEFORE BREAKFAST  Qty: 90 tablet, Refills: 3    Associated Diagnoses: Esophageal dysphagia      alirocumab (PRALUENT PEN) 75 mg/mL PnIj Inject 1 mL (75 mg total) into the skin every 14 (fourteen) days.  Qty: 2 mL, Refills: 12    Associated Diagnoses: History of CVA (cerebrovascular accident)      cephALEXin (KEFLEX) 500 MG capsule Take 1 capsule (500 mg total) by mouth 4 (four) times daily.  Qty: 20 capsule, Refills: 0    Associated Diagnoses: S/P right unicompartmental  "knee replacement; Primary osteoarthritis of right knee      HYDROcodone-acetaminophen (NORCO) 5-325 mg per tablet Take 1 tablet by mouth every 24 hours as needed for Pain.  Qty: 30 tablet, Refills: 0    Comments: Quantity prescribed more than 7 day supply? Yes, medically necessary for > 7 days.  Associated Diagnoses: Cervicalgia      oxyCODONE-acetaminophen (PERCOCET) 7.5-325 mg per tablet Take 1 tablet by mouth every 4 to 6 hours as needed for Pain.  Qty: 42 tablet, Refills: 0    Comments: Quantity prescribed more than 7 day supply? Yes, quantity medically necessary  Associated Diagnoses: S/P right unicompartmental knee replacement; Primary osteoarthritis of right knee             Discharge Procedure Orders (must include Diet, Follow-up, Activity):   Discharge Procedure Orders (must include Diet, Follow-up, Activity)   WALKER FOR HOME USE     Order Specific Question Answer Comments   Type of Walker: Adult (5'4"-6'6")    With wheels? No unless needed   Height: 5' 8" (1.727 m)    Weight: 79.4 kg (175 lb)    Length of need (1-99 months): 99    Platform attachment: Bilateral none needed   Please check all that apply: Patient is unable to safely ambulate without equipment.      Keep surgical extremity elevated     Ice to affected area     Remove dressing in 48 hours     Activity as tolerated     Weight bearing restrictions (specify):   Order Comments: Use walker for comfort        Follow Up:  Follow up as scheduled.  Resume routine diet.  Activity as tolerated.    No driving day of procedure.        "

## 2024-12-31 NOTE — TRANSFER OF CARE
"Anesthesia Transfer of Care Note    Patient: Edy Bartlett III    Procedure(s) Performed: Procedure(s) (LRB):  ARTHROPLASTY, KNEE, UNICOMPARTMENTAL (Right)    Patient location: PACU    Anesthesia Type: spinal    Transport from OR: Transported from OR on room air with adequate spontaneous ventilation    Post pain: adequate analgesia    Post assessment: tolerated procedure well and no apparent anesthetic complications    Post vital signs: stable    Level of consciousness: responds to stimulation    Nausea/Vomiting: no nausea/vomiting    Complications: none    Transfer of care protocol was followed    Last vitals: Visit Vitals  /68 (BP Location: Right arm, Patient Position: Lying)   Pulse 71   Temp 36.7 °C (98.1 °F) (Skin)   Resp 10   Ht 5' 8" (1.727 m)   Wt 79.4 kg (175 lb)   SpO2 100%   BMI 26.61 kg/m²     "

## 2024-12-31 NOTE — PLAN OF CARE
Exparel(bupivacaine) has been injected to provide approximately 72 hours of reduced pain after your surgery.  Do not remove the bracelet for five days.  Report to your doctor as soon as possible if you experience any of the following:   Restlessness   Anxiety   Speech problems    Lightheadedness   Numbness and tingling of the mouth and lips   Seizures    Metallic taste   Blurred vision   Tremors    Twitching   Depression   Extreme drowsiness  Avoid additional use of local anesthetics (such as dental procedures) for five days (96 hours).

## 2024-12-31 NOTE — OP NOTE
[facility] - Surgery     Op Report - Unicompartmental Knee Arthroplasty    DATE OF PROCEDURE: 12/31/2024      PROCEDURE: right  unicompartmental knee arthroplasty.     PREOPERATIVE DIAGNOSIS: right knee arthrosis.     POSTOPERATIVE DIAGNOSIS: right knee arthrosis.     SURGEON: Nelson Silva M.D.    Assistant: Fermin Alcocer RN CFA     ANESTHESIA: Regional     ESTIMATED BLOOD LOSS: < 20cc    FLUIDS: Crystalloid.     DRAINS: None.     TOURNIQUET TIME: Approximately 45 minutes at 300 mmHg.    Specimens:   Specimen (24h ago, onward)      None             INDICATIONS FOR PROCEDURE: [unfilled]  [unfilled] year old male with debilitating  right knee pain that has filled a long standing non-operative course and is interested in knee arthroplasty.        PROCEDURE IN DETAIL: After obtaining informed consent and starting the patient   on preoperative IV antibiotics, the patient was taken back to the Operating Room  where regional anesthesia was performed.  The right  lower extremity was prepped and draped in normal sterile fashion. An Esmarch bandage was used to   exsanguinate the right lower extremity and the pneumatic tourniquet was inflated to 300 mmHg. A longitudinal incision was made beginning at the mid pole of the medial aspect of the patella extending down to below the joint line, cut down to the dermal layer, opened the extensor retinaculum medially. Took down the soft tissues off the proximal medial tibia, took down the anterior horn of the medial meniscus,   took down the osteophytes off the medial femoral condyle. Got a good look at the notch. The ACL was intact. The lateral compartment looked good. We went ahead and   used an extramedullary alignment guide to make a proximal tibial resection   perpendicular to the mechanical axis, resecting about 7 mm of bone off the   proximal tibia, sized it up to a size D. We then checked our flexion gap which  was 3. We then used our extramedullary alignment guide to make  our  holes for a distal femoral cutting block, size Medium. We went ahead and made our   posterior femoral cut. Flexion gap was set at 3 and extension gap was at 0. We then milled the distal femur with 3 spigots to balance our flexion and extension gaps symmetrically at 3. We then went ahead and prepared for our tibial keel, size D and trial components were then placed. Had good range of motion and no impingement in full extension. We went ahead and Pulsavac irrigated the cancellous bone, vacuum mixed antibiotic-impregnated cement and cemented our   tibial component size D, removed excess cement and cemented our femoral   component size Medium, removed excess cement and then placed our 3 mobile bearing polyethylene spacer.  Closed the retinaculum with 1 Vicryl   figure-of-eight sutures with the knee in slight flexion, subcutaneous tissues   were closed with 2-0 Vicryl, subcuticular layer closed with a running 3-0   Prolene. Sterile dressing was applied. Tourniquet was deflated.

## 2024-12-31 NOTE — ANESTHESIA PROCEDURE NOTES
Spinal    Diagnosis: DJD knee right  Patient location during procedure: OR  Start time: 12/31/2024 7:11 AM  Timeout: 12/31/2024 7:11 AM  End time: 12/31/2024 7:15 AM    Staffing  Authorizing Provider: Vinny Monk MD  Performing Provider: Vinny Monk MD    Staffing  Performed by: Vinny Monk MD  Authorized by: Vinny Monk MD    Preanesthetic Checklist  Completed: patient identified, IV checked, site marked, risks and benefits discussed, surgical consent, monitors and equipment checked, pre-op evaluation and timeout performed  Spinal Block  Patient position: sitting  Prep: ChloraPrep  Patient monitoring: cardiac monitor, continuous pulse ox, continuous capnometry and frequent blood pressure checks  Approach: midline  Location: L3-4  Injection technique: single shot  CSF Fluid: clear free-flowing CSF  Needle  Needle type: pencil-tip   Needle gauge: 25 G  Needle length: 3.5 in  Additional Documentation: incremental injection, negative aspiration for heme and no paresthesia on injection  Needle localization: anatomical landmarks  Assessment  Sensory level: T7   Dermatomal levels determined by alcohol wipe  Ease of block: easy  Patient's tolerance of the procedure: comfortable throughout block and no complaints  Additional Notes  Isobaric 52.5 mg of 1.5% Mepiv. injected  Medications:    Medications: mepivacaine (CARBOCAINE) injection 20 mg/mL (2%) - Intrathecal   3 mL - 12/31/2024 7:13:00 AM

## 2024-12-31 NOTE — ANESTHESIA PROCEDURE NOTES
Peripheral Block    Patient location during procedure: pre-op   Block not for primary anesthetic.  Reason for block: at surgeon's request and post-op pain management   Post-op Pain Location: knee right   Start time: 12/31/2024 6:49 AM  Timeout: 12/31/2024 6:48 AM   End time: 12/31/2024 6:52 AM    Staffing  Authorizing Provider: Vinny Monk MD  Performing Provider: Vinny Monk MD    Staffing  Performed by: Vinny Monk MD  Authorized by: Vinny Monk MD    Preanesthetic Checklist  Completed: patient identified, IV checked, site marked, risks and benefits discussed, surgical consent, monitors and equipment checked, pre-op evaluation and timeout performed  Peripheral Block  Patient position: supine  Prep: ChloraPrep  Patient monitoring: heart rate, cardiac monitor, continuous pulse ox, continuous capnometry and frequent blood pressure checks  Block type: adductor canal  Laterality: right  Injection technique: single shot  Needle  Needle type: Stimuplex   Needle gauge: 21 G  Needle length: 4 in  Needle localization: anatomical landmarks and ultrasound guidance   -ultrasound image captured on disc.  Assessment  Injection assessment: negative aspiration, negative parasthesia and local visualized surrounding nerve  Paresthesia pain: none  Heart rate change: no  Slow fractionated injection: yes  Pain Tolerance: comfortable throughout block and no complaints  Medications:    Medications: BUPivacaine liposome (PF) 1.3 % (13.3 mg/mL) suspension - Injection   20 mL - 12/31/2024 6:51:00 AM  bupivacaine (pf) (MARCAINE) injection 0.5% - Perineural   10 mL - 12/31/2024 6:51:00 AM    Additional Notes  VSS.  [facility] RN monitoring vitals throughout procedure.  Patient tolerated procedure well.

## 2025-01-01 NOTE — ANESTHESIA POSTPROCEDURE EVALUATION
Anesthesia Post Evaluation    Patient: Edy Bartlett III    Procedure(s) Performed: Procedure(s) (LRB):  ARTHROPLASTY, KNEE, UNICOMPARTMENTAL (Right)    Final Anesthesia Type: spinal      Patient location during evaluation: PACU  Patient participation: Yes- Able to Participate  Level of consciousness: awake and alert  Post-procedure vital signs: reviewed and stable  Pain management: adequate  Airway patency: patent    PONV status at discharge: No PONV  Anesthetic complications: no      Cardiovascular status: blood pressure returned to baseline  Respiratory status: unassisted  Hydration status: euvolemic  Follow-up not needed.              Vitals Value Taken Time   /98 12/31/24 1130   Temp 36.6 °C (97.8 °F) 12/31/24 1130   Pulse 80 12/31/24 1130   Resp 14 12/31/24 1030   SpO2 96 % 12/31/24 1130         Event Time   Out of Recovery 08:54:53         Pain/Ciara Score: Pain Rating Prior to Med Admin: 2 (12/31/2024  6:48 AM)  Pain Rating Post Med Admin: 0 (12/31/2024  6:53 AM)  Ciara Score: 10 (12/31/2024 11:30 AM)

## 2025-01-02 VITALS
OXYGEN SATURATION: 96 % | TEMPERATURE: 98 F | WEIGHT: 175 LBS | HEIGHT: 68 IN | RESPIRATION RATE: 14 BRPM | DIASTOLIC BLOOD PRESSURE: 98 MMHG | SYSTOLIC BLOOD PRESSURE: 175 MMHG | BODY MASS INDEX: 26.52 KG/M2 | HEART RATE: 80 BPM

## 2025-01-06 ENCOUNTER — TELEPHONE (OUTPATIENT)
Dept: ORTHOPEDICS | Facility: CLINIC | Age: 70
End: 2025-01-06
Payer: MEDICARE

## 2025-01-06 NOTE — TELEPHONE ENCOUNTER
Spoke with pt regarding appt that's scheduled on 1/16 at 2:15 pm, pt verbalized understanding his appt time and date.

## 2025-01-10 ENCOUNTER — TELEPHONE (OUTPATIENT)
Dept: ORTHOPEDICS | Facility: CLINIC | Age: 70
End: 2025-01-10
Payer: MEDICARE

## 2025-01-10 NOTE — TELEPHONE ENCOUNTER
Spoke with pt regarding pain patch instead of prescription. Informed pt I spoke with dr parry and he said pain patch was fine on back of knee. Pt verbalized understanding.

## 2025-01-10 NOTE — TELEPHONE ENCOUNTER
----- Message from Terrence sent at 1/10/2025 10:34 AM CST -----  Patient would like a callback regarding questions about using a pain patch for his post op pain instead of Oxycodone      [id number] [place] - [address]  [place] [address]  Phone: [phone] Fax: [phone]    Patient--  [phone]

## 2025-01-16 ENCOUNTER — OFFICE VISIT (OUTPATIENT)
Dept: ORTHOPEDICS | Facility: CLINIC | Age: 70
End: 2025-01-16
Payer: MEDICARE

## 2025-01-16 VITALS — HEIGHT: 68 IN | BODY MASS INDEX: 26.53 KG/M2 | WEIGHT: 175.06 LBS

## 2025-01-16 DIAGNOSIS — R33.9 URINARY RETENTION: ICD-10-CM

## 2025-01-16 DIAGNOSIS — Z96.651 S/P RIGHT UNICOMPARTMENTAL KNEE REPLACEMENT: Primary | ICD-10-CM

## 2025-01-16 PROCEDURE — 1125F AMNT PAIN NOTED PAIN PRSNT: CPT | Mod: CPTII,S$GLB,, | Performed by: ORTHOPAEDIC SURGERY

## 2025-01-16 PROCEDURE — 99024 POSTOP FOLLOW-UP VISIT: CPT | Mod: S$GLB,,, | Performed by: ORTHOPAEDIC SURGERY

## 2025-01-16 PROCEDURE — 1159F MED LIST DOCD IN RCRD: CPT | Mod: CPTII,S$GLB,, | Performed by: ORTHOPAEDIC SURGERY

## 2025-01-16 PROCEDURE — 99999 PR PBB SHADOW E&M-EST. PATIENT-LVL III: CPT | Mod: PBBFAC,,, | Performed by: ORTHOPAEDIC SURGERY

## 2025-01-16 RX ORDER — OXYCODONE AND ACETAMINOPHEN 7.5; 325 MG/1; MG/1
1 TABLET ORAL
Qty: 42 TABLET | Refills: 0 | Status: SHIPPED | OUTPATIENT
Start: 2025-01-16

## 2025-01-16 RX ORDER — OXYCODONE AND ACETAMINOPHEN 7.5; 325 MG/1; MG/1
1 TABLET ORAL
Qty: 42 TABLET | Refills: 0 | Status: CANCELLED | OUTPATIENT
Start: 2025-01-16

## 2025-01-17 ENCOUNTER — TELEPHONE (OUTPATIENT)
Dept: ORTHOPEDICS | Facility: CLINIC | Age: 70
End: 2025-01-17
Payer: MEDICARE

## 2025-01-17 NOTE — TELEPHONE ENCOUNTER
----- Message from Med Assistant Mason sent at 1/17/2025  3:00 PM CST -----  Contact: pt  Rx pain   Pharmacy [facility]   Call back

## 2025-01-17 NOTE — TELEPHONE ENCOUNTER
Contacted patient. All questions and concerns have been answered about pain prescription. Informed him Dr. Silva gave him a printed rx [facility] to [facility]. Patient verbalized understanding.

## 2025-01-31 ENCOUNTER — TELEPHONE (OUTPATIENT)
Dept: OTOLARYNGOLOGY | Facility: CLINIC | Age: 70
End: 2025-01-31
Payer: MEDICARE

## 2025-01-31 NOTE — TELEPHONE ENCOUNTER
----- Message from Debra sent at 1/31/2025 12:35 PM CST -----  Regarding: Appt  Contact: [phone]  Type:  Sooner Apoointment Request    Caller is requesting a sooner appointment.  Caller declined first available appointment listed below.  Caller will not accept being placed on the waitlist and is requesting a message be sent to doctor.  Name of Caller: Edy  When is the first available appointment? 3/31  Symptoms:Earache  Would the patient rather a call back or a response via [facility]? Call  Best Call Back Number:[phone]  Additional Information:

## 2025-02-04 ENCOUNTER — HOSPITAL ENCOUNTER (OUTPATIENT)
Dept: RADIOLOGY | Facility: HOSPITAL | Age: 70
Discharge: HOME | End: 2025-02-04
Attending: THORACIC SURGERY (CARDIOTHORACIC VASCULAR SURGERY)
Payer: MEDICARE

## 2025-02-04 DIAGNOSIS — I65.23 BILATERAL CAROTID ARTERY STENOSIS: ICD-10-CM

## 2025-02-04 DIAGNOSIS — Z98.890 S/P CAROTID ENDARTERECTOMY: ICD-10-CM

## 2025-02-04 DIAGNOSIS — Z98.890 S/P CAROTID ENDARTERECTOMY: Primary | ICD-10-CM

## 2025-02-04 PROCEDURE — 93880 EXTRACRANIAL BILAT STUDY: CPT | Mod: 26,,, | Performed by: STUDENT IN AN ORGANIZED HEALTH CARE EDUCATION/TRAINING PROGRAM

## 2025-02-04 PROCEDURE — 93880 EXTRACRANIAL BILAT STUDY: CPT | Mod: TC,PO

## 2025-02-05 ENCOUNTER — OFFICE VISIT (OUTPATIENT)
Dept: OTOLARYNGOLOGY | Facility: CLINIC | Age: 70
End: 2025-02-05
Payer: MEDICARE

## 2025-02-05 VITALS — BODY MASS INDEX: 27.06 KG/M2 | WEIGHT: 178 LBS

## 2025-02-05 DIAGNOSIS — T66.XXXS LATE EFFECT OF RADIATION: ICD-10-CM

## 2025-02-05 DIAGNOSIS — H60.312 CHRONIC DIFFUSE OTITIS EXTERNA OF LEFT EAR: Primary | ICD-10-CM

## 2025-02-05 DIAGNOSIS — L29.9 ITCHING OF EAR: ICD-10-CM

## 2025-02-05 DIAGNOSIS — H61.22 IMPACTED CERUMEN OF LEFT EAR: ICD-10-CM

## 2025-02-05 PROCEDURE — 69210 REMOVE IMPACTED EAR WAX UNI: CPT | Mod: S$GLB,,, | Performed by: NURSE PRACTITIONER

## 2025-02-05 PROCEDURE — 1126F AMNT PAIN NOTED NONE PRSNT: CPT | Mod: CPTII,S$GLB,, | Performed by: NURSE PRACTITIONER

## 2025-02-05 PROCEDURE — 99213 OFFICE O/P EST LOW 20 MIN: CPT | Mod: 25,S$GLB,, | Performed by: NURSE PRACTITIONER

## 2025-02-05 PROCEDURE — 1160F RVW MEDS BY RX/DR IN RCRD: CPT | Mod: CPTII,S$GLB,, | Performed by: NURSE PRACTITIONER

## 2025-02-05 PROCEDURE — 3008F BODY MASS INDEX DOCD: CPT | Mod: CPTII,S$GLB,, | Performed by: NURSE PRACTITIONER

## 2025-02-05 PROCEDURE — 3288F FALL RISK ASSESSMENT DOCD: CPT | Mod: CPTII,S$GLB,, | Performed by: NURSE PRACTITIONER

## 2025-02-05 PROCEDURE — 1101F PT FALLS ASSESS-DOCD LE1/YR: CPT | Mod: CPTII,S$GLB,, | Performed by: NURSE PRACTITIONER

## 2025-02-05 PROCEDURE — 1159F MED LIST DOCD IN RCRD: CPT | Mod: CPTII,S$GLB,, | Performed by: NURSE PRACTITIONER

## 2025-02-05 PROCEDURE — 99999 PR PBB SHADOW E&M-EST. PATIENT-LVL III: CPT | Mod: PBBFAC,,, | Performed by: NURSE PRACTITIONER

## 2025-02-05 NOTE — PROGRESS NOTES
"Subjective:       Patient ID: Edy Bartlett III is a 69 y.o. male.    Chief Complaint: Ear Fullness and Otalgia    Ear Fullness     Otalgia     Patient's culture grew (+)Proteus mirabilis and Corynebacterium amycolatum 9 months ago; treated with Gentian violet, then Ciprodex gtts.     Patient returns today for left aural pruritus. Left ear was hurting a couple of weeks ago, resolved.     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 Treated with radiation and chemo at [facility]. Patient states he is dealing with the after-effects of radiation burns to his left ear canal/jaw/teeth. Patient has area of exposed bone in left EAC secondary to radiation effects. His last audiogram here was 09/10/2020: Right type "A" with high-frequency SNHL. Left type "B" with mild to severe/profound mixed HL.          Review of Systems   Constitutional: Negative.    HENT:  Positive for ear pain.    Eyes: Negative.    Cardiovascular: Negative.    Gastrointestinal: Negative.    Musculoskeletal: Negative.    Integumentary:  Negative.   Neurological: Negative.    Hematological: Negative.    Psychiatric/Behavioral: Negative.           Objective:      Physical Exam  Vitals and nursing note reviewed.   Constitutional:       General: He is not in acute distress.     Appearance: He is well-developed. He is not ill-appearing or diaphoretic.   HENT:      Head: Normocephalic and atraumatic.      Right Ear: Tympanic membrane, ear canal and external ear normal. Decreased hearing noted. No middle ear effusion. Tympanic membrane is not erythematous.      Left Ear: Ear canal and external ear normal. Decreased hearing noted. Tympanic membrane is scarred. Tympanic membrane is not erythematous.      Nose: Nose normal.      Mouth/Throat:      Mouth: No oral lesions.      Dentition: Abnormal dentition. Dental caries present.      " Tongue: No lesions.      Palate: No lesions.      Pharynx: Uvula midline. No posterior oropharyngeal erythema.      Tonsils: No tonsillar exudate.   Eyes:      General: Lids are normal. No scleral icterus.        Right eye: No discharge.         Left eye: No discharge.   Neck:      Trachea: Trachea normal. No tracheal deviation.   Cardiovascular:      Rate and Rhythm: Normal rate.   Pulmonary:      Effort: Pulmonary effort is normal. No respiratory distress.      Breath sounds: No stridor. No wheezing.   Musculoskeletal:         General: Normal range of motion.      Cervical back: Normal range of motion and neck supple.   Skin:     General: Skin is warm and dry.      Coloration: Skin is not pale.   Neurological:      Mental Status: He is alert and oriented to person, place, and time.      Coordination: Coordination normal.      Gait: Gait normal.   Psychiatric:         Speech: Speech normal.         Behavior: Behavior normal. Behavior is cooperative.         Thought Content: Thought content normal.         Judgment: Judgment normal.      SEPARATE PROCEDURE IN OFFICE:   Procedure: Removal of impacted cerumen, Left  Pre Procedure Diagnosis: Cerumen Impaction   Post Procedure Diagnosis: Cerumen Impaction   Verbal informed consent in regards to risk of trauma to ear canal, ear drum or hearing, discomfort during procedure and/or inability to remove cerumen impaction in one session or unforeseen events or complications.   No anesthesia.     Procedure in detail:   Ear canal visualized bilateral with appropriate size ear speculum utilizing Operating Head Binocular Otomicroscope   Utilizing the following: Suction cannula and ring curet was used AS. The impacted cerumen of the ear canals was removed atraumatically. The TM and EAC were then inspected and found to be clear of wax. See description of TMs/EACs in PE above.   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   Audiogram at his convenience. Needs to consider hearing aids.     Return in 4 months for ear cleaning

## 2025-02-11 DIAGNOSIS — Z96.651 S/P RIGHT UNICOMPARTMENTAL KNEE REPLACEMENT: Primary | ICD-10-CM

## 2025-02-12 ENCOUNTER — HOSPITAL ENCOUNTER (OUTPATIENT)
Dept: RADIOLOGY | Facility: HOSPITAL | Age: 70
Discharge: HOME | End: 2025-02-12
Attending: ORTHOPAEDIC SURGERY
Payer: MEDICARE

## 2025-02-12 ENCOUNTER — OFFICE VISIT (OUTPATIENT)
Dept: ORTHOPEDICS | Facility: CLINIC | Age: 70
End: 2025-02-12
Payer: MEDICARE

## 2025-02-12 DIAGNOSIS — Z96.651 S/P RIGHT UNICOMPARTMENTAL KNEE REPLACEMENT: ICD-10-CM

## 2025-02-12 DIAGNOSIS — Z96.651 S/P RIGHT UNICOMPARTMENTAL KNEE REPLACEMENT: Primary | ICD-10-CM

## 2025-02-12 PROCEDURE — 73560 X-RAY EXAM OF KNEE 1 OR 2: CPT | Mod: 26,59,LT, | Performed by: RADIOLOGY

## 2025-02-12 PROCEDURE — 99999 PR PBB SHADOW E&M-EST. PATIENT-LVL II: CPT | Mod: PBBFAC,,, | Performed by: ORTHOPAEDIC SURGERY

## 2025-02-12 PROCEDURE — 73562 X-RAY EXAM OF KNEE 3: CPT | Mod: 26,RT,, | Performed by: RADIOLOGY

## 2025-02-12 PROCEDURE — 99024 POSTOP FOLLOW-UP VISIT: CPT | Mod: S$GLB,,, | Performed by: ORTHOPAEDIC SURGERY

## 2025-02-12 PROCEDURE — 1159F MED LIST DOCD IN RCRD: CPT | Mod: CPTII,S$GLB,, | Performed by: ORTHOPAEDIC SURGERY

## 2025-02-12 PROCEDURE — 73560 X-RAY EXAM OF KNEE 1 OR 2: CPT | Mod: TC,PO,LT

## 2025-02-12 PROCEDURE — 1125F AMNT PAIN NOTED PAIN PRSNT: CPT | Mod: CPTII,S$GLB,, | Performed by: ORTHOPAEDIC SURGERY

## 2025-03-19 ENCOUNTER — OFFICE VISIT (OUTPATIENT)
Dept: FAMILY MEDICINE | Facility: CLINIC | Age: 70
End: 2025-03-19
Payer: MEDICARE

## 2025-03-19 VITALS
WEIGHT: 175.5 LBS | DIASTOLIC BLOOD PRESSURE: 86 MMHG | SYSTOLIC BLOOD PRESSURE: 130 MMHG | HEIGHT: 68 IN | BODY MASS INDEX: 26.6 KG/M2 | HEART RATE: 60 BPM | OXYGEN SATURATION: 94 %

## 2025-03-19 DIAGNOSIS — E78.2 MIXED HYPERLIPIDEMIA: ICD-10-CM

## 2025-03-19 DIAGNOSIS — M54.2 CERVICALGIA: ICD-10-CM

## 2025-03-19 DIAGNOSIS — I65.21 RIGHT CAROTID ARTERY OCCLUSION: ICD-10-CM

## 2025-03-19 DIAGNOSIS — L98.9 SKIN LESION: ICD-10-CM

## 2025-03-19 DIAGNOSIS — I10 ESSENTIAL HYPERTENSION: ICD-10-CM

## 2025-03-19 DIAGNOSIS — Z00.00 WELLNESS EXAMINATION: Primary | ICD-10-CM

## 2025-03-19 DIAGNOSIS — Z86.73 HISTORY OF CVA (CEREBROVASCULAR ACCIDENT): ICD-10-CM

## 2025-03-19 DIAGNOSIS — N52.1 ERECTILE DYSFUNCTION DUE TO DISEASES CLASSIFIED ELSEWHERE: ICD-10-CM

## 2025-03-19 DIAGNOSIS — R97.20 ELEVATED PSA, BETWEEN 10 AND LESS THAN 20 NG/ML: ICD-10-CM

## 2025-03-19 PROBLEM — R13.10 DYSPHAGIA: Status: RESOLVED | Noted: 2024-02-08 | Resolved: 2025-03-19

## 2025-03-19 PROCEDURE — 99999 PR PBB SHADOW E&M-EST. PATIENT-LVL IV: CPT | Mod: PBBFAC,,, | Performed by: INTERNAL MEDICINE

## 2025-03-19 RX ORDER — HYDROCODONE BITARTRATE AND ACETAMINOPHEN 5; 325 MG/1; MG/1
1 TABLET ORAL
Qty: 30 TABLET | Refills: 0 | Status: SHIPPED | OUTPATIENT
Start: 2025-03-19

## 2025-03-19 NOTE — PROGRESS NOTES
[facility]  Primary Care   [address] [facility]       Patient ID: Edy Bartlett III     Chief Complaint:   Chief Complaint   Patient presents with    Wellness     3 month follow up        HPI:  Annual exam.  Since our last office visit he did get a successful right partial knee replacement and it looks like it is doing well.  He did have an episode shortly after surgery where he could not urinate so went to [facility] and got a catheter for some time and it was eventually removed.  Repeat PSA in the latter part of January was elevated at 10.4 but this is near his baseline.  I would like to refer him to a new urologist for a secondary evaluation which may include an MRI but he will discuss with the them after he sees them.  I think he has a large prostate which is known and is status post UroLift so I am not surprised that his PSA is this high but at least it is stable.  He does request a referral to Dermatology for some various skin lesions so I have placed that.  He did see another cardiologist due to hypertension with a previous history of orthostatic hypotension.  I added low-dose lisinopril at our last office visit due to some episodic blood pressure spikes and he was concerned about his blood pressure variability because he had a previous history of syncope with a blood pressure overmedication.  Cardiologist stopped lisinopril and instead gave him losartan 25 mg to take 1/2 of each day.  I would like to be more conservative and asked him to check his blood pressure every day and if his systolic blood pressure is 140 are but he can take half of a losartan 25 mg pill but if that is not I want him to take nothing.  In this way some day so get medicine and some days he well and will probably get around the side effect of passing out.  I would like to get some colon cancer screening on him but he politely declines because a lot of family issues right now.  We will try again at a later  "date.  He does request a refill on hydrocodone since the oxycodone that he was given after his knee replacement had a missed with the mind.  He takes 1 pill every now and again so I have given him a prescription for 30.  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  They will repeat another ultrasound in 4-6 months.    Review of Systems       Negative    Objective:      Physical Exam   Physical Exam       Normal     Vitals:   Vitals:    03/19/25 1602   BP: 130/86   Pulse: 60   SpO2: (!) 94%   Weight: 79.6 kg (175 lb 7.8 oz)   Height: 5' 8" (1.727 m)        Assessment:           Plan:       Edy Bartlett III  was seen today for follow-up and may need lab work.    Diagnoses and all orders for this visit:    Edy was seen today for wellness.    Diagnoses and all orders for this visit:    Wellness examination    Cervicalgia  -     HYDROcodone-acetaminophen (NORCO) 5-325 mg per tablet; Take 1 tablet by mouth every 24 hours as needed for Pain.  Monitor on medicines    History of CVA (cerebrovascular accident)  Stable with blood pressure control and we will try 1/2 of a losartan 25 mg each day if his systolic blood pressure is I 40.  Continue aspirin and Plavix and Praulent.    Essential hypertension  Blood pressure looks good today but I do not want to overmedicate him so he will check his blood pressure each day and if the systolic is greater than 140 he will take 1/2 of a losartan 25 mg    Skin lesion  -     Ambulatory referral/consult to Dermatology; Future    Elevated PSA, between 10 and less than 20 ng/ml  -     Ambulatory referral/consult to Urology; Future    Erectile dysfunction due to diseases classified elsewhere  He does have Viagra available but it maybe kind " of dated.  I would like him to try it and let me know how it works    Mixed hyperlipidemia  Monitor on Praulent     Visit today included increased complexity associated with the care of the episodic problem hypertension cervicalgia elevated PSA hyperlipidemia addressed and managing the longitudinal care of the patient due to the serious and/or complex managed problem(s) .           Huy Cordova MD

## 2025-03-30 DIAGNOSIS — I10 ESSENTIAL HYPERTENSION: ICD-10-CM

## 2025-03-30 NOTE — TELEPHONE ENCOUNTER
No care due was identified.  [facility] Embedded Care Due Messages. Reference number: [id number].   3/30/2025 3:56:39 PM CDT

## 2025-03-31 RX ORDER — LISINOPRIL 10 MG/1
10 TABLET ORAL
Qty: 90 TABLET | Refills: 1 | OUTPATIENT
Start: 2025-03-31

## 2025-03-31 NOTE — TELEPHONE ENCOUNTER
Refill Decision Note   Edy Dhruv  is requesting a refill authorization.  Brief Assessment and Rationale for Refill:  Quick Discontinue     Medication Therapy Plan:  now on losartan    Medication Reconciliation Completed: No   Comments:     No Care Gaps recommended.     Note composed:12:32 PM 03/31/2025

## 2025-04-30 DIAGNOSIS — R73.03 PREDIABETES: ICD-10-CM

## 2025-05-21 ENCOUNTER — OFFICE VISIT (OUTPATIENT)
Dept: FAMILY MEDICINE | Facility: CLINIC | Age: 70
End: 2025-05-21
Payer: MEDICARE

## 2025-05-21 ENCOUNTER — HOSPITAL ENCOUNTER (OUTPATIENT)
Dept: RADIOLOGY | Facility: HOSPITAL | Age: 70
Discharge: HOME | End: 2025-05-21
Attending: INTERNAL MEDICINE
Payer: MEDICARE

## 2025-05-21 VITALS
BODY MASS INDEX: 26.4 KG/M2 | DIASTOLIC BLOOD PRESSURE: 88 MMHG | SYSTOLIC BLOOD PRESSURE: 126 MMHG | OXYGEN SATURATION: 96 % | HEIGHT: 68 IN | HEART RATE: 66 BPM | WEIGHT: 174.19 LBS

## 2025-05-21 DIAGNOSIS — M25.561 CHRONIC PAIN OF RIGHT KNEE: ICD-10-CM

## 2025-05-21 DIAGNOSIS — M54.2 CERVICALGIA: ICD-10-CM

## 2025-05-21 DIAGNOSIS — M54.2 CERVICALGIA: Primary | ICD-10-CM

## 2025-05-21 DIAGNOSIS — M54.50 CHRONIC BILATERAL LOW BACK PAIN WITHOUT SCIATICA: ICD-10-CM

## 2025-05-21 DIAGNOSIS — G89.29 CHRONIC PAIN OF RIGHT KNEE: ICD-10-CM

## 2025-05-21 DIAGNOSIS — G89.29 CHRONIC BILATERAL LOW BACK PAIN WITHOUT SCIATICA: ICD-10-CM

## 2025-05-21 DIAGNOSIS — N52.1 ERECTILE DYSFUNCTION DUE TO DISEASES CLASSIFIED ELSEWHERE: ICD-10-CM

## 2025-05-21 PROCEDURE — 72050 X-RAY EXAM NECK SPINE 4/5VWS: CPT | Mod: TC,FY,PO

## 2025-05-21 PROCEDURE — 3074F SYST BP LT 130 MM HG: CPT | Mod: CPTII,S$GLB,, | Performed by: INTERNAL MEDICINE

## 2025-05-21 PROCEDURE — 3079F DIAST BP 80-89 MM HG: CPT | Mod: CPTII,S$GLB,, | Performed by: INTERNAL MEDICINE

## 2025-05-21 PROCEDURE — 99214 OFFICE O/P EST MOD 30 MIN: CPT | Mod: S$GLB,,, | Performed by: INTERNAL MEDICINE

## 2025-05-21 PROCEDURE — 72050 X-RAY EXAM NECK SPINE 4/5VWS: CPT | Mod: 26,,, | Performed by: RADIOLOGY

## 2025-05-21 PROCEDURE — 3288F FALL RISK ASSESSMENT DOCD: CPT | Mod: CPTII,S$GLB,, | Performed by: INTERNAL MEDICINE

## 2025-05-21 PROCEDURE — 1160F RVW MEDS BY RX/DR IN RCRD: CPT | Mod: CPTII,S$GLB,, | Performed by: INTERNAL MEDICINE

## 2025-05-21 PROCEDURE — 4010F ACE/ARB THERAPY RXD/TAKEN: CPT | Mod: CPTII,S$GLB,, | Performed by: INTERNAL MEDICINE

## 2025-05-21 PROCEDURE — 1101F PT FALLS ASSESS-DOCD LE1/YR: CPT | Mod: CPTII,S$GLB,, | Performed by: INTERNAL MEDICINE

## 2025-05-21 PROCEDURE — 3008F BODY MASS INDEX DOCD: CPT | Mod: CPTII,S$GLB,, | Performed by: INTERNAL MEDICINE

## 2025-05-21 PROCEDURE — 1159F MED LIST DOCD IN RCRD: CPT | Mod: CPTII,S$GLB,, | Performed by: INTERNAL MEDICINE

## 2025-05-21 PROCEDURE — 99999 PR PBB SHADOW E&M-EST. PATIENT-LVL III: CPT | Mod: PBBFAC,,, | Performed by: INTERNAL MEDICINE

## 2025-05-21 RX ORDER — HYDROCODONE BITARTRATE AND ACETAMINOPHEN 5; 325 MG/1; MG/1
1 TABLET ORAL
Qty: 30 TABLET | Refills: 0 | Status: SHIPPED | OUTPATIENT
Start: 2025-05-21

## 2025-05-21 RX ORDER — SILDENAFIL 100 MG/1
100 TABLET, FILM COATED ORAL DAILY PRN
Qty: 10 TABLET | Refills: 3 | Status: SHIPPED | OUTPATIENT
Start: 2025-05-21 | End: 2026-05-21

## 2025-05-21 RX ORDER — PREDNISONE 20 MG/1
20 TABLET ORAL DAILY
Qty: 5 TABLET | Refills: 0 | Status: SHIPPED | OUTPATIENT
Start: 2025-05-21 | End: 2025-05-26

## 2025-05-21 NOTE — PROGRESS NOTES
"[facility]  Primary Care   [address]       Patient ID: Edy Bartlett III     Chief Complaint:   Chief Complaint   Patient presents with    Back Pain    Knee Pain     foot    foot pain        HPI:  Patient complains of pain in various areas of his body.  Starting up top, he has mandibular pain as well as dry mouth and poor dentition but we think is all due to the radiation he got in that area.  I am not sure if anything can be done for that pain and he has seen an ENT in the past.  His neck will pop quite loudly and he gets significant pain rash with that.  We have never imaged his cervical spine before, so I will get an x-ray today.  He has chronic lower back pain that has been imaged in the past and a fact he was getting epidural steroid injections and even rhizotomy in that area but that has not been for little while now.  That pain that is starting to exacerbate despite taking hydrocodone 5/325 sparingly.  He also gets foot pain that is likely due to a neuropathy.  Other issues include tinnitus mainly in his left ear.  I will refer him back to pain management because he is interested in epidural steroid injections and for now I will refill the hydrocodone and give him a short course of prednisone to see if we can calm down so that is inflammation.  I do hope it works.    Review of Systems       Pain in various areas     Objective:      Physical Exam   Physical Exam       Ambulates with cane     Vitals:   Vitals:    05/21/25 1035   BP: 126/88   Pulse: 66   SpO2: 96%   Weight: 79 kg (174 lb 2.6 oz)   Height: 5' 8" (1.727 m)        Assessment:           Plan:       Edy Bartlett III  was seen today for follow-up and may need lab work.    Diagnoses and all orders for this visit:    Edy was seen today for back pain, knee pain and foot pain.    Diagnoses and all orders for this visit:    Cervicalgia  -     Ambulatory referral/consult to Pain Clinic; Future  -     predniSONE " (DELTASONE) 20 MG tablet; Take 1 tablet (20 mg total) by mouth once daily. for 5 days  -     HYDROcodone-acetaminophen (NORCO) 5-325 mg per tablet; Take 1 tablet by mouth every 24 hours as needed for Pain.  -     X-Ray Cervical Spine Complete 5 view; Future  Monitor pain and may need FARZANEH     Chronic bilateral low back pain without sciatica  -     Ambulatory referral/consult to Pain Clinic; Future  -     predniSONE (DELTASONE) 20 MG tablet; Take 1 tablet (20 mg total) by mouth once daily. for 5 days  May need an FARZANEH     Chronic pain of right knee  Plan per Ortho     Erectile dysfunction due to diseases classified elsewhere  -     sildenafiL (VIAGRA) 100 MG tablet; Take 1 tablet (100 mg total) by mouth daily as needed for Erectile Dysfunction.  Monitor symptoms          Huy Cordova MD

## 2025-05-22 ENCOUNTER — TELEPHONE (OUTPATIENT)
Dept: PAIN MEDICINE | Facility: CLINIC | Age: 70
End: 2025-05-22
Payer: MEDICARE

## 2025-05-22 NOTE — TELEPHONE ENCOUNTER
Pain management referral received. Told him that because he hasn't been seen in 5 years, he would be considered a new patient and would need to see Dr. Caruso because Dr. Parham is not seeing new patient's. He proceeded to tell me goodbye and hang up the phone. Patient was not scheduled

## 2025-05-29 ENCOUNTER — RESULTS FOLLOW-UP (OUTPATIENT)
Dept: FAMILY MEDICINE | Facility: CLINIC | Age: 70
End: 2025-05-29

## 2025-06-10 ENCOUNTER — OFFICE VISIT (OUTPATIENT)
Dept: UROLOGY | Facility: CLINIC | Age: 70
End: 2025-06-10
Payer: MEDICARE

## 2025-06-10 VITALS — HEIGHT: 68 IN | BODY MASS INDEX: 26.8 KG/M2 | WEIGHT: 176.81 LBS

## 2025-06-10 DIAGNOSIS — N52.01 ERECTILE DYSFUNCTION DUE TO ARTERIAL INSUFFICIENCY: ICD-10-CM

## 2025-06-10 DIAGNOSIS — N13.8 BPH WITH URINARY OBSTRUCTION: Primary | ICD-10-CM

## 2025-06-10 DIAGNOSIS — N40.1 BPH WITH URINARY OBSTRUCTION: Primary | ICD-10-CM

## 2025-06-10 DIAGNOSIS — R97.20 ELEVATED PSA: ICD-10-CM

## 2025-06-10 DIAGNOSIS — N53.14 RETROGRADE EJACULATION: ICD-10-CM

## 2025-06-10 LAB
BILIRUBIN, UA POC OHS: NEGATIVE
BLOOD, UA POC OHS: NEGATIVE
CLARITY, UA POC OHS: CLEAR
COLOR, UA POC OHS: YELLOW
GLUCOSE, UA POC OHS: NEGATIVE
KETONES, UA POC OHS: NEGATIVE
LEUKOCYTES, UA POC OHS: NEGATIVE
NITRITE, UA POC OHS: NEGATIVE
PH, UA POC OHS: 6
POC RESIDUAL URINE VOLUME: 24 ML (ref 0–100)
PROTEIN, UA POC OHS: NEGATIVE
SPECIFIC GRAVITY, UA POC OHS: 1.02
UROBILINOGEN, UA POC OHS: 0.2

## 2025-06-10 PROCEDURE — 99204 OFFICE O/P NEW MOD 45 MIN: CPT | Mod: 25,S$GLB,, | Performed by: STUDENT IN AN ORGANIZED HEALTH CARE EDUCATION/TRAINING PROGRAM

## 2025-06-10 PROCEDURE — 1101F PT FALLS ASSESS-DOCD LE1/YR: CPT | Mod: CPTII,S$GLB,, | Performed by: STUDENT IN AN ORGANIZED HEALTH CARE EDUCATION/TRAINING PROGRAM

## 2025-06-10 PROCEDURE — 81003 URINALYSIS AUTO W/O SCOPE: CPT | Mod: QW,S$GLB,, | Performed by: STUDENT IN AN ORGANIZED HEALTH CARE EDUCATION/TRAINING PROGRAM

## 2025-06-10 PROCEDURE — 51798 US URINE CAPACITY MEASURE: CPT | Mod: S$GLB,,, | Performed by: STUDENT IN AN ORGANIZED HEALTH CARE EDUCATION/TRAINING PROGRAM

## 2025-06-10 PROCEDURE — 3288F FALL RISK ASSESSMENT DOCD: CPT | Mod: CPTII,S$GLB,, | Performed by: STUDENT IN AN ORGANIZED HEALTH CARE EDUCATION/TRAINING PROGRAM

## 2025-06-10 PROCEDURE — 4010F ACE/ARB THERAPY RXD/TAKEN: CPT | Mod: CPTII,S$GLB,, | Performed by: STUDENT IN AN ORGANIZED HEALTH CARE EDUCATION/TRAINING PROGRAM

## 2025-06-10 PROCEDURE — 99999 PR PBB SHADOW E&M-EST. PATIENT-LVL III: CPT | Mod: PBBFAC,,, | Performed by: STUDENT IN AN ORGANIZED HEALTH CARE EDUCATION/TRAINING PROGRAM

## 2025-06-10 PROCEDURE — 1159F MED LIST DOCD IN RCRD: CPT | Mod: CPTII,S$GLB,, | Performed by: STUDENT IN AN ORGANIZED HEALTH CARE EDUCATION/TRAINING PROGRAM

## 2025-06-10 PROCEDURE — 3008F BODY MASS INDEX DOCD: CPT | Mod: CPTII,S$GLB,, | Performed by: STUDENT IN AN ORGANIZED HEALTH CARE EDUCATION/TRAINING PROGRAM

## 2025-06-10 PROCEDURE — 1160F RVW MEDS BY RX/DR IN RCRD: CPT | Mod: CPTII,S$GLB,, | Performed by: STUDENT IN AN ORGANIZED HEALTH CARE EDUCATION/TRAINING PROGRAM

## 2025-06-10 PROCEDURE — 1126F AMNT PAIN NOTED NONE PRSNT: CPT | Mod: CPTII,S$GLB,, | Performed by: STUDENT IN AN ORGANIZED HEALTH CARE EDUCATION/TRAINING PROGRAM

## 2025-06-10 RX ORDER — DOCUSATE SODIUM 50MG AND SENNOSIDES 8.6MG 8.6; 5 MG/1; MG/1
1 TABLET, FILM COATED ORAL 2 TIMES DAILY
COMMUNITY
Start: 2025-01-03

## 2025-06-10 RX ORDER — BISACODYL 5 MG
10 TABLET, DELAYED RELEASE (ENTERIC COATED) ORAL DAILY PRN
COMMUNITY
Start: 2025-01-03

## 2025-06-10 RX ORDER — ALFUZOSIN HYDROCHLORIDE 10 MG/1
10 TABLET, EXTENDED RELEASE ORAL
Qty: 30 TABLET | Refills: 11 | Status: SHIPPED | OUTPATIENT
Start: 2025-06-10 | End: 2025-06-10

## 2025-06-10 RX ORDER — SILODOSIN 4 MG/1
4 CAPSULE ORAL DAILY
Qty: 30 CAPSULE | Refills: 11 | Status: SHIPPED | OUTPATIENT
Start: 2025-06-10 | End: 2026-06-10

## 2025-06-10 NOTE — PROGRESS NOTES
[facility]   Clinic Note    Subjective:     Chief Complaint: Elevated PSA      History of Present Illness    CHIEF COMPLAINT:  Edy presents today for follow up of elevated PSA    UROLOGIC HISTORY:  He has a history of prostate biopsy which was complicated by sepsis requiring emergency room visit with fever of 105F. He previously underwent UroLift procedure and had a trial of Flomax which was discontinued. He also has a history of urinary retention requiring catheterization following knee surgery    CURRENT URINARY SYMPTOMS:  He reports urinary urgency with occasional episodes of incontinence requiring protective undergarments. He experiences nocturia typically 4 times per night, though only one episode last night. Urinary stream strength is variable, depending on bladder volume and fluid intake.  IPSS is 22/4  PVR today is 24 mL.     SEXUAL FUNCTION:  He reports poor erections and has not been using his prescribed Viagra due to lack of opportunity. He notes absence of ejaculate during orgasm since UroLift procedure.    MEDICAL HISTORY:  He has a history of stroke and radiation treatment. He experiences tinnitus. He reports balance issues which he attributes to radiation treatment affecting his ear drums and ankle.    CURRENT MEDICATIONS:  He takes plavix, blood pressure medication, aspirin, and nasal spray.         PSA   Latest Ref Rng 0.00 - 4.00 ng/mL   10/12/2009 7.0 (H)    1/26/2023 11.3 (H)    7/26/2023 11.9 (H)    1/31/2024 10.1 (H)       IPSS Questionnaire (AUA-SS):  1) Incomplete Emptying 2 - Less than half the time   2) Frequency 3 - About half the time   3) Intermittency 4 - More than half the time   4) Urgency 5 - Almost always   5) Weak Stream  3 - About half the time   6) Straining 1 - Less than 1 time in 5   7) Nocturia 4 - 4 times   Total score:   22   Quality of Life:  4 - Mostly Dissatisfied      Past medical, family, surgical and social history reviewed as documented in chart with  "pertinent positive medical, family, surgical and social history detailed in HPI.    A review systems was conducted with pertinent positive and negative findings documented in HPI.    Objective:     Estimated body mass index is 26.88 kg/m² as calculated from the following:    Height as of this encounter: 5' 8" (1.727 m).    Weight as of this encounter: 80.2 kg (176 lb 12.9 oz).    Vital Signs (Most Recent)       General: No acute distress, well developed.   Head: Normocephalic, atraumatic  CV: no cyanosis  Lungs: normal respiratory effort, no respiratory distress  : prostate 60 g, left sided asymmetry without nodularity     Labs reviewed below:  Lab Results   Component Value Date    BUN 9 12/31/2024    CREATININE 0.83 (L) 12/31/2024    WBC 5.21 12/16/2024    HGB 16.6 12/16/2024    HCT 49.5 12/16/2024     12/16/2024    AST 17 12/31/2024    ALT 14 12/31/2024    ALKPHOS 66 12/31/2024    ALBUMIN 3.7 12/31/2024    HGBA1C 5.2 01/31/2024        PSA trend reviewed below:  Lab Results   Component Value Date    PSA 10.1 (H) 01/31/2024    PSA 11.3 (H) 01/26/2023    PSA 7.0 (H) 10/12/2009    PSADIAG 11.9 (H) 07/26/2023      Lab Results   Component Value Date    NITRITE Negative 10/12/2009        Assessment:     1. BPH with urinary obstruction    2. Elevated PSA    3. Retrograde ejaculation    4. Erectile dysfunction due to arterial insufficiency      Plan:     Assessment & Plan    - Enlarged prostate with asymmetry, slightly larger on left side, not necessarily concerning for cancer.    - Informed about orthostatic hypotension as a potential side effect of certain prostate medications.  - Explained that retrograde ejaculation can be a side effect of prostate procedures like UroLift - but less likely  - Discussed low risk of infection with transperineal prostate biopsy approach compared to traditional method.    - Ordered PSA blood test.  - Ordered MRI Prostate to evaluate for any abnormalities requiring biopsy.  - " Ordered Urinalysis.  - Ordered Post-void residual bladder scan.    - Started Rapaflo (silodosin) to be taken daily in the morning for improved urinary stream and prostate symptoms due to history of orthostatic hypotension    - Follow up after completing PSA blood test and prostate MRI.        The visit today included increased complexity associated with the care of the episodic problem addressed above as well as managing the longitudinal care of the patient due to the serious and/or complex managed problem(s).      Portions of this note were generated by [employer] and [employer] Direct dictation services    Kris Archuleta MD

## 2025-06-11 ENCOUNTER — LAB VISIT (OUTPATIENT)
Dept: LAB | Facility: HOSPITAL | Age: 70
End: 2025-06-11
Attending: STUDENT IN AN ORGANIZED HEALTH CARE EDUCATION/TRAINING PROGRAM
Payer: MEDICARE

## 2025-06-11 DIAGNOSIS — N40.1 BPH WITH URINARY OBSTRUCTION: ICD-10-CM

## 2025-06-11 DIAGNOSIS — R97.20 ELEVATED PSA: ICD-10-CM

## 2025-06-11 DIAGNOSIS — N13.8 BPH WITH URINARY OBSTRUCTION: ICD-10-CM

## 2025-06-11 LAB — PSA SERPL-MCNC: 10.43 NG/ML

## 2025-06-11 PROCEDURE — 84153 ASSAY OF PSA TOTAL: CPT

## 2025-06-11 PROCEDURE — 36415 COLL VENOUS BLD VENIPUNCTURE: CPT | Mod: PO

## 2025-07-01 ENCOUNTER — LAB VISIT (OUTPATIENT)
Dept: LAB | Facility: HOSPITAL | Age: 70
End: 2025-07-01
Payer: MEDICARE

## 2025-07-01 ENCOUNTER — OFFICE VISIT (OUTPATIENT)
Dept: PAIN MEDICINE | Facility: CLINIC | Age: 70
End: 2025-07-01
Payer: MEDICARE

## 2025-07-01 VITALS
BODY MASS INDEX: 26.98 KG/M2 | HEART RATE: 70 BPM | WEIGHT: 177.5 LBS | DIASTOLIC BLOOD PRESSURE: 90 MMHG | SYSTOLIC BLOOD PRESSURE: 153 MMHG

## 2025-07-01 DIAGNOSIS — G89.29 CHRONIC BILATERAL LOW BACK PAIN WITHOUT SCIATICA: ICD-10-CM

## 2025-07-01 DIAGNOSIS — M54.50 CHRONIC BILATERAL LOW BACK PAIN WITHOUT SCIATICA: ICD-10-CM

## 2025-07-01 DIAGNOSIS — M54.2 CERVICALGIA: ICD-10-CM

## 2025-07-01 DIAGNOSIS — R97.20 ELEVATED PSA: ICD-10-CM

## 2025-07-01 DIAGNOSIS — R97.20 ELEVATED PSA: Primary | ICD-10-CM

## 2025-07-01 LAB
CREAT SERPL-MCNC: 0.9 MG/DL (ref 0.5–1.4)
GFR SERPLBLD CREATININE-BSD FMLA CKD-EPI: >60 ML/MIN/1.73/M2

## 2025-07-01 PROCEDURE — 3080F DIAST BP >= 90 MM HG: CPT | Mod: CPTII,S$GLB,, | Performed by: ANESTHESIOLOGY

## 2025-07-01 PROCEDURE — 1125F AMNT PAIN NOTED PAIN PRSNT: CPT | Mod: CPTII,S$GLB,, | Performed by: ANESTHESIOLOGY

## 2025-07-01 PROCEDURE — 99204 OFFICE O/P NEW MOD 45 MIN: CPT | Mod: S$GLB,,, | Performed by: ANESTHESIOLOGY

## 2025-07-01 PROCEDURE — 36415 COLL VENOUS BLD VENIPUNCTURE: CPT | Mod: PO

## 2025-07-01 PROCEDURE — 3288F FALL RISK ASSESSMENT DOCD: CPT | Mod: CPTII,S$GLB,, | Performed by: ANESTHESIOLOGY

## 2025-07-01 PROCEDURE — 99999 PR PBB SHADOW E&M-EST. PATIENT-LVL III: CPT | Mod: PBBFAC,,, | Performed by: ANESTHESIOLOGY

## 2025-07-01 PROCEDURE — 3008F BODY MASS INDEX DOCD: CPT | Mod: CPTII,S$GLB,, | Performed by: ANESTHESIOLOGY

## 2025-07-01 PROCEDURE — 1159F MED LIST DOCD IN RCRD: CPT | Mod: CPTII,S$GLB,, | Performed by: ANESTHESIOLOGY

## 2025-07-01 PROCEDURE — 3077F SYST BP >= 140 MM HG: CPT | Mod: CPTII,S$GLB,, | Performed by: ANESTHESIOLOGY

## 2025-07-01 PROCEDURE — 82565 ASSAY OF CREATININE: CPT | Mod: PO

## 2025-07-01 PROCEDURE — 4010F ACE/ARB THERAPY RXD/TAKEN: CPT | Mod: CPTII,S$GLB,, | Performed by: ANESTHESIOLOGY

## 2025-07-01 PROCEDURE — 1100F PTFALLS ASSESS-DOCD GE2>/YR: CPT | Mod: CPTII,S$GLB,, | Performed by: ANESTHESIOLOGY

## 2025-07-01 PROCEDURE — 1160F RVW MEDS BY RX/DR IN RCRD: CPT | Mod: CPTII,S$GLB,, | Performed by: ANESTHESIOLOGY

## 2025-07-01 NOTE — PROGRESS NOTES
[facility] New Patient Evaluation      Referred by: Dr. Huy Cordova    PCP:     CC:   Chief Complaint   Patient presents with    Low-back Pain          7/1/2025     9:25 AM 12/29/2020    11:33 AM 10/7/2020     7:54 AM   Last 3 PDI Scores   Pain Disability Index (PDI) [phone]         HPI:   Edy Bartlett III is a 69 y.o. male patient who has a past medical history of BPH (benign prostatic hypertrophy) with urinary obstruction, Carotid stenosis, Cerebellar stroke, Cerebrovascular accident (CVA) due to occlusion of left cerebellar artery, Complication of anesthesia, DDD (degenerative disc disease), DDD (degenerative disc disease), cervical, DDD (degenerative disc disease), lumbar, DJD (degenerative joint disease) of knee, Dysphagia, ED (erectile dysfunction), Elevated PSA, Hyperlipidemia, Hypertension, Mass of skin, Mobility impaired, Neuropathy, OA (osteoarthritis of spine), Throat cancer, Unspecified sensorineural hearing loss, and Upper airway cough syndrome. He presents for evaluation of lower back pain that has been present for approximately three months. He reports attempting to schedule an appointment for about three months due to severe lower back pain. His current pain level is low. Pain has been shooting down his legs but has improved recently, possibly due to warmer weather. He denies recalling any specific incident that may have caused his back pain.    He demonstrates improved mobility. He has a history of receiving epidural injections for severe back pain. These injections typically provide relief within a week.    An MRI from October 2020 showed degenerative changes in his back. He has a history of cervical spinal fusion.  He also mentions having bilateral knee replacements. He reports a history of cancer with subsequent radiation treatment, which has affected his hearing. He is currently cancer-free but has ongoing effects from radiation treatment.    PREVIOUS TREATMENTS:  Patient received  back epidural injections from Dr. Shy Villela when his pain was severe. Three needles were placed on each side of the spine and in the bottom disk. This treatment provided significant relief. He reported being sore the next day but was up and moving around, and felt good by the end of the week.    MEDICATIONS:  Patient is on low dose Aspirin and Plavix. He takes Ibuprofen 800 mg as needed for severe pain. Hydrocodone is taken as needed for severe pain, especially when he is unable to sleep.    SURGICAL HISTORY:  Patient has a history of bilateral total knee arthroplasty performed on New Year's Brigitte. He underwent a partial knee replacement about 5 years ago. Patient also had a spinal fusion of C3-C7 and ankle surgery.    Pain Intervention History:      Past Spine Surgical History:      Past and current medications:  Antineuropathics:  NSAIDs:  Physical therapy:  Antidepressants:  Muscle relaxers:  Opioids: hydrocodone   Antiplatelets/Anticoagulants: aspirin, plavix     History:  Current Medications[1]    Past Medical History:   Diagnosis Date    BPH (benign prostatic hypertrophy) with urinary obstruction     Carotid stenosis 11/14/2023    Cerebellar stroke     Cerebrovascular accident (CVA) due to occlusion of left cerebellar artery 05/08/2020    Complication of anesthesia     gets very sore neck post-op if his head is twisted or bent too far with intubation    DDD (degenerative disc disease)     DDD (degenerative disc disease), cervical     DDD (degenerative disc disease), lumbar     DJD (degenerative joint disease) of knee 12/10/2012    Dysphagia 02/08/2024    ED (erectile dysfunction)     Elevated PSA     Hyperlipidemia     Hypertension     Mass of skin 2012    left temple, possible lipoma    Mobility impaired     cane    Neuropathy     feet    OA (osteoarthritis of spine)     Throat cancer     squamous cell - per Dr. Austin    Unspecified sensorineural hearing loss     Left Ear    Upper airway cough syndrome  03/27/2020       Past Surgical History:   Procedure Laterality Date    APPENDECTOMY      CAROTID ENDARTERECTOMY Left 11/14/2023    Procedure: ENDARTERECTOMY-CAROTID;  Surgeon: Jules Brown MD;  Location: [facility] OR;  Service: Peripheral Vascular;  Laterality: Left;    CARPAL TUNNEL RELEASE      both hands/ lt hand x 2/ rt 1    CERVICAL SPINE SURGERY      x 2;fusion C3-7, can only bend his neck a little    EPIDURAL BLOCK INJECTION      injection for pain    ESOPHAGOGASTRODUODENOSCOPY N/A 2/8/2024    Procedure: ESOPHAGOGASTRODUODENOSCOPY (EGD);  Surgeon: Alfonso Cummings Jr., MD;  Location: [facility];  Service: Endoscopy;  Laterality: N/A;    FRACTURE SURGERY      ankle    hematoma removed  2006    thigh    HERNIA REPAIR      R inguinal    KNEE ARTHROSCOPY      PROSTATE BIOPSY      TENDON REPAIR      TRIGGER FINGER RELEASE  2007    left    TUMOR EXCISION      fatty tumor to left front of head, has since grown back    UNICOMPARTMENTAL ARTHROPLASTY OF KNEE Left 9/10/2019    Procedure: ARTHROPLASTY, KNEE, UNICOMPARTMENTAL;  Surgeon: Nelson Silva MD;  Location: [facility] OR;  Service: Orthopedics;  Laterality: Left;    UNICOMPARTMENTAL ARTHROPLASTY OF KNEE Right 12/31/2024    Procedure: ARTHROPLASTY, KNEE, UNICOMPARTMENTAL;  Surgeon: Nelson Silva MD;  Location: [facility];  Service: Orthopedics;  Laterality: Right;    urolift         Family History   Problem Relation Name Age of Onset    Hyperlipidemia Mother      Heart disease Mother      Prostate cancer Brother      Diabetes Son      Diabetes type II Maternal Aunt         Social History     Socioeconomic History    Marital status: Legally    Occupational History     Employer:  [employer]   Tobacco Use    Smoking status: Never    Smokeless tobacco: Never   Substance and Sexual Activity    Alcohol use: No    Drug use: No    Sexual activity: Not Currently     Social Drivers of Health     Financial Resource Strain: Unknown (4/28/2021)    Received from [facility]    Overall Financial Resource Strain (CARDIA)     Difficulty of Paying Living Expenses: Patient declined   Food Insecurity: No Food Insecurity (11/14/2023)    Hunger Vital Sign     Worried About Running Out of Food in the Last Year: Never true     Ran Out of Food in the Last Year: Never true   Transportation Needs: No Transportation Needs (11/14/2023)    PRAPARE - Transportation     Lack of Transportation (Medical): No     Lack of Transportation (Non-Medical): No   Physical Activity: Unknown (4/28/2021)    Received from [facility]    Exercise Vital Sign     Days of Exercise per Week: Patient declined     Minutes of Exercise per Session: Patient declined   Stress: No Stress Concern Present (4/28/2021)    Received from [facility]    [employer] - Occupational Stress Questionnaire     Feeling of Stress : Not at all   Housing Stability: Low Risk  (11/14/2023)    Housing Stability Vital Sign     Unable to Pay for Housing in the Last Year: No     Number of Places Lived in the Last Year: 1     Unstable Housing in the Last Year: No       Review of patient's allergies indicates:   Allergen Reactions    Medrol [methylprednisolone]      Headache          Review of Systems:  12 point review of systems is negative.    Physical Exam:  Vitals:    07/01/25 0926   BP: (!) 153/90   Pulse: 70   Weight: 80.5 kg (177 lb 7.5 oz)   PainSc:   2   PainLoc: Back     Body mass index is 26.98 kg/m².    Gen: NAD  Psych: mood appropriate for given condition  HEENT: eyes anicteric   CV: RRR  HEENT: anicteric   Respiratory: non-labored, no signs of respiratory distress  Abd: non-distended  Skin: warm, dry and intact.  Gait: No antalgic gait.     Sensory:  Intact and symmetrical to light touch in L1-S1 dermatomes bilaterally.    Motor:     Right Left   L2/3 Iliacus Hip flexion  5  5   L3/4 Qudratus Femoris Knee Extension  5  5   L4/5 Tib Anterior Ankle Dorsiflexion   5  5    L5/S1 Extensor Hallicus Longus Great toe extension  5  5   S1/S2 Gastroc/Soleus Plantar Flexion  5  5       Labs:  Lab Results   Component Value Date    HGBA1C 5.2 01/31/2024       Lab Results   Component Value Date    WBC 5.21 12/16/2024    HGB 16.6 12/16/2024    HCT 49.5 12/16/2024    MCV 94 12/16/2024     12/16/2024         Imaging:  MRI cervical spine 10/19/2020  FINDINGS:  There has been anterior cervical discectomy and fusion at C6-7.  Interbody fusion is present from C3 through C7, with osseous consolidation present across each level.  There is trace anterolisthesis of C2 on C3.  There is moderate loss of disc height at C7-T1.  Moderate multilevel facet arthropathy is present, worst on the right at C2-3.  The cervical cord mild chronic myelomalacia at C4 and C5.     C2-3: There is small broad-based posterior disc osteophyte complex formation and bilateral facet arthropathy contributing to mild spinal canal narrowing along with moderate right neuroforaminal narrowing.  C3-4: Mild spinal canal narrowing is present along with mild left and moderate right neuroforaminal narrowing.  C4-5: There is mild spinal canal narrowing along with mild bilateral neuroforaminal narrowing.  C5-6: There is a small left paracentral osteophyte.  Overall mild spinal canal narrowing and moderate right neuroforaminal narrowing.  C6-7: There is moderate spinal canal narrowing and moderate bilateral neuroforaminal narrowing.  C7-T1: There is moderate broad-based posterior disc osteophyte complex formation and bilateral facet arthropathy contributing to moderate spinal canal narrowing and severe bilateral neuroforaminal narrowing.    MRI lumbar spine 10/19/2020  FINDINGS:  Vertebral body height and alignment are anatomic.  Marrow signal is appropriate.  The conus terminates at L1-2.  There is moderate multilevel Schmorl's node formation.  Moderate loss of disc height is present at all levels.  Predominately mild multilevel  facet arthropathy is present.     L1-2: There is mild generalized disc bulging contributing to mild spinal canal narrowing and mild bilateral neuroforaminal narrowing.  L2-3: There is mild generalized disc bulging and bilateral facet arthropathy resulting in mild spinal canal narrowing and mild bilateral neuroforaminal narrowing.  L3-4: Mild generalized disc bulging and facet arthropathy result in mild spinal canal narrowing and mild bilateral neuroforaminal narrowing.  L4-5: There is moderate generalized disc bulging and bilateral facet arthropathy resulting in moderate spinal canal narrowing and moderate bilateral neuroforaminal narrowing.  L5-S1: There is mild broad-based posterior disc bulging along the bilateral facet arthropathy.  These result in moderate bilateral neuroforaminal narrowing without significant spinal canal narrowing.    Diagnosis:  1. Cervicalgia  -     Ambulatory referral/consult to Pain Clinic    2. Chronic bilateral low back pain without sciatica  -     Ambulatory referral/consult to Pain Clinic        Edy Bartlett III is a 69 y.o. male patient who has a past medical history of BPH (benign prostatic hypertrophy) with urinary obstruction, Carotid stenosis, Cerebellar stroke, Cerebrovascular accident (CVA) due to occlusion of left cerebellar artery, Complication of anesthesia, DDD (degenerative disc disease), DDD (degenerative disc disease), cervical, DDD (degenerative disc disease), lumbar, DJD (degenerative joint disease) of knee, Dysphagia, ED (erectile dysfunction), Elevated PSA, Hyperlipidemia, Hypertension, Mass of skin, Mobility impaired, Neuropathy, OA (osteoarthritis of spine), Throat cancer, Unspecified sensorineural hearing loss, and Upper airway cough syndrome. He presents for evaluation of lower back pain that has been present for approximately three months. He reports attempting to schedule an appointment for about three months due to severe lower back pain. His current pain  level is low. Pain has been shooting down his legs but has improved recently, possibly due to warmer weather. He denies recalling any specific incident that may have caused his back pain.    - on exam he has full strength in his lower extremities intact sensation to light touch bilateral L2-S1  - the patient's primary pain complaint is lower back pain however reports when he was trying to make the appointment a few months ago his back pain was severe however recently his back pain has resolved on its own.  Only currently he reports minimal back pain  - I have provided him with physician directed home exercise program that he can start on his own to hopefully maintain his current status of overall feeling well with his back pain  - I independently reviewed his previous lumbar MRI from 2020 and he has multilevel bilateral facet arthropathy.  He has moderate this nice loss at all levels.  At L5-S1 there is a broad-based central disc bulge.  At L4-5 he has moderate central canal and moderate bilateral foraminal narrowing  - at this time he says he is doing well.  I discussed if he has an exacerbation in his back pain or radicular symptoms to get in contact with me and we will update imaging and we can consider interventional treatments on an as needed basis.  He will follow up with me as needed      : Not applicable    Josiah Caruso M.D.  Interventional Pain Medicine / Anesthesiology    This note was completed with dictation software and grammatical errors may exist.         [1]   Current Outpatient Medications:     acetaminophen (TYLENOL) 160 MG Chew, Take 160 mg by mouth every 4 (four) hours as needed., Disp: , Rfl:     alirocumab (PRALUENT PEN) 75 mg/mL PnIj, Inject 1 mL (75 mg total) into the skin every 14 (fourteen) days., Disp: 2 mL, Rfl: 12    aspirin (ECOTRIN) 81 MG EC tablet, Take 1 tablet (81 mg total) by mouth once daily., Disp: 90 tablet, Rfl: 3    bisacodyL (DULCOLAX) 5 mg EC tablet, Take 10 mg by mouth  daily as needed., Disp: , Rfl:     clopidogreL (PLAVIX) 75 mg tablet, Take 1 tablet (75 mg total) by mouth once daily., Disp: 90 tablet, Rfl: 1    HYDROcodone-acetaminophen (NORCO) 5-325 mg per tablet, Take 1 tablet by mouth every 24 hours as needed for Pain., Disp: 30 tablet, Rfl: 0    ipratropium (ATROVENT) 42 mcg (0.06 %) nasal spray, 2 sprays by Each Nostril route 4 (four) times daily., Disp: 15 mL, Rfl: 11    losartan (COZAAR) 25 MG tablet, Take 0.5 tablets (12.5 mg total) by mouth once daily., Disp: 45 tablet, Rfl: 3    pantoprazole (PROTONIX) 40 MG tablet, TAKE 1 TABLET BY MOUTH BEFORE BREAKFAST, Disp: 90 tablet, Rfl: 3    SENEXON-S 8.6-50 mg per tablet, Take 1 tablet by mouth 2 (two) times daily., Disp: , Rfl:     sildenafiL (VIAGRA) 100 MG tablet, Take 1 tablet (100 mg total) by mouth daily as needed for Erectile Dysfunction., Disp: 10 tablet, Rfl: 3    silodosin (RAPAFLO) 4 mg Cap capsule, Take 1 capsule (4 mg total) by mouth once daily., Disp: 30 capsule, Rfl: 11    tretinoin (RETIN-A) 0.025 % cream, Apply topically every evening. Start with 2-3 nights per week, then work up to nightly as tolerated., Disp: 20 g, Rfl: 3

## 2025-07-02 ENCOUNTER — HOSPITAL ENCOUNTER (OUTPATIENT)
Dept: RADIOLOGY | Facility: HOSPITAL | Age: 70
Discharge: HOME | End: 2025-07-02
Attending: STUDENT IN AN ORGANIZED HEALTH CARE EDUCATION/TRAINING PROGRAM
Payer: MEDICARE

## 2025-07-02 DIAGNOSIS — R97.20 ELEVATED PSA: ICD-10-CM

## 2025-07-02 PROCEDURE — 25500020 PHARM REV CODE 255: Mod: PO | Performed by: STUDENT IN AN ORGANIZED HEALTH CARE EDUCATION/TRAINING PROGRAM

## 2025-07-02 PROCEDURE — A9585 GADOBUTROL INJECTION: HCPCS | Mod: PO | Performed by: STUDENT IN AN ORGANIZED HEALTH CARE EDUCATION/TRAINING PROGRAM

## 2025-07-02 PROCEDURE — 72197 MRI PELVIS W/O & W/DYE: CPT | Mod: TC,PO

## 2025-07-02 PROCEDURE — 72197 MRI PELVIS W/O & W/DYE: CPT | Mod: 26,,, | Performed by: STUDENT IN AN ORGANIZED HEALTH CARE EDUCATION/TRAINING PROGRAM

## 2025-07-02 RX ORDER — GADOBUTROL 604.72 MG/ML
8 INJECTION INTRAVENOUS
Status: COMPLETED | OUTPATIENT
Start: 2025-07-02 | End: 2025-07-02

## 2025-07-02 RX ADMIN — GADOBUTROL 8 ML: 604.72 INJECTION INTRAVENOUS at 09:07

## 2025-07-03 ENCOUNTER — TELEPHONE (OUTPATIENT)
Dept: UROLOGY | Facility: CLINIC | Age: 70
End: 2025-07-03
Payer: MEDICARE

## 2025-07-29 ENCOUNTER — OFFICE VISIT (OUTPATIENT)
Dept: UROLOGY | Facility: CLINIC | Age: 70
End: 2025-07-29
Payer: MEDICARE

## 2025-07-29 VITALS — WEIGHT: 177.5 LBS | HEIGHT: 68 IN | BODY MASS INDEX: 26.9 KG/M2

## 2025-07-29 DIAGNOSIS — N13.8 BPH WITH URINARY OBSTRUCTION: ICD-10-CM

## 2025-07-29 DIAGNOSIS — N40.1 BPH WITH URINARY OBSTRUCTION: ICD-10-CM

## 2025-07-29 DIAGNOSIS — R97.20 ELEVATED PSA: Primary | ICD-10-CM

## 2025-07-29 PROCEDURE — G2211 COMPLEX E/M VISIT ADD ON: HCPCS | Mod: S$GLB,,, | Performed by: STUDENT IN AN ORGANIZED HEALTH CARE EDUCATION/TRAINING PROGRAM

## 2025-07-29 PROCEDURE — 4010F ACE/ARB THERAPY RXD/TAKEN: CPT | Mod: CPTII,S$GLB,, | Performed by: STUDENT IN AN ORGANIZED HEALTH CARE EDUCATION/TRAINING PROGRAM

## 2025-07-29 PROCEDURE — 1100F PTFALLS ASSESS-DOCD GE2>/YR: CPT | Mod: CPTII,S$GLB,, | Performed by: STUDENT IN AN ORGANIZED HEALTH CARE EDUCATION/TRAINING PROGRAM

## 2025-07-29 PROCEDURE — 3288F FALL RISK ASSESSMENT DOCD: CPT | Mod: CPTII,S$GLB,, | Performed by: STUDENT IN AN ORGANIZED HEALTH CARE EDUCATION/TRAINING PROGRAM

## 2025-07-29 PROCEDURE — 3008F BODY MASS INDEX DOCD: CPT | Mod: CPTII,S$GLB,, | Performed by: STUDENT IN AN ORGANIZED HEALTH CARE EDUCATION/TRAINING PROGRAM

## 2025-07-29 PROCEDURE — 99214 OFFICE O/P EST MOD 30 MIN: CPT | Mod: S$GLB,,, | Performed by: STUDENT IN AN ORGANIZED HEALTH CARE EDUCATION/TRAINING PROGRAM

## 2025-07-29 PROCEDURE — 1159F MED LIST DOCD IN RCRD: CPT | Mod: CPTII,S$GLB,, | Performed by: STUDENT IN AN ORGANIZED HEALTH CARE EDUCATION/TRAINING PROGRAM

## 2025-07-29 PROCEDURE — 99999 PR PBB SHADOW E&M-EST. PATIENT-LVL III: CPT | Mod: PBBFAC,,, | Performed by: STUDENT IN AN ORGANIZED HEALTH CARE EDUCATION/TRAINING PROGRAM

## 2025-07-29 PROCEDURE — 1125F AMNT PAIN NOTED PAIN PRSNT: CPT | Mod: CPTII,S$GLB,, | Performed by: STUDENT IN AN ORGANIZED HEALTH CARE EDUCATION/TRAINING PROGRAM

## 2025-07-29 NOTE — PROGRESS NOTES
"[facility]   Clinic Note    Subjective:     Chief Complaint: Follow-up      History of Present Illness    CHIEF COMPLAINT:  Edy presents today with worsening prostate pain    PROSTATE HISTORY:  He has a history of elevated PSA.  He has had 2 previous prostate biopsies which were benign.  Most recent resulted in post biopsy sepsis.  He is hesitant about proceeding with additional biopsies.  PSA trend reviewed below    07/02/2025 prostate MRI showed a volume of 73 cc with no specific prostate lesion.  PI-RADS 2  He underwent a UroLift procedure with Dr. Kim for management of urinary symptoms.    URINARY SYMPTOMS:  He reports persistent urinary urgency with sudden and intense need to urinate that could potentially lead to incontinence. He has not been taking prescribed Rapaflo/Silodosin as recommended due to medication apprehension, and states he has become accustomed to the urgency symptoms. He denies any symptom improvement due to medication non-compliance. IPSS 22/4            PSA   Latest Ref Rng 0.00 - 4.00 ng/mL   10/12/2009 7.0 (H)    1/26/2023 11.3 (H)    7/26/2023 11.9 (H)    1/31/2024 10.1 (H)      6/11/2025 10.43 (H)         Past medical, family, surgical and social history reviewed as documented in chart with pertinent positive medical, family, surgical and social history detailed in HPI.    A review systems was conducted with pertinent positive and negative findings documented in HPI.    Objective:     Estimated body mass index is 26.98 kg/m² as calculated from the following:    Height as of this encounter: 5' 8" (1.727 m).    Weight as of this encounter: 80.5 kg (177 lb 7.5 oz).    Vital Signs (Most Recent)       General: No acute distress, well developed.   Head: Normocephalic, atraumatic  CV: no cyanosis  Lungs: normal respiratory effort, no respiratory distress    Labs reviewed below:  Lab Results   Component Value Date    BUN 9 12/31/2024    CREATININE 0.9 07/01/2025    WBC 5.21 " 12/16/2024    HGB 16.6 12/16/2024    HCT 49.5 12/16/2024     12/16/2024    AST 17 12/31/2024    ALT 14 12/31/2024    ALKPHOS 66 12/31/2024    ALBUMIN 3.7 12/31/2024    HGBA1C 5.2 01/31/2024        PSA trend reviewed below:  Lab Results   Component Value Date    PSA 10.43 (H) 06/11/2025    PSA 10.1 (H) 01/31/2024    PSA 11.3 (H) 01/26/2023    PSA 7.0 (H) 10/12/2009    PSADIAG 11.9 (H) 07/26/2023        Lab Results   Component Value Date    BLOODUA Negative 06/10/2025    WBCUR Negative 06/10/2025    NITRITE Negative 06/10/2025      Assessment:     1. Elevated PSA    2. BPH with urinary obstruction      Plan:     Assessment & Plan    - Enlarged prostate potential explanation for elevated PSA.  - Noted history of two previous benign prostate biopsies.  - Explained transperineal biopsy as lower-risk alternative to traditional biopsy method if needed in future.  - discussed the option for proceeding with biopsy versus continued monitoring with a PSA  - he would prefer to continue PSA monitoring for now and will potentially consider a biopsy in the future    - Explained Rapaflo (Silodosin) helps open bladder neck, improves urinary stream, aids in more effective bladder emptying, and can help with urinary urgency and frequency. Explained Rapaflo does not have the low BP side effect experienced with previous medication. Discussed potential side effect of Rapaflo on ejaculation.    - Ordered PSA blood test in 6 months.    - Started Rapaflo (Silodosin) for urinary symptoms.     - Follow up in 6 months for PSA blood test and reassessment.        The visit today included increased complexity associated with the care of the episodic problem addressed above as well as managing the longitudinal care of the patient due to the serious and/or complex managed problem(s).      Portions of this note were generated by [employer] Direct dictation services    Kris Archuleta MD

## 2025-07-31 ENCOUNTER — OFFICE VISIT (OUTPATIENT)
Dept: FAMILY MEDICINE | Facility: CLINIC | Age: 70
End: 2025-07-31
Payer: MEDICARE

## 2025-07-31 ENCOUNTER — LAB VISIT (OUTPATIENT)
Dept: LAB | Facility: HOSPITAL | Age: 70
End: 2025-07-31
Attending: INTERNAL MEDICINE
Payer: MEDICARE

## 2025-07-31 VITALS
SYSTOLIC BLOOD PRESSURE: 136 MMHG | WEIGHT: 176.38 LBS | HEART RATE: 63 BPM | HEIGHT: 68 IN | BODY MASS INDEX: 26.73 KG/M2 | DIASTOLIC BLOOD PRESSURE: 88 MMHG | OXYGEN SATURATION: 95 %

## 2025-07-31 DIAGNOSIS — Z86.73 HISTORY OF CVA (CEREBROVASCULAR ACCIDENT): ICD-10-CM

## 2025-07-31 DIAGNOSIS — N40.1 BPH WITH URINARY OBSTRUCTION: ICD-10-CM

## 2025-07-31 DIAGNOSIS — M54.2 CERVICALGIA: Primary | ICD-10-CM

## 2025-07-31 DIAGNOSIS — I10 ESSENTIAL HYPERTENSION: ICD-10-CM

## 2025-07-31 DIAGNOSIS — Z12.11 COLON CANCER SCREENING: ICD-10-CM

## 2025-07-31 DIAGNOSIS — R73.03 PREDIABETES: ICD-10-CM

## 2025-07-31 DIAGNOSIS — N13.8 BPH WITH URINARY OBSTRUCTION: ICD-10-CM

## 2025-07-31 LAB
ABSOLUTE EOSINOPHIL (OHS): 0.03 K/UL
ABSOLUTE MONOCYTE (OHS): 0.57 K/UL (ref 0.3–1)
ABSOLUTE NEUTROPHIL COUNT (OHS): 2.86 K/UL (ref 1.8–7.7)
ALBUMIN SERPL BCP-MCNC: 3.9 G/DL (ref 3.5–5.2)
ALP SERPL-CCNC: 83 UNIT/L (ref 40–150)
ALT SERPL W/O P-5'-P-CCNC: 28 UNIT/L (ref 0–55)
ANION GAP (OHS): 6 MMOL/L (ref 8–16)
AST SERPL-CCNC: 29 UNIT/L (ref 0–50)
BASOPHILS # BLD AUTO: 0.03 K/UL
BASOPHILS NFR BLD AUTO: 0.6 %
BILIRUB SERPL-MCNC: 0.5 MG/DL (ref 0.1–1)
BUN SERPL-MCNC: 9 MG/DL (ref 8–23)
CALCIUM SERPL-MCNC: 9.1 MG/DL (ref 8.7–10.5)
CHLORIDE SERPL-SCNC: 103 MMOL/L (ref 95–110)
CHOLEST SERPL-MCNC: 167 MG/DL (ref 120–199)
CHOLEST/HDLC SERPL: 4.9 {RATIO} (ref 2–5)
CO2 SERPL-SCNC: 29 MMOL/L (ref 23–29)
CREAT SERPL-MCNC: 1.1 MG/DL (ref 0.5–1.4)
EAG (OHS): 114 MG/DL (ref 68–131)
ERYTHROCYTE [DISTWIDTH] IN BLOOD BY AUTOMATED COUNT: 13.7 % (ref 11.5–14.5)
GFR SERPLBLD CREATININE-BSD FMLA CKD-EPI: >60 ML/MIN/1.73/M2
GLUCOSE SERPL-MCNC: 83 MG/DL (ref 70–110)
HBA1C MFR BLD: 5.6 % (ref 4–5.6)
HCT VFR BLD AUTO: 47.6 % (ref 40–54)
HDLC SERPL-MCNC: 34 MG/DL (ref 40–75)
HDLC SERPL: 20.4 % (ref 20–50)
HGB BLD-MCNC: 15.4 GM/DL (ref 14–18)
IMM GRANULOCYTES # BLD AUTO: 0.02 K/UL (ref 0–0.04)
IMM GRANULOCYTES NFR BLD AUTO: 0.4 % (ref 0–0.5)
LDLC SERPL CALC-MCNC: 107.6 MG/DL (ref 63–159)
LYMPHOCYTES # BLD AUTO: 1.38 K/UL (ref 1–4.8)
MCH RBC QN AUTO: 30.6 PG (ref 27–31)
MCHC RBC AUTO-ENTMCNC: 32.4 G/DL (ref 32–36)
MCV RBC AUTO: 94 FL (ref 82–98)
NONHDLC SERPL-MCNC: 133 MG/DL
NUCLEATED RBC (/100WBC) (OHS): 0 /100 WBC
PLATELET # BLD AUTO: 191 K/UL (ref 150–450)
PMV BLD AUTO: 9.3 FL (ref 9.2–12.9)
POTASSIUM SERPL-SCNC: 4.3 MMOL/L (ref 3.5–5.1)
PROT SERPL-MCNC: 6.9 GM/DL (ref 6–8.4)
RBC # BLD AUTO: 5.04 M/UL (ref 4.6–6.2)
RELATIVE EOSINOPHIL (OHS): 0.6 %
RELATIVE LYMPHOCYTE (OHS): 28.2 % (ref 18–48)
RELATIVE MONOCYTE (OHS): 11.7 % (ref 4–15)
RELATIVE NEUTROPHIL (OHS): 58.5 % (ref 38–73)
SODIUM SERPL-SCNC: 138 MMOL/L (ref 136–145)
TRIGL SERPL-MCNC: 127 MG/DL (ref 30–150)
WBC # BLD AUTO: 4.89 K/UL (ref 3.9–12.7)

## 2025-07-31 PROCEDURE — 3079F DIAST BP 80-89 MM HG: CPT | Mod: CPTII,S$GLB,, | Performed by: INTERNAL MEDICINE

## 2025-07-31 PROCEDURE — 3075F SYST BP GE 130 - 139MM HG: CPT | Mod: CPTII,S$GLB,, | Performed by: INTERNAL MEDICINE

## 2025-07-31 PROCEDURE — 1159F MED LIST DOCD IN RCRD: CPT | Mod: CPTII,S$GLB,, | Performed by: INTERNAL MEDICINE

## 2025-07-31 PROCEDURE — 1100F PTFALLS ASSESS-DOCD GE2>/YR: CPT | Mod: CPTII,S$GLB,, | Performed by: INTERNAL MEDICINE

## 2025-07-31 PROCEDURE — 80053 COMPREHEN METABOLIC PANEL: CPT

## 2025-07-31 PROCEDURE — 3008F BODY MASS INDEX DOCD: CPT | Mod: CPTII,S$GLB,, | Performed by: INTERNAL MEDICINE

## 2025-07-31 PROCEDURE — 80061 LIPID PANEL: CPT

## 2025-07-31 PROCEDURE — 83036 HEMOGLOBIN GLYCOSYLATED A1C: CPT

## 2025-07-31 PROCEDURE — 99214 OFFICE O/P EST MOD 30 MIN: CPT | Mod: S$GLB,,, | Performed by: INTERNAL MEDICINE

## 2025-07-31 PROCEDURE — 1160F RVW MEDS BY RX/DR IN RCRD: CPT | Mod: CPTII,S$GLB,, | Performed by: INTERNAL MEDICINE

## 2025-07-31 PROCEDURE — 4010F ACE/ARB THERAPY RXD/TAKEN: CPT | Mod: CPTII,S$GLB,, | Performed by: INTERNAL MEDICINE

## 2025-07-31 PROCEDURE — 99999 PR PBB SHADOW E&M-EST. PATIENT-LVL III: CPT | Mod: PBBFAC,,, | Performed by: INTERNAL MEDICINE

## 2025-07-31 PROCEDURE — 3288F FALL RISK ASSESSMENT DOCD: CPT | Mod: CPTII,S$GLB,, | Performed by: INTERNAL MEDICINE

## 2025-07-31 PROCEDURE — 36415 COLL VENOUS BLD VENIPUNCTURE: CPT | Mod: PO

## 2025-07-31 PROCEDURE — 85025 COMPLETE CBC W/AUTO DIFF WBC: CPT

## 2025-07-31 RX ORDER — HYDROCODONE BITARTRATE AND ACETAMINOPHEN 5; 325 MG/1; MG/1
1 TABLET ORAL
Qty: 30 TABLET | Refills: 0 | Status: SHIPPED | OUTPATIENT
Start: 2025-07-31

## 2025-07-31 NOTE — PROGRESS NOTES
"[facility]  Primary Care   [address] [facility] [address]       Patient ID: Edy Bartlett III     Chief Complaint:   Chief Complaint   Patient presents with    Wellness     2 month follow up        HPI: Follow up for chronic arthritis pain. He requests a refill on Hydrocodone 5 mg that he can take daily as needed for pain . He also requests a steroid shot in his right shoulder due to extreme pain.  I sent a message on his behalf to Orthopedics to see if they can get him in sometime soon to get that done. Labs today Cologuard for Colon Cancer Screening.     Review of Systems       Arthralgias    Objective:      Physical Exam   Physical Exam       Ambulates with cane     Vitals:   Vitals:    07/31/25 1627   BP: 136/88   Pulse: 63   SpO2: 95%   Weight: 80 kg (176 lb 5.9 oz)   Height: 5' 8" (1.727 m)        Assessment:           Plan:       Edy Bartlett III  was seen today for follow-up and may need lab work.    Diagnoses and all orders for this visit:    Edy was seen today for wellness.    Diagnoses and all orders for this visit:    Cervicalgia  -     HYDROcodone-acetaminophen (NORCO) 5-325 mg per tablet; Take 1 tablet by mouth every 24 hours as needed for Pain.  Somewhat Controlled with Hydrocodone     Colon cancer screening  -     Cologuard Screening (Multitarget Stool DNA); Future  -     Cologuard Screening (Multitarget Stool DNA)    BPH with urinary obstruction  Start Rapaflo and Follow up with Urology     Essential hypertension  Controlled with Losartan            Huy Cordova MD    "

## 2025-08-01 ENCOUNTER — TELEPHONE (OUTPATIENT)
Dept: ORTHOPEDICS | Facility: CLINIC | Age: 70
End: 2025-08-01
Payer: MEDICARE

## 2025-08-03 PROBLEM — T46.6X5A STATIN MYOPATHY: Status: ACTIVE | Noted: 2025-08-03

## 2025-08-03 PROBLEM — G72.0 STATIN MYOPATHY: Status: ACTIVE | Noted: 2025-08-03

## 2025-08-04 DIAGNOSIS — M25.512 ACUTE PAIN OF BOTH SHOULDERS: Primary | ICD-10-CM

## 2025-08-04 DIAGNOSIS — M25.511 ACUTE PAIN OF BOTH SHOULDERS: Primary | ICD-10-CM

## 2025-08-06 ENCOUNTER — OFFICE VISIT (OUTPATIENT)
Dept: ORTHOPEDICS | Facility: CLINIC | Age: 70
End: 2025-08-06
Payer: MEDICARE

## 2025-08-06 ENCOUNTER — HOSPITAL ENCOUNTER (OUTPATIENT)
Dept: RADIOLOGY | Facility: HOSPITAL | Age: 70
Discharge: HOME | End: 2025-08-06
Attending: ORTHOPAEDIC SURGERY
Payer: MEDICARE

## 2025-08-06 VITALS
BODY MASS INDEX: 26.73 KG/M2 | SYSTOLIC BLOOD PRESSURE: 136 MMHG | HEIGHT: 68 IN | WEIGHT: 176.38 LBS | DIASTOLIC BLOOD PRESSURE: 88 MMHG | HEART RATE: 63 BPM

## 2025-08-06 DIAGNOSIS — M25.511 CHRONIC RIGHT SHOULDER PAIN: Primary | ICD-10-CM

## 2025-08-06 DIAGNOSIS — M25.512 ACUTE PAIN OF BOTH SHOULDERS: ICD-10-CM

## 2025-08-06 DIAGNOSIS — M25.511 RIGHT SHOULDER PAIN, UNSPECIFIED CHRONICITY: ICD-10-CM

## 2025-08-06 DIAGNOSIS — G89.29 CHRONIC RIGHT SHOULDER PAIN: Primary | ICD-10-CM

## 2025-08-06 DIAGNOSIS — M25.511 ACUTE PAIN OF BOTH SHOULDERS: ICD-10-CM

## 2025-08-06 PROCEDURE — 3288F FALL RISK ASSESSMENT DOCD: CPT | Mod: CPTII,S$GLB,, | Performed by: ORTHOPAEDIC SURGERY

## 2025-08-06 PROCEDURE — 20610 DRAIN/INJ JOINT/BURSA W/O US: CPT | Mod: RT,S$GLB,, | Performed by: ORTHOPAEDIC SURGERY

## 2025-08-06 PROCEDURE — 3075F SYST BP GE 130 - 139MM HG: CPT | Mod: CPTII,S$GLB,, | Performed by: ORTHOPAEDIC SURGERY

## 2025-08-06 PROCEDURE — 99999 PR PBB SHADOW E&M-EST. PATIENT-LVL III: CPT | Mod: PBBFAC,,, | Performed by: ORTHOPAEDIC SURGERY

## 2025-08-06 PROCEDURE — 73030 X-RAY EXAM OF SHOULDER: CPT | Mod: 26,RT,, | Performed by: RADIOLOGY

## 2025-08-06 PROCEDURE — 99214 OFFICE O/P EST MOD 30 MIN: CPT | Mod: 25,S$GLB,, | Performed by: ORTHOPAEDIC SURGERY

## 2025-08-06 PROCEDURE — 3079F DIAST BP 80-89 MM HG: CPT | Mod: CPTII,S$GLB,, | Performed by: ORTHOPAEDIC SURGERY

## 2025-08-06 PROCEDURE — 4010F ACE/ARB THERAPY RXD/TAKEN: CPT | Mod: CPTII,S$GLB,, | Performed by: ORTHOPAEDIC SURGERY

## 2025-08-06 PROCEDURE — 1125F AMNT PAIN NOTED PAIN PRSNT: CPT | Mod: CPTII,S$GLB,, | Performed by: ORTHOPAEDIC SURGERY

## 2025-08-06 PROCEDURE — 1101F PT FALLS ASSESS-DOCD LE1/YR: CPT | Mod: CPTII,S$GLB,, | Performed by: ORTHOPAEDIC SURGERY

## 2025-08-06 PROCEDURE — 3044F HG A1C LEVEL LT 7.0%: CPT | Mod: CPTII,S$GLB,, | Performed by: ORTHOPAEDIC SURGERY

## 2025-08-06 PROCEDURE — 3008F BODY MASS INDEX DOCD: CPT | Mod: CPTII,S$GLB,, | Performed by: ORTHOPAEDIC SURGERY

## 2025-08-06 PROCEDURE — 1159F MED LIST DOCD IN RCRD: CPT | Mod: CPTII,S$GLB,, | Performed by: ORTHOPAEDIC SURGERY

## 2025-08-06 PROCEDURE — 73030 X-RAY EXAM OF SHOULDER: CPT | Mod: TC,PO,RT

## 2025-08-06 RX ORDER — TRIAMCINOLONE ACETONIDE 40 MG/ML
80 INJECTION, SUSPENSION INTRA-ARTICULAR; INTRAMUSCULAR
Status: DISCONTINUED | OUTPATIENT
Start: 2025-08-06 | End: 2025-08-06 | Stop reason: HOSPADM

## 2025-08-06 RX ADMIN — TRIAMCINOLONE ACETONIDE 80 MG: 40 INJECTION, SUSPENSION INTRA-ARTICULAR; INTRAMUSCULAR at 08:08

## 2025-08-06 NOTE — PROCEDURES
Large Joint Aspiration/Injection: R subacromial bursa    Date/Time: 8/6/2025 8:00 AM    Performed by: Nelson Silva MD  Authorized by: Nelson Silva MD    Site marked: the procedure site was marked    Prep: patient was prepped and draped in usual sterile fashion      Details:  Needle Size:  21 G  Approach:  Posterior  Location:  Shoulder  Site:  R subacromial bursa  Medications:  80 mg triamcinolone acetonide 40 mg/mL  Patient tolerance:  Patient tolerated the procedure well with no immediate complications

## 2025-08-06 NOTE — PROGRESS NOTES
69 years old recurrent right shoulder pain.  Pain is 4 on the pain scale.  Worse with activity relieved with rest.  Patient had injections in the past with favorable results requesting that done today     Exam shows weak and painful cuff strength no signs infection tenderness the AC joint     X-rays show AC arthrosis probable chronic rotator cuff tear     Assessment:  Right shoulder arthrosis, degenerative rotator cuff disease     Plan: Kenalog injection right shoulder subacromial space, encourage shoulder strengthening type exercises, follow up as needed

## 2025-08-15 DIAGNOSIS — Z86.73 HISTORY OF CVA (CEREBROVASCULAR ACCIDENT): ICD-10-CM

## 2025-08-18 RX ORDER — CLOPIDOGREL BISULFATE 75 MG/1
75 TABLET ORAL DAILY
Qty: 90 TABLET | Refills: 3 | Status: SHIPPED | OUTPATIENT
Start: 2025-08-18

## 2025-08-26 DIAGNOSIS — Z86.73 HISTORY OF CVA (CEREBROVASCULAR ACCIDENT): ICD-10-CM

## 2025-08-26 RX ORDER — ALIROCUMAB 75 MG/ML
75 INJECTION, SOLUTION SUBCUTANEOUS
Qty: 6 ML | Refills: 3 | Status: ACTIVE | OUTPATIENT
Start: 2025-08-26

## (undated) DEVICE — SEE MEDLINE ITEM 152530

## (undated) DEVICE — DRAPE EXTREMITY W/ABC NON-SLIP

## (undated) DEVICE — XPERIENCE ADVANCED SURGICAL IRRIGATION

## (undated) DEVICE — ELECTRODE REM PLYHSV RETURN 9

## (undated) DEVICE — BLADE SURG CARBON STEEL #10

## (undated) DEVICE — BLADE SURG #15 CARBON STEEL

## (undated) DEVICE — Device

## (undated) DEVICE — GOWN TOGA SYS PEELWY ZIP 2 XL

## (undated) DEVICE — INTERPULSE SET

## (undated) DEVICE — GLOVE SENSICARE PI ALOE 7.5

## (undated) DEVICE — SEE MEDLINE ITEM 152523

## (undated) DEVICE — SUT 2/0 36IN COATED VICRYL

## (undated) DEVICE — SUT 1 36IN COATED VICRYL UN

## (undated) DEVICE — SEE MEDLINE ITEM 152622

## (undated) DEVICE — ADHESIVE MASTISOL VIAL 48/BX

## (undated) DEVICE — SEE MEDLINE ITEM 153151

## (undated) DEVICE — STRAP OR TABLE 5IN X 72IN

## (undated) DEVICE — SEE MEDLINE ITEM 157125

## (undated) DEVICE — BLADE OXFORD SAW 70X10X1.19MM

## (undated) DEVICE — SPONGE COTTON TRAY 4X4IN

## (undated) DEVICE — DRAPE INCISE IOBAN 2 23X17IN

## (undated) DEVICE — STRIP STERI REIN CLSR 1/2X2IN

## (undated) DEVICE — SEE MEDLINE ITEM 146231

## (undated) DEVICE — DRAPE THREE-QTR REINF 53X77IN

## (undated) DEVICE — STRIP MEDI WND CLSR 1/2X4IN

## (undated) DEVICE — SEE MEDLINE ITEM 157128

## (undated) DEVICE — SOL IRR NACL .9% 3000ML

## (undated) DEVICE — PENCIL ROCKER SWITCH 10FT CORD

## (undated) DEVICE — DRESSING XEROFORM 1X8IN

## (undated) DEVICE — GLOVE SENSICARE PI GRN 8.5

## (undated) DEVICE — PAD CAST SPECIALIST STRL 6

## (undated) DEVICE — YANKAUER OPEN TIP W/O VENT

## (undated) DEVICE — KIT SAHARA DRAPE DRAW/LIFT

## (undated) DEVICE — STOCKINET TUBULAR 1 PLY 6X60IN

## (undated) DEVICE — BNDG COFLEX FOAM LF2 ST 6X5YD

## (undated) DEVICE — UNDERGLOVES BIOGEL PI SIZE 7.5

## (undated) DEVICE — SEE MEDLINE ITEM 157131

## (undated) DEVICE — SYS REVOLUTION CEMENT MIXING

## (undated) DEVICE — SUT PROLENE 3-0 PS-1 BL 18

## (undated) DEVICE — TRAY SKIN SCRUB WET PREMIUM

## (undated) DEVICE — KIT TOTAL HIP OPTIVAC

## (undated) DEVICE — SEE MEDLINE ITEM 154981

## (undated) DEVICE — TOURNIQUET SB QC DP 30X4IN

## (undated) DEVICE — TOWEL OR DISP STRL BLUE 4/PK

## (undated) DEVICE — SUT 2-0 VICRYL / CT-1

## (undated) DEVICE — COVER TABLE HVY DTY 60X90IN

## (undated) DEVICE — SEE MEDLINE ITEM 146313

## (undated) DEVICE — SUT PROLENE 3-0 PS-2 BL 18IN

## (undated) DEVICE — SURGX STERILE ANTIMICROBIAL GEL

## (undated) DEVICE — GLOVE SENSICARE PI GRN 9

## (undated) DEVICE — BANDAGE ESMARK ELASTIC ST 6X9

## (undated) DEVICE — UNDERGLOVES BIOGEL PI SIZE 8

## (undated) DEVICE — SEE MEDLINE ITEM 146345

## (undated) DEVICE — BIT DRILL QUICK RELEASE 1/8 SZ

## (undated) DEVICE — GLOVE SENSICARE PI GRN 8

## (undated) DEVICE — PAD COLD THERAPY KNEE WRAP ON

## (undated) DEVICE — GAUZE SPONGE 4X4 12PLY

## (undated) DEVICE — BANDAGE MATRIX HK LOOP 6IN 5YD

## (undated) DEVICE — CLOSURE SKIN STERI STRIP 1/2X4

## (undated) DEVICE — PUMP COLD THERAPY

## (undated) DEVICE — SUT CTD VICRYL CT-1 27

## (undated) DEVICE — BLADE RECIP DOUBLE SIDED

## (undated) DEVICE — SPONGE LAP 18X18 PREWASHED

## (undated) DEVICE — HANDLE SURG LIGHT NONRIGID